# Patient Record
Sex: FEMALE | Race: WHITE | Employment: OTHER | URBAN - METROPOLITAN AREA
[De-identification: names, ages, dates, MRNs, and addresses within clinical notes are randomized per-mention and may not be internally consistent; named-entity substitution may affect disease eponyms.]

---

## 2018-03-16 ENCOUNTER — OFFICE VISIT (OUTPATIENT)
Dept: CARDIOLOGY CLINIC | Age: 73
End: 2018-03-16
Payer: MEDICARE

## 2018-03-16 VITALS
BODY MASS INDEX: 32.9 KG/M2 | HEIGHT: 60 IN | WEIGHT: 167.6 LBS | RESPIRATION RATE: 16 BRPM | DIASTOLIC BLOOD PRESSURE: 80 MMHG | SYSTOLIC BLOOD PRESSURE: 130 MMHG | HEART RATE: 81 BPM

## 2018-03-16 DIAGNOSIS — E78.00 PURE HYPERCHOLESTEROLEMIA: ICD-10-CM

## 2018-03-16 DIAGNOSIS — I25.10 CORONARY ARTERY DISEASE INVOLVING NATIVE CORONARY ARTERY OF NATIVE HEART WITHOUT ANGINA PECTORIS: Primary | Chronic | ICD-10-CM

## 2018-03-16 DIAGNOSIS — Z95.5 STENTED CORONARY ARTERY: ICD-10-CM

## 2018-03-16 PROCEDURE — 93000 ELECTROCARDIOGRAM COMPLETE: CPT | Performed by: INTERNAL MEDICINE

## 2018-03-16 PROCEDURE — G8400 PT W/DXA NO RESULTS DOC: HCPCS | Performed by: INTERNAL MEDICINE

## 2018-03-16 PROCEDURE — 4040F PNEUMOC VAC/ADMIN/RCVD: CPT | Performed by: INTERNAL MEDICINE

## 2018-03-16 PROCEDURE — G8482 FLU IMMUNIZE ORDER/ADMIN: HCPCS | Performed by: INTERNAL MEDICINE

## 2018-03-16 PROCEDURE — 3014F SCREEN MAMMO DOC REV: CPT | Performed by: INTERNAL MEDICINE

## 2018-03-16 PROCEDURE — G8417 CALC BMI ABV UP PARAM F/U: HCPCS | Performed by: INTERNAL MEDICINE

## 2018-03-16 PROCEDURE — G8598 ASA/ANTIPLAT THER USED: HCPCS | Performed by: INTERNAL MEDICINE

## 2018-03-16 PROCEDURE — 1090F PRES/ABSN URINE INCON ASSESS: CPT | Performed by: INTERNAL MEDICINE

## 2018-03-16 PROCEDURE — G8427 DOCREV CUR MEDS BY ELIG CLIN: HCPCS | Performed by: INTERNAL MEDICINE

## 2018-03-16 PROCEDURE — 3017F COLORECTAL CA SCREEN DOC REV: CPT | Performed by: INTERNAL MEDICINE

## 2018-03-16 PROCEDURE — 99213 OFFICE O/P EST LOW 20 MIN: CPT | Performed by: INTERNAL MEDICINE

## 2018-03-16 PROCEDURE — 1036F TOBACCO NON-USER: CPT | Performed by: INTERNAL MEDICINE

## 2018-03-16 PROCEDURE — 1123F ACP DISCUSS/DSCN MKR DOCD: CPT | Performed by: INTERNAL MEDICINE

## 2018-03-16 NOTE — PROGRESS NOTES
Patient Active Problem List   Diagnosis    Hydronephrosis    Dehydration    Gastroenteritis    CAD (coronary artery disease), native coronary artery    Essential hypertension, benign    Syncope, near    Pure hypercholesterolemia       Current Outpatient Prescriptions   Medication Sig Dispense Refill    NEXIUM 40 MG delayed release capsule       metFORMIN (GLUCOPHAGE) 500 MG tablet Take 500 mg by mouth Pt takes 1/2 tablet qd      metoprolol (TOPROL-XL) 25 MG XL tablet Take 12.5 mg by mouth 2 times daily. Indications: 12.5 mg bid      nitroGLYCERIN (NITROSTAT) 0.4 MG SL tablet Place 1 tablet under the tongue every 5 minutes as needed for Chest pain. 25 tablet 6    olmesartan (BENICAR) 20 MG tablet Take 10 mg by mouth 2 times daily. 10 mg twice a day      aspirin 81 MG chewable tablet Take 81 mg by mouth daily.  levothyroxine (SYNTHROID) 88 MCG tablet Take 88 mcg by mouth daily. Instructed to take with sip water am of procedure      rosuvastatin (CRESTOR) 10 MG tablet Take 10 mg by mouth daily        No current facility-administered medications for this visit. CC:    Patient is seen in follow up for:  1. Coronary artery disease involving native coronary artery of native heart without angina pectoris    2. Pure hypercholesterolemia    3. Stented coronary artery - Cx        HPI:  Patient is doing well without any specific cardiac problems. Patient denies any shortness of breath, chest pain, lightheadedness or dizziness. Patient is tolerating medications well without side effects. Notes lipids recently checked.     ROS:   General: No unusual weight gain, no change in exercise tolerance  Skin: No rash or itching  EENT: No vision changes or nosebleeds  Cardiovascular: No orthopnea or paroxysmal nocturnal dyspnea  Respiratory: No cough or hemoptysis  Gastrointestinal: No hematemesis or recent changes in bowel habits  Genitourinary: No hematuria, urgency or frequency  Musculoskeletal: No muscular

## 2018-11-08 ENCOUNTER — HOSPITAL ENCOUNTER (OUTPATIENT)
Dept: ULTRASOUND IMAGING | Age: 73
Discharge: HOME | End: 2018-11-10
Payer: MEDICARE

## 2018-11-08 DIAGNOSIS — R10.9 ABDOMINAL PAIN, UNSPECIFIED ABDOMINAL LOCATION: ICD-10-CM

## 2018-11-08 DIAGNOSIS — R10.11 RIGHT UPPER QUADRANT ABDOMINAL PAIN: ICD-10-CM

## 2018-11-08 PROCEDURE — 76705 ECHO EXAM OF ABDOMEN: CPT

## 2018-11-08 PROCEDURE — 76770 US EXAM ABDO BACK WALL COMP: CPT

## 2018-11-12 ENCOUNTER — HOSPITAL ENCOUNTER (OUTPATIENT)
Age: 73
Setting detail: OBSERVATION
Discharge: HOME | End: 2018-11-13
Attending: EMERGENCY MEDICINE | Admitting: INTERNAL MEDICINE
Payer: MEDICARE

## 2018-11-12 ENCOUNTER — APPOINTMENT (OUTPATIENT)
Dept: GENERAL RADIOLOGY | Age: 73
End: 2018-11-12
Payer: MEDICARE

## 2018-11-12 DIAGNOSIS — I10 HYPERTENSION, UNSPECIFIED TYPE: ICD-10-CM

## 2018-11-12 DIAGNOSIS — R07.9 CHEST PAIN, UNSPECIFIED TYPE: Primary | ICD-10-CM

## 2018-11-12 LAB
ANION GAP SERPL CALCULATED.3IONS-SCNC: 11 MMOL/L (ref 7–16)
BUN BLDV-MCNC: 15 MG/DL (ref 8–23)
CALCIUM SERPL-MCNC: 10.1 MG/DL (ref 8.6–10.2)
CHLORIDE BLD-SCNC: 105 MMOL/L (ref 98–107)
CO2: 24 MMOL/L (ref 22–29)
CREAT SERPL-MCNC: 0.7 MG/DL (ref 0.5–1)
EKG ATRIAL RATE: 80 BPM
EKG P AXIS: 61 DEGREES
EKG P-R INTERVAL: 174 MS
EKG Q-T INTERVAL: 382 MS
EKG QRS DURATION: 86 MS
EKG QTC CALCULATION (BAZETT): 440 MS
EKG R AXIS: 38 DEGREES
EKG T AXIS: 94 DEGREES
EKG VENTRICULAR RATE: 80 BPM
GFR AFRICAN AMERICAN: >60
GFR NON-AFRICAN AMERICAN: >60 ML/MIN/1.73
GLUCOSE BLD-MCNC: 108 MG/DL (ref 74–99)
HCT VFR BLD CALC: 42.2 % (ref 34–48)
HEMOGLOBIN: 13.9 G/DL (ref 11.5–15.5)
MCH RBC QN AUTO: 32 PG (ref 26–35)
MCHC RBC AUTO-ENTMCNC: 32.9 % (ref 32–34.5)
MCV RBC AUTO: 97 FL (ref 80–99.9)
METER GLUCOSE: 106 MG/DL (ref 74–99)
METER GLUCOSE: 96 MG/DL (ref 74–99)
PDW BLD-RTO: 13.4 FL (ref 11.5–15)
PLATELET # BLD: 147 E9/L (ref 130–450)
PMV BLD AUTO: 12.5 FL (ref 7–12)
POTASSIUM SERPL-SCNC: 4.8 MMOL/L (ref 3.5–5)
RBC # BLD: 4.35 E12/L (ref 3.5–5.5)
SODIUM BLD-SCNC: 140 MMOL/L (ref 132–146)
TROPONIN: <0.01 NG/ML (ref 0–0.03)
WBC # BLD: 4.8 E9/L (ref 4.5–11.5)

## 2018-11-12 PROCEDURE — G0378 HOSPITAL OBSERVATION PER HR: HCPCS

## 2018-11-12 PROCEDURE — 6370000000 HC RX 637 (ALT 250 FOR IP): Performed by: INTERNAL MEDICINE

## 2018-11-12 PROCEDURE — 36415 COLL VENOUS BLD VENIPUNCTURE: CPT

## 2018-11-12 PROCEDURE — 82553 CREATINE MB FRACTION: CPT

## 2018-11-12 PROCEDURE — 85027 COMPLETE CBC AUTOMATED: CPT

## 2018-11-12 PROCEDURE — 6370000000 HC RX 637 (ALT 250 FOR IP): Performed by: EMERGENCY MEDICINE

## 2018-11-12 PROCEDURE — 99285 EMERGENCY DEPT VISIT HI MDM: CPT

## 2018-11-12 PROCEDURE — 96374 THER/PROPH/DIAG INJ IV PUSH: CPT

## 2018-11-12 PROCEDURE — 84484 ASSAY OF TROPONIN QUANT: CPT

## 2018-11-12 PROCEDURE — 82962 GLUCOSE BLOOD TEST: CPT

## 2018-11-12 PROCEDURE — 80048 BASIC METABOLIC PNL TOTAL CA: CPT

## 2018-11-12 PROCEDURE — 93005 ELECTROCARDIOGRAM TRACING: CPT | Performed by: EMERGENCY MEDICINE

## 2018-11-12 PROCEDURE — APPSS45 APP SPLIT SHARED TIME 31-45 MINUTES: Performed by: NURSE PRACTITIONER

## 2018-11-12 PROCEDURE — 6360000002 HC RX W HCPCS: Performed by: INTERNAL MEDICINE

## 2018-11-12 PROCEDURE — 71045 X-RAY EXAM CHEST 1 VIEW: CPT

## 2018-11-12 PROCEDURE — 82550 ASSAY OF CK (CPK): CPT

## 2018-11-12 PROCEDURE — 99219 PR INITIAL OBSERVATION CARE/DAY 50 MINUTES: CPT | Performed by: INTERNAL MEDICINE

## 2018-11-12 RX ORDER — OMEPRAZOLE 40 MG/1
1 CAPSULE, DELAYED RELEASE ORAL EVERY MORNING
COMMUNITY
Start: 2018-09-23 | End: 2019-05-31 | Stop reason: SDUPTHER

## 2018-11-12 RX ORDER — ASCORBIC ACID 500 MG
1000 TABLET ORAL
Status: DISCONTINUED | OUTPATIENT
Start: 2018-11-12 | End: 2018-11-13 | Stop reason: HOSPADM

## 2018-11-12 RX ORDER — VITAMIN E 268 MG
400 CAPSULE ORAL EVERY MORNING
COMMUNITY

## 2018-11-12 RX ORDER — METOPROLOL SUCCINATE 25 MG/1
12.5 TABLET, EXTENDED RELEASE ORAL 2 TIMES DAILY
Status: DISCONTINUED | OUTPATIENT
Start: 2018-11-12 | End: 2018-11-13 | Stop reason: HOSPADM

## 2018-11-12 RX ORDER — ROSUVASTATIN CALCIUM 10 MG/1
10 TABLET, COATED ORAL NIGHTLY
Status: DISCONTINUED | OUTPATIENT
Start: 2018-11-12 | End: 2018-11-13 | Stop reason: HOSPADM

## 2018-11-12 RX ORDER — VITAMIN E 268 MG
400 CAPSULE ORAL EVERY MORNING
Status: DISCONTINUED | OUTPATIENT
Start: 2018-11-13 | End: 2018-11-13 | Stop reason: HOSPADM

## 2018-11-12 RX ORDER — LOSARTAN POTASSIUM 25 MG/1
12.5 TABLET ORAL DAILY
Status: DISCONTINUED | OUTPATIENT
Start: 2018-11-12 | End: 2018-11-13

## 2018-11-12 RX ORDER — PANTOPRAZOLE SODIUM 40 MG/1
40 TABLET, DELAYED RELEASE ORAL
Status: DISCONTINUED | OUTPATIENT
Start: 2018-11-13 | End: 2018-11-13 | Stop reason: HOSPADM

## 2018-11-12 RX ORDER — NITROGLYCERIN 0.4 MG/1
0.4 TABLET SUBLINGUAL EVERY 5 MIN PRN
Status: DISCONTINUED | OUTPATIENT
Start: 2018-11-12 | End: 2018-11-13 | Stop reason: HOSPADM

## 2018-11-12 RX ORDER — ASPIRIN 81 MG/1
81 TABLET, CHEWABLE ORAL EVERY MORNING
Status: DISCONTINUED | OUTPATIENT
Start: 2018-11-13 | End: 2018-11-13 | Stop reason: HOSPADM

## 2018-11-12 RX ORDER — ASPIRIN 81 MG/1
243 TABLET, CHEWABLE ORAL ONCE
Status: COMPLETED | OUTPATIENT
Start: 2018-11-12 | End: 2018-11-12

## 2018-11-12 RX ORDER — HYDRALAZINE HYDROCHLORIDE 20 MG/ML
10 INJECTION INTRAMUSCULAR; INTRAVENOUS
Status: DISCONTINUED | OUTPATIENT
Start: 2018-11-12 | End: 2018-11-13 | Stop reason: HOSPADM

## 2018-11-12 RX ORDER — ASCORBIC ACID 500 MG
1000 TABLET ORAL DAILY
COMMUNITY

## 2018-11-12 RX ORDER — LEVOTHYROXINE SODIUM 88 UG/1
88 TABLET ORAL EVERY MORNING
Status: DISCONTINUED | OUTPATIENT
Start: 2018-11-13 | End: 2018-11-13 | Stop reason: HOSPADM

## 2018-11-12 RX ADMIN — Medication 1000 MG: at 18:54

## 2018-11-12 RX ADMIN — HYDRALAZINE HYDROCHLORIDE 10 MG: 20 INJECTION INTRAMUSCULAR; INTRAVENOUS at 23:26

## 2018-11-12 RX ADMIN — ASPIRIN 81 MG 243 MG: 81 TABLET ORAL at 14:02

## 2018-11-12 RX ADMIN — METOPROLOL SUCCINATE 12.5 MG: 25 TABLET, EXTENDED RELEASE ORAL at 21:03

## 2018-11-12 RX ADMIN — LOSARTAN POTASSIUM 12.5 MG: 25 TABLET, FILM COATED ORAL at 18:54

## 2018-11-12 RX ADMIN — ROSUVASTATIN CALCIUM 10 MG: 10 TABLET, FILM COATED ORAL at 21:03

## 2018-11-12 ASSESSMENT — ENCOUNTER SYMPTOMS
VOMITING: 0
WHEEZING: 0
COUGH: 0
SINUS PRESSURE: 0
SORE THROAT: 0
EYE REDNESS: 0
DIARRHEA: 0
BACK PAIN: 0
COLOR CHANGE: 0
SHORTNESS OF BREATH: 0
NAUSEA: 0
RHINORRHEA: 0
ABDOMINAL PAIN: 0
TROUBLE SWALLOWING: 0
EYE PAIN: 0

## 2018-11-12 ASSESSMENT — PAIN SCALES - GENERAL
PAINLEVEL_OUTOF10: 6
PAINLEVEL_OUTOF10: 0

## 2018-11-12 NOTE — ED PROVIDER NOTES
Negative for cough, shortness of breath and wheezing. Cardiovascular: Positive for chest pain and near-syncope. Negative for palpitations, leg swelling and syncope. Gastrointestinal: Negative for abdominal pain, anorexia, diarrhea, nausea and vomiting. Genitourinary: Negative for dysuria, frequency and hematuria. Musculoskeletal: Negative for arthralgias, back pain and myalgias. Skin: Negative for color change and rash. Neurological: Positive for weakness. Negative for dizziness, syncope, light-headedness, numbness and headaches. Hematological: Negative for adenopathy. Physical Exam   Constitutional: She is oriented to person, place, and time. She appears well-developed and well-nourished. No distress. HENT:   Head: Normocephalic and atraumatic. Mouth/Throat: Oropharynx is clear and moist. No oropharyngeal exudate. Eyes: Pupils are equal, round, and reactive to light. Conjunctivae and EOM are normal. Right eye exhibits no discharge. Left eye exhibits no discharge. No scleral icterus. Neck: Normal range of motion. Neck supple. No JVD present. No tracheal deviation present. No thyromegaly present. Cardiovascular: Normal rate, regular rhythm, normal heart sounds and intact distal pulses. Exam reveals no gallop and no friction rub. No murmur heard. Pulmonary/Chest: Effort normal and breath sounds normal. No respiratory distress. She has no wheezes. She has no rales. She exhibits no tenderness. Abdominal: Soft. Bowel sounds are normal. She exhibits no distension. There is no tenderness. There is no rebound and no guarding. Musculoskeletal: Normal range of motion. She exhibits no edema, tenderness or deformity. Neurological: She is alert and oriented to person, place, and time. No cranial nerve deficit. She exhibits normal muscle tone. Coordination normal.   Skin: Skin is warm and dry. Capillary refill takes less than 2 seconds. No rash noted. She is not diaphoretic. No erythema. 98.2 °F (36.8 °C) Oral 89 18 96 % 5' (1.524 m) 166 lb 12.8 oz (75.7 kg)   11/12/18 1759 (!) 164/90 98.2 °F (36.8 °C) Oral 75 18 94 % - -   11/12/18 1742 (!) 155/79 98.2 °F (36.8 °C) Oral 83 16 97 % - -   11/12/18 1615 (!) 161/86 - - 71 16 99 % - -   11/12/18 1439 (!) 160/91 - - 74 16 100 % - -   11/12/18 1307 (!) 208/100 98.2 °F (36.8 °C) Oral 85 16 100 % 5' (1.524 m) 162 lb (73.5 kg)       Oxygen Saturation Interpretation: Normal    ------------------------------------------ PROGRESS NOTES ------------------------------------------  Re-evaluation(s):  Time: 1720  Patients symptoms show no change  Repeat physical examination is not changed    Counseling:  I have spoken with the patient and discussed todays results, in addition to providing specific details for the plan of care and counseling regarding the diagnosis and prognosis. Their questions are answered at this time and they are agreeable with the plan of admission.    --------------------------------- ADDITIONAL PROVIDER NOTES ---------------------------------  Consultations:  Time: 3608. Spoke with Dr. Ko Pena. Discussed case. They will admit the patient. This patient's ED course included: a personal history and physicial examination, re-evaluation prior to disposition, multiple bedside re-evaluations, cardiac monitoring and continuous pulse oximetry    This patient has remained hemodynamically stable during their ED course. Diagnosis:  1. Chest pain, unspecified type    2. Hypertension, unspecified type        Disposition:  Patient's disposition: Admit to telemetry  Patient's condition is stable.            Raymon Citizen, DO  Resident  11/12/18 5289

## 2018-11-13 ENCOUNTER — APPOINTMENT (OUTPATIENT)
Dept: ULTRASOUND IMAGING | Age: 73
End: 2018-11-13
Payer: MEDICARE

## 2018-11-13 VITALS
HEIGHT: 60 IN | WEIGHT: 166.8 LBS | OXYGEN SATURATION: 95 % | RESPIRATION RATE: 16 BRPM | HEART RATE: 78 BPM | BODY MASS INDEX: 32.75 KG/M2 | SYSTOLIC BLOOD PRESSURE: 145 MMHG | DIASTOLIC BLOOD PRESSURE: 68 MMHG | TEMPERATURE: 98.7 F

## 2018-11-13 LAB
CHOLESTEROL, TOTAL: 151 MG/DL (ref 0–199)
CK MB: 4.3 NG/ML (ref 0–4.3)
CK MB: 4.3 NG/ML (ref 0–4.3)
HBA1C MFR BLD: 6.3 % (ref 4–5.6)
HDLC SERPL-MCNC: 42 MG/DL
LDL CHOLESTEROL CALCULATED: 93 MG/DL (ref 0–99)
LV EF: 60 %
LVEF MODALITY: NORMAL
METER GLUCOSE: 134 MG/DL (ref 74–99)
METER GLUCOSE: 134 MG/DL (ref 74–99)
METER GLUCOSE: 96 MG/DL (ref 74–99)
TOTAL CK: 107 U/L (ref 20–180)
TOTAL CK: 116 U/L (ref 20–180)
TRIGL SERPL-MCNC: 80 MG/DL (ref 0–149)
TROPONIN: <0.01 NG/ML (ref 0–0.03)
TROPONIN: <0.01 NG/ML (ref 0–0.03)
TSH SERPL DL<=0.05 MIU/L-ACNC: 5.59 UIU/ML (ref 0.27–4.2)
VLDLC SERPL CALC-MCNC: 16 MG/DL

## 2018-11-13 PROCEDURE — 84484 ASSAY OF TROPONIN QUANT: CPT

## 2018-11-13 PROCEDURE — 6370000000 HC RX 637 (ALT 250 FOR IP): Performed by: INTERNAL MEDICINE

## 2018-11-13 PROCEDURE — 83036 HEMOGLOBIN GLYCOSYLATED A1C: CPT

## 2018-11-13 PROCEDURE — 76770 US EXAM ABDO BACK WALL COMP: CPT

## 2018-11-13 PROCEDURE — 82550 ASSAY OF CK (CPK): CPT

## 2018-11-13 PROCEDURE — 84443 ASSAY THYROID STIM HORMONE: CPT

## 2018-11-13 PROCEDURE — 36415 COLL VENOUS BLD VENIPUNCTURE: CPT

## 2018-11-13 PROCEDURE — 93975 VASCULAR STUDY: CPT

## 2018-11-13 PROCEDURE — APPSS60 APP SPLIT SHARED TIME 46-60 MINUTES: Performed by: NURSE PRACTITIONER

## 2018-11-13 PROCEDURE — 82962 GLUCOSE BLOOD TEST: CPT

## 2018-11-13 PROCEDURE — G0378 HOSPITAL OBSERVATION PER HR: HCPCS

## 2018-11-13 PROCEDURE — 80061 LIPID PANEL: CPT

## 2018-11-13 PROCEDURE — 93306 TTE W/DOPPLER COMPLETE: CPT

## 2018-11-13 PROCEDURE — 99214 OFFICE O/P EST MOD 30 MIN: CPT | Performed by: INTERNAL MEDICINE

## 2018-11-13 PROCEDURE — 82553 CREATINE MB FRACTION: CPT

## 2018-11-13 RX ORDER — LOSARTAN POTASSIUM 50 MG/1
50 TABLET ORAL DAILY
Status: DISCONTINUED | OUTPATIENT
Start: 2018-11-14 | End: 2018-11-13 | Stop reason: HOSPADM

## 2018-11-13 RX ORDER — DEXTROSE MONOHYDRATE 50 MG/ML
100 INJECTION, SOLUTION INTRAVENOUS PRN
Status: DISCONTINUED | OUTPATIENT
Start: 2018-11-13 | End: 2018-11-13 | Stop reason: HOSPADM

## 2018-11-13 RX ORDER — DEXTROSE MONOHYDRATE 25 G/50ML
12.5 INJECTION, SOLUTION INTRAVENOUS PRN
Status: DISCONTINUED | OUTPATIENT
Start: 2018-11-13 | End: 2018-11-13 | Stop reason: HOSPADM

## 2018-11-13 RX ORDER — NICOTINE POLACRILEX 4 MG
15 LOZENGE BUCCAL PRN
Status: DISCONTINUED | OUTPATIENT
Start: 2018-11-13 | End: 2018-11-13 | Stop reason: HOSPADM

## 2018-11-13 RX ORDER — AMLODIPINE BESYLATE 5 MG/1
5 TABLET ORAL DAILY
Qty: 30 TABLET | Refills: 3 | Status: SHIPPED | OUTPATIENT
Start: 2018-11-13 | End: 2018-12-06 | Stop reason: SDUPTHER

## 2018-11-13 RX ORDER — LEVOTHYROXINE SODIUM 0.1 MG/1
100 TABLET ORAL DAILY
Qty: 90 TABLET | Refills: 0 | Status: SHIPPED | OUTPATIENT
Start: 2018-11-13 | End: 2019-05-31

## 2018-11-13 RX ORDER — ACETAMINOPHEN 325 MG/1
650 TABLET ORAL EVERY 6 HOURS PRN
Status: DISCONTINUED | OUTPATIENT
Start: 2018-11-13 | End: 2018-11-13 | Stop reason: HOSPADM

## 2018-11-13 RX ORDER — AMLODIPINE BESYLATE 5 MG/1
5 TABLET ORAL ONCE
Status: COMPLETED | OUTPATIENT
Start: 2018-11-13 | End: 2018-11-13

## 2018-11-13 RX ADMIN — ACETAMINOPHEN 650 MG: 325 TABLET ORAL at 01:14

## 2018-11-13 RX ADMIN — ASPIRIN 81 MG CHEWABLE TABLET 81 MG: 81 TABLET CHEWABLE at 08:14

## 2018-11-13 RX ADMIN — LOSARTAN POTASSIUM 12.5 MG: 25 TABLET, FILM COATED ORAL at 08:13

## 2018-11-13 RX ADMIN — AMLODIPINE BESYLATE 5 MG: 5 TABLET ORAL at 00:12

## 2018-11-13 RX ADMIN — ACETAMINOPHEN 650 MG: 325 TABLET ORAL at 08:13

## 2018-11-13 RX ADMIN — LEVOTHYROXINE SODIUM 88 MCG: 88 TABLET ORAL at 08:13

## 2018-11-13 RX ADMIN — VITAMIN E CAP 400 UNIT 400 UNITS: 400 CAP at 08:13

## 2018-11-13 RX ADMIN — METOPROLOL SUCCINATE 12.5 MG: 25 TABLET, EXTENDED RELEASE ORAL at 10:18

## 2018-11-13 RX ADMIN — Medication 1000 MG: at 12:55

## 2018-11-13 ASSESSMENT — PAIN DESCRIPTION - FREQUENCY
FREQUENCY: CONTINUOUS
FREQUENCY: CONTINUOUS

## 2018-11-13 ASSESSMENT — PAIN SCALES - GENERAL
PAINLEVEL_OUTOF10: 0
PAINLEVEL_OUTOF10: 8
PAINLEVEL_OUTOF10: 2
PAINLEVEL_OUTOF10: 0
PAINLEVEL_OUTOF10: 4

## 2018-11-13 ASSESSMENT — PAIN DESCRIPTION - DESCRIPTORS
DESCRIPTORS: HEADACHE
DESCRIPTORS: ACHING;CONSTANT;DISCOMFORT;HEADACHE

## 2018-11-13 ASSESSMENT — PAIN DESCRIPTION - PAIN TYPE
TYPE: ACUTE PAIN
TYPE: ACUTE PAIN

## 2018-11-13 ASSESSMENT — PAIN DESCRIPTION - PROGRESSION
CLINICAL_PROGRESSION: GRADUALLY WORSENING
CLINICAL_PROGRESSION: NOT CHANGED

## 2018-11-13 ASSESSMENT — PAIN DESCRIPTION - LOCATION
LOCATION: HEAD
LOCATION: HEAD

## 2018-11-13 ASSESSMENT — PAIN DESCRIPTION - ORIENTATION
ORIENTATION: RIGHT;LEFT
ORIENTATION: RIGHT;LEFT

## 2018-11-13 ASSESSMENT — PAIN DESCRIPTION - ONSET: ONSET: ON-GOING

## 2018-11-13 NOTE — PROGRESS NOTES
[facility] Quality Flow/Interdisciplinary Rounds Progress Note        Quality Flow Rounds held on November 13, 2018    Disciplines Attending:  Bedside Nurse, ,  and Nursing Unit Leadership    Robb Covarrubias was admitted on 11/12/2018  1:09 PM    Anticipated Discharge Date:  Expected Discharge Date: 11/15/18    Disposition:    Del Score:  Del Scale Score: 21    Readmission Risk              Risk of Unplanned Readmission:        8           Discussed patient goal for the day, patient clinical progression, and barriers to discharge. The following Goal(s) of the Day/Commitment(s) have been identified:  Bp control and check cardio plan.       Bullock Marker  November 13, 2018

## 2018-11-13 NOTE — PLAN OF CARE
Problem: Pain:  Goal: Pain level will decrease  Pain level will decrease      Outcome: Met This Shift    Goal: Control of acute pain  Control of acute pain   Outcome: Met This Shift    Goal: Control of chronic pain  Control of chronic pain   Outcome: Met This Shift      Problem: Falls - Risk of:  Goal: Absence of falls  Outcome: Met This Shift      Problem: Cardiac Output - Decreased:  Goal: Cardiac output within specified parameters  Cardiac output within specified parameters     Outcome: Met This Shift

## 2018-11-13 NOTE — CONSULTS
tablet 650 mg, 650 mg, Oral, Q6H PRN  aspirin chewable tablet 81 mg, 81 mg, Oral, QAM  levothyroxine (SYNTHROID) tablet 88 mcg, 88 mcg, Oral, QAM  metoprolol succinate (TOPROL XL) extended release tablet 12.5 mg, 12.5 mg, Oral, BID  nitroGLYCERIN (NITROSTAT) SL tablet 0.4 mg, 0.4 mg, Sublingual, Q5 Min PRN  losartan (COZAAR) tablet 12.5 mg, 12.5 mg, Oral, Daily  pantoprazole (PROTONIX) tablet 40 mg, 40 mg, Oral, QAM AC  rosuvastatin (CRESTOR) tablet 10 mg, 10 mg, Oral, Nightly  vitamin C (ASCORBIC ACID) tablet 1,000 mg, 1,000 mg, Oral, Lunch  vitamin E capsule 400 Units, 400 Units, Oral, QAM  insulin lispro (HUMALOG) injection vial 0-6 Units, 0-6 Units, Subcutaneous, TID WC  insulin lispro (HUMALOG) injection vial 0-3 Units, 0-3 Units, Subcutaneous, Nightly  hydrALAZINE (APRESOLINE) injection 10 mg, 10 mg, Intravenous, Q3H PRN    Allergies:  Epinephrine and Other    Social History: Activity: Patient currently lives at home with her , she states that she is exercising, walking her dog up a hill, once per week, doing so once per week with no more dyspnea than at baseline. Tobacco: Half pack per day for 15 years quitting in 1974  Alcohol: Occasional use  Illicit: Denies use      Family History: Noncontributory due to patient's advanced age        REVIEW OF SYSTEMS:     · Constitutional: Denies fevers, chills, night sweats, and + for fatigue  · HEENT: Denies headaches, nose bleeds, and blurred vision,oral pain, abscess or lesion. · Musculoskeletal: Denies falls, pain to BLE with ambulation and edema to BLE. · Neurological: Denies , numbness and tingling + for dizziness and lightheadedness  · Cardiovascular: Denies chest pain, palpitations, and feelings of heart racing. · Respiratory: Denies orthopnea and PND  · Gastrointestinal: Denies heartburn, nausea/vomiting, diarrhea and constipation, black/bloody, and tarry stools.    · Genitourinary: Denies dysuria and hematuria  · Hematologic: Denies excessive

## 2018-11-13 NOTE — H&P
gallops, intact distal pulses and no carotid bruits  Abdomen: soft, non-tender, non-distended, normal bowel sounds, no masses or organomegaly      Recent Labs      11/12/18   1356   NA  140   K  4.8   CL  105   CO2  24   BUN  15   CREATININE  0.7   GLUCOSE  108*   CALCIUM  10.1       Recent Labs      11/12/18   1356   WBC  4.8   RBC  4.35   HGB  13.9   HCT  42.2   MCV  97.0   MCH  32.0   MCHC  32.9   RDW  13.4   PLT  147   MPV  12.5*     TSH 5.59  [id number]-7.3      Radiology:   XR CHEST PORTABLE   Final Result   No airspace opacities or pleural effusion. US RETROPERITONEAL SURESH    (Results Pending)       Assessment:    Active Problems:    Chest pain  Resolved Problems:    * No resolved hospital problems. *      Plan:  1. Spoke to patient has been taking her synthroid regularly for last 1-2 months  2.  Will need to go on 100 mcg of synthroid, follows with endocrine  3. DC once ok from cardiology side         Electronically signed by Fox Harper MD on 11/13/2018 at 1:35 PM

## 2018-11-13 NOTE — PROGRESS NOTES
Spoke with Dr. Sunny Chapman regarding patients blood pressure being elevated despite prn hydralazine being given 30 minutes ago and patient symptomatic with lightheadedness, dizziness, feeling hot and heart racing. Orders received.

## 2018-11-16 ENCOUNTER — TELEPHONE (OUTPATIENT)
Dept: CARDIOLOGY CLINIC | Age: 73
End: 2018-11-16

## 2018-12-06 RX ORDER — AMLODIPINE BESYLATE 5 MG/1
5 TABLET ORAL DAILY
Qty: 90 TABLET | Refills: 3 | Status: SHIPPED | OUTPATIENT
Start: 2018-12-06 | End: 2019-05-31 | Stop reason: SDUPTHER

## 2018-12-21 ENCOUNTER — OFFICE VISIT (OUTPATIENT)
Dept: CARDIOLOGY CLINIC | Age: 73
End: 2018-12-21
Payer: MEDICARE

## 2018-12-21 VITALS
BODY MASS INDEX: 33.2 KG/M2 | DIASTOLIC BLOOD PRESSURE: 60 MMHG | WEIGHT: 164.7 LBS | RESPIRATION RATE: 16 BRPM | SYSTOLIC BLOOD PRESSURE: 122 MMHG | HEIGHT: 59 IN | HEART RATE: 80 BPM

## 2018-12-21 DIAGNOSIS — I10 ESSENTIAL HYPERTENSION, BENIGN: Chronic | ICD-10-CM

## 2018-12-21 DIAGNOSIS — I49.3 PVC (PREMATURE VENTRICULAR CONTRACTION): ICD-10-CM

## 2018-12-21 DIAGNOSIS — R07.89 OTHER CHEST PAIN: ICD-10-CM

## 2018-12-21 DIAGNOSIS — I25.119 CORONARY ARTERY DISEASE INVOLVING NATIVE CORONARY ARTERY WITH ANGINA PECTORIS, UNSPECIFIED WHETHER NATIVE OR TRANSPLANTED HEART (HCC): Primary | Chronic | ICD-10-CM

## 2018-12-21 DIAGNOSIS — E78.00 PURE HYPERCHOLESTEROLEMIA: ICD-10-CM

## 2018-12-21 PROCEDURE — 1123F ACP DISCUSS/DSCN MKR DOCD: CPT | Performed by: PHYSICIAN ASSISTANT

## 2018-12-21 PROCEDURE — 93000 ELECTROCARDIOGRAM COMPLETE: CPT | Performed by: INTERNAL MEDICINE

## 2018-12-21 PROCEDURE — G8427 DOCREV CUR MEDS BY ELIG CLIN: HCPCS | Performed by: PHYSICIAN ASSISTANT

## 2018-12-21 PROCEDURE — G8598 ASA/ANTIPLAT THER USED: HCPCS | Performed by: PHYSICIAN ASSISTANT

## 2018-12-21 PROCEDURE — 1090F PRES/ABSN URINE INCON ASSESS: CPT | Performed by: PHYSICIAN ASSISTANT

## 2018-12-21 PROCEDURE — 4040F PNEUMOC VAC/ADMIN/RCVD: CPT | Performed by: PHYSICIAN ASSISTANT

## 2018-12-21 PROCEDURE — G8484 FLU IMMUNIZE NO ADMIN: HCPCS | Performed by: PHYSICIAN ASSISTANT

## 2018-12-21 PROCEDURE — 1101F PT FALLS ASSESS-DOCD LE1/YR: CPT | Performed by: PHYSICIAN ASSISTANT

## 2018-12-21 PROCEDURE — 99213 OFFICE O/P EST LOW 20 MIN: CPT | Performed by: PHYSICIAN ASSISTANT

## 2018-12-21 PROCEDURE — G8400 PT W/DXA NO RESULTS DOC: HCPCS | Performed by: PHYSICIAN ASSISTANT

## 2018-12-21 PROCEDURE — 3017F COLORECTAL CA SCREEN DOC REV: CPT | Performed by: PHYSICIAN ASSISTANT

## 2018-12-21 PROCEDURE — G8417 CALC BMI ABV UP PARAM F/U: HCPCS | Performed by: PHYSICIAN ASSISTANT

## 2018-12-21 PROCEDURE — 1036F TOBACCO NON-USER: CPT | Performed by: PHYSICIAN ASSISTANT

## 2018-12-21 RX ORDER — NITROGLYCERIN 0.4 MG/1
0.4 TABLET SUBLINGUAL EVERY 5 MIN PRN
Qty: 75 TABLET | Refills: 3 | Status: SHIPPED | OUTPATIENT
Start: 2018-12-21 | End: 2019-12-30

## 2018-12-21 NOTE — PATIENT INSTRUCTIONS
Trial taking Norvasc (amlodipine) at nighttime (try 5 mg first; if it makes you too fatigued or dizzy you can go to 2.5 mg dosing)      Patient Education        DASH Diet: Care Instructions  Your Care Instructions    The DASH diet is an eating plan that can help lower your blood pressure. DASH stands for Dietary Approaches to Stop Hypertension. Hypertension is high blood pressure. The DASH diet focuses on eating foods that are high in calcium, potassium, and magnesium. These nutrients can lower blood pressure. The foods that are highest in these nutrients are fruits, vegetables, low-fat dairy products, nuts, seeds, and legumes. But taking calcium, potassium, and magnesium supplements instead of eating foods that are high in those nutrients does not have the same effect. The DASH diet also includes whole grains, fish, and poultry. The DASH diet is one of several lifestyle changes your doctor may recommend to lower your high blood pressure. Your doctor may also want you to decrease the amount of sodium in your diet. Lowering sodium while following the DASH diet can lower blood pressure even further than just the DASH diet alone. Follow-up care is a key part of your treatment and safety. Be sure to make and go to all appointments, and call your doctor if you are having problems. It's also a good idea to know your test results and keep a list of the medicines you take. How can you care for yourself at home? Following the DASH diet  · Eat 4 to 5 servings of fruit each day. A serving is 1 medium-sized piece of fruit, ½ cup chopped or canned fruit, 1/4 cup dried fruit, or 4 ounces (½ cup) of fruit juice. Choose fruit more often than fruit juice. · Eat 4 to 5 servings of vegetables each day. A serving is 1 cup of lettuce or raw leafy vegetables, ½ cup of chopped or cooked vegetables, or 4 ounces (½ cup) of vegetable juice. Choose vegetables more often than vegetable juice.   · Get 2 to 3 servings of low-fat and

## 2018-12-21 NOTE — PROGRESS NOTES
[address] [place] [address] Cardiology        Dear Dr. Rangel Shetty MD,    We had the pleasure of seeing Jeff Evans in the [facility] office. Her primary cardiologist is Dr. Grace Rodriguez. Chief Complaint: HFU for CAD, chest pain, HTN, PVCs  Chief Complaint   Patient presents with    Coronary Artery Disease    Follow-Up from Hospital    Hypertension    Chest Pain       Date of Service: 12/21/2018    HISTORY OF PRESENT ILLNESS:    Jeff Evans is a pleasant 75 y.o. female with a past medical history below. Patient had hospitalization 11/12/18 for atypical chest pain and markedly elevated BP. EKG and troponins ruled out negative. Echo 11/13/18 noted EF 60%, no WMA, trace MR/TR. She underwent renal artery U/S which was unremarkable. Norvasc was added to her medication regimen. Since discharge, she has been compliant on her medications. She does admit that when taking all of her am BP medications, she feels very fatigued and mildly dizzy. she has been able to ambulate and do ADLs without any recurrence of chest pain. She denies any significant IRAHETA, palpitations, or near syncope. BP is controlled today (122/60).        ALLERGIES:    Allergies   Allergen Reactions    Epinephrine      \"makes me spacey\"    Other Nausea Only     unknown antibiotic       PROBLEM LIST:   Patient Active Problem List    Diagnosis Date Noted    Chest pain 11/12/2018    Pure hypercholesterolemia 11/21/2016    Syncope, near 11/16/2015    Hydronephrosis 03/26/2014    Gastroenteritis 03/26/2014    CAD (coronary artery disease), native coronary artery 03/26/2014    Essential hypertension, benign 03/26/2014    Stented coronary artery - Cx 12/20/2013       PAST MEDICAL HISTORY:       Diagnosis Date    CAD (coronary artery disease)     follows with Dr. Grace Rodriguez yearly (next appt3/22/18)    Diabetes mellitus ([facility] [address]     History of colon polyps     Hyperinsulinism     Hyperlipidemia     Hypertension     Thyroid disease

## 2019-02-11 ENCOUNTER — OFFICE VISIT (OUTPATIENT)
Dept: CARDIOLOGY CLINIC | Age: 74
End: 2019-02-11
Payer: MEDICARE

## 2019-02-11 VITALS
RESPIRATION RATE: 14 BRPM | WEIGHT: 166 LBS | SYSTOLIC BLOOD PRESSURE: 158 MMHG | DIASTOLIC BLOOD PRESSURE: 90 MMHG | BODY MASS INDEX: 33.47 KG/M2 | HEART RATE: 82 BPM | HEIGHT: 59 IN

## 2019-02-11 DIAGNOSIS — Z95.5 STENTED CORONARY ARTERY: Primary | ICD-10-CM

## 2019-02-11 DIAGNOSIS — I10 ESSENTIAL HYPERTENSION, BENIGN: Chronic | ICD-10-CM

## 2019-02-11 DIAGNOSIS — E78.00 PURE HYPERCHOLESTEROLEMIA: ICD-10-CM

## 2019-02-11 PROCEDURE — 4040F PNEUMOC VAC/ADMIN/RCVD: CPT | Performed by: INTERNAL MEDICINE

## 2019-02-11 PROCEDURE — 1090F PRES/ABSN URINE INCON ASSESS: CPT | Performed by: INTERNAL MEDICINE

## 2019-02-11 PROCEDURE — 1101F PT FALLS ASSESS-DOCD LE1/YR: CPT | Performed by: INTERNAL MEDICINE

## 2019-02-11 PROCEDURE — G8598 ASA/ANTIPLAT THER USED: HCPCS | Performed by: INTERNAL MEDICINE

## 2019-02-11 PROCEDURE — 1036F TOBACCO NON-USER: CPT | Performed by: INTERNAL MEDICINE

## 2019-02-11 PROCEDURE — 1123F ACP DISCUSS/DSCN MKR DOCD: CPT | Performed by: INTERNAL MEDICINE

## 2019-02-11 PROCEDURE — G8400 PT W/DXA NO RESULTS DOC: HCPCS | Performed by: INTERNAL MEDICINE

## 2019-02-11 PROCEDURE — G8427 DOCREV CUR MEDS BY ELIG CLIN: HCPCS | Performed by: INTERNAL MEDICINE

## 2019-02-11 PROCEDURE — G8417 CALC BMI ABV UP PARAM F/U: HCPCS | Performed by: INTERNAL MEDICINE

## 2019-02-11 PROCEDURE — 99214 OFFICE O/P EST MOD 30 MIN: CPT | Performed by: INTERNAL MEDICINE

## 2019-02-11 PROCEDURE — G8484 FLU IMMUNIZE NO ADMIN: HCPCS | Performed by: INTERNAL MEDICINE

## 2019-02-11 PROCEDURE — 3017F COLORECTAL CA SCREEN DOC REV: CPT | Performed by: INTERNAL MEDICINE

## 2019-02-11 PROCEDURE — 93000 ELECTROCARDIOGRAM COMPLETE: CPT | Performed by: INTERNAL MEDICINE

## 2019-02-11 RX ORDER — BLOOD-GLUCOSE METER
KIT MISCELLANEOUS
COMMUNITY
Start: 2018-12-22 | End: 2020-09-01 | Stop reason: CLARIF

## 2019-02-15 RX ORDER — ROSUVASTATIN CALCIUM 10 MG/1
10 TABLET, COATED ORAL NIGHTLY
Qty: 90 TABLET | Refills: 3 | Status: SHIPPED | OUTPATIENT
Start: 2019-02-15 | End: 2019-05-31 | Stop reason: SDUPTHER

## 2019-02-28 ENCOUNTER — TELEPHONE (OUTPATIENT)
Dept: CARDIOLOGY CLINIC | Age: 74
End: 2019-02-28

## 2019-03-01 RX ORDER — ROSUVASTATIN CALCIUM 10 MG/1
10 TABLET, FILM COATED ORAL DAILY
Qty: 90 TABLET | Refills: 3 | Status: SHIPPED | OUTPATIENT
Start: 2019-03-01 | End: 2019-05-31

## 2019-03-07 ENCOUNTER — TELEPHONE (OUTPATIENT)
Dept: CARDIOLOGY CLINIC | Age: 74
End: 2019-03-07

## 2019-05-13 ENCOUNTER — TELEPHONE (OUTPATIENT)
Dept: FAMILY MEDICINE CLINIC | Age: 74
End: 2019-05-13

## 2019-05-31 ENCOUNTER — OFFICE VISIT (OUTPATIENT)
Dept: FAMILY MEDICINE CLINIC | Age: 74
End: 2019-05-31
Payer: MEDICARE

## 2019-05-31 VITALS
SYSTOLIC BLOOD PRESSURE: 134 MMHG | BODY MASS INDEX: 33.47 KG/M2 | OXYGEN SATURATION: 93 % | WEIGHT: 166 LBS | DIASTOLIC BLOOD PRESSURE: 86 MMHG | HEIGHT: 59 IN | HEART RATE: 91 BPM

## 2019-05-31 DIAGNOSIS — E03.8 HYPOTHYROIDISM DUE TO HASHIMOTO'S THYROIDITIS: ICD-10-CM

## 2019-05-31 DIAGNOSIS — I10 ESSENTIAL HYPERTENSION, BENIGN: Chronic | ICD-10-CM

## 2019-05-31 DIAGNOSIS — K21.9 GASTROESOPHAGEAL REFLUX DISEASE WITHOUT ESOPHAGITIS: ICD-10-CM

## 2019-05-31 DIAGNOSIS — E06.3 HYPOTHYROIDISM DUE TO HASHIMOTO'S THYROIDITIS: ICD-10-CM

## 2019-05-31 DIAGNOSIS — I25.10 CORONARY ARTERY DISEASE INVOLVING NATIVE CORONARY ARTERY WITHOUT ANGINA PECTORIS, UNSPECIFIED WHETHER NATIVE OR TRANSPLANTED HEART: Primary | Chronic | ICD-10-CM

## 2019-05-31 DIAGNOSIS — Z12.39 SCREENING FOR BREAST CANCER: ICD-10-CM

## 2019-05-31 DIAGNOSIS — E11.9 TYPE 2 DIABETES MELLITUS WITHOUT COMPLICATION, WITHOUT LONG-TERM CURRENT USE OF INSULIN (HCC): ICD-10-CM

## 2019-05-31 DIAGNOSIS — E78.49 OTHER HYPERLIPIDEMIA: ICD-10-CM

## 2019-05-31 LAB — HBA1C MFR BLD: 6.2 %

## 2019-05-31 PROCEDURE — 99214 OFFICE O/P EST MOD 30 MIN: CPT | Performed by: FAMILY MEDICINE

## 2019-05-31 PROCEDURE — 83036 HEMOGLOBIN GLYCOSYLATED A1C: CPT | Performed by: FAMILY MEDICINE

## 2019-05-31 RX ORDER — LEVOTHYROXINE SODIUM 75 MCG
75 TABLET ORAL DAILY
Qty: 90 TABLET | Refills: 3 | Status: SHIPPED | OUTPATIENT
Start: 2019-05-31 | End: 2019-11-21 | Stop reason: SDUPTHER

## 2019-05-31 RX ORDER — OLMESARTAN MEDOXOMIL 20 MG/1
20 TABLET ORAL DAILY
Qty: 90 TABLET | Refills: 3 | Status: SHIPPED | OUTPATIENT
Start: 2019-05-31 | End: 2019-11-21 | Stop reason: SDUPTHER

## 2019-05-31 RX ORDER — METOPROLOL SUCCINATE 25 MG/1
12.5 TABLET, EXTENDED RELEASE ORAL 2 TIMES DAILY
Qty: 90 TABLET | Refills: 3 | Status: SHIPPED | OUTPATIENT
Start: 2019-05-31 | End: 2019-11-21 | Stop reason: SDUPTHER

## 2019-05-31 RX ORDER — OMEPRAZOLE 40 MG/1
40 CAPSULE, DELAYED RELEASE ORAL EVERY MORNING
Qty: 90 CAPSULE | Refills: 3 | Status: SHIPPED | OUTPATIENT
Start: 2019-05-31 | End: 2019-11-21 | Stop reason: SDUPTHER

## 2019-05-31 RX ORDER — AMLODIPINE BESYLATE 5 MG/1
2.5 TABLET ORAL DAILY
Qty: 45 TABLET | Refills: 3 | Status: SHIPPED | OUTPATIENT
Start: 2019-05-31 | End: 2019-11-21 | Stop reason: SDUPTHER

## 2019-05-31 RX ORDER — ROSUVASTATIN CALCIUM 10 MG/1
10 TABLET, FILM COATED ORAL DAILY
Qty: 90 TABLET | Refills: 3 | Status: CANCELLED | OUTPATIENT
Start: 2019-05-31

## 2019-05-31 RX ORDER — ROSUVASTATIN CALCIUM 10 MG/1
10 TABLET, COATED ORAL NIGHTLY
Qty: 90 TABLET | Refills: 3 | Status: SHIPPED | OUTPATIENT
Start: 2019-05-31 | End: 2019-11-21 | Stop reason: SDUPTHER

## 2019-05-31 RX ORDER — LEVOTHYROXINE SODIUM 75 MCG
1 TABLET ORAL DAILY
COMMUNITY
Start: 2019-04-18 | End: 2019-05-31 | Stop reason: SDUPTHER

## 2019-05-31 ASSESSMENT — ENCOUNTER SYMPTOMS
VOMITING: 0
BLOOD IN STOOL: 0
EYES NEGATIVE: 1
CONSTIPATION: 0
CHEST TIGHTNESS: 0
COUGH: 0
WHEEZING: 0
ABDOMINAL PAIN: 0
DIARRHEA: 0

## 2019-05-31 ASSESSMENT — PATIENT HEALTH QUESTIONNAIRE - PHQ9
SUM OF ALL RESPONSES TO PHQ9 QUESTIONS 1 & 2: 0
SUM OF ALL RESPONSES TO PHQ QUESTIONS 1-9: 0
SUM OF ALL RESPONSES TO PHQ QUESTIONS 1-9: 0
2. FEELING DOWN, DEPRESSED OR HOPELESS: 0
1. LITTLE INTEREST OR PLEASURE IN DOING THINGS: 0

## 2019-05-31 NOTE — PROGRESS NOTES
OFFICE NOTE    19  Name: Rachael Bravo  :1945   Sex:female   Age:73 y.o. SUBJECTIVE  Chief Complaint   Patient presents with    Hypertension    Hyperlipidemia    Diabetes       HPI   Regular checkup and refills. Had hosptililization in March for elevated BP and chest pain. Did u/s of renal arteries and added amlodipine which seems to be working. BS have been a bit high, sees Dr. Torres Ellis  This summer. Review of Systems   Constitutional: Negative for appetite change, fever and unexpected weight change. Has lost 11 lbs on metformin   HENT: Negative for congestion, ear pain and postnasal drip. Eyes: Negative. Negative for visual disturbance. Respiratory: Negative for cough, chest tightness and wheezing. No chest pain since March admission   Cardiovascular: Negative for chest pain and palpitations. Gastrointestinal: Negative for abdominal pain, blood in stool, constipation, diarrhea and vomiting. Endocrine: Negative for cold intolerance, polydipsia and polyuria. Genitourinary: Negative for dysuria and hematuria. Musculoskeletal: Negative for arthralgias, gait problem and joint swelling. Left tennis elbow   Skin: Negative for rash and wound. Allergic/Immunologic: Negative for environmental allergies and food allergies. Neurological: Positive for dizziness. Negative for tremors, weakness and headaches. Resolved since reduced amlodipine and started taking in evening   Hematological: Negative for adenopathy. Does not bruise/bleed easily. Psychiatric/Behavioral: Negative for behavioral problems, confusion, dysphoric mood and sleep disturbance.             Current Outpatient Medications:     rosuvastatin (CRESTOR) 10 MG tablet, Take 1 tablet by mouth nightly, Disp: 90 tablet, Rfl: 3    amLODIPine (NORVASC) 5 MG tablet, Take 0.5 tablets by mouth daily, Disp: 45 tablet, Rfl: 3    SYNTHROID 75 MCG tablet, Take 1 tablet by mouth daily, Disp: 90 tablet, Rfl: 3    omeprazole (PRILOSEC) 40 MG delayed release capsule, Take 1 capsule by mouth every morning, Disp: 90 capsule, Rfl: 3    olmesartan (BENICAR) 20 MG tablet, Take 1 tablet by mouth daily, Disp: 90 tablet, Rfl: 3    metoprolol succinate (TOPROL XL) 25 MG extended release tablet, Take 0.5 tablets by mouth 2 times daily 12.5mg BID, Disp: 90 tablet, Rfl: 3    metFORMIN (GLUCOPHAGE) 500 MG tablet, Take 1 tablet by mouth Daily with supper, Disp: 90 tablet, Rfl: 3    FREESTYLE LITE strip, , Disp: , Rfl:     nitroGLYCERIN (NITROSTAT) 0.4 MG SL tablet, Place 1 tablet under the tongue every 5 minutes as needed for Chest pain, Disp: 75 tablet, Rfl: 3    vitamin E 400 UNIT capsule, Take 400 Units by mouth every morning, Disp: , Rfl:     vitamin C (ASCORBIC ACID) 500 MG tablet, Take 1,000 mg by mouth Daily with lunch, Disp: , Rfl:     aspirin 81 MG chewable tablet, Take 81 mg by mouth every morning , Disp: , Rfl:   Allergies   Allergen Reactions    Epinephrine      \"makes me spacey\"    Other Nausea Only     unknown antibiotic       Past Medical History:   Diagnosis Date    CAD (coronary artery disease)     follows with Dr. Tyron Denton yearly (next appt3/22/18)    [facility] mellitus ([facility] [address]     History of colon polyps     Hyperinsulinism     Hyperlipidemia     Hypertension     Thyroid disease      Past Surgical History:   Procedure Laterality Date    APPENDECTOMY      CARDIAC CATHETERIZATION  5/2011    CARDIAC CATHETERIZATION  1-    Dr. Mike Rodriguez  4/11    stent placement    CARPAL TUNNEL RELEASE Bilateral     COLONOSCOPY      COLONOSCOPY  07/02/2012    CORONARY ANGIOPLASTY      DIAGNOSTIC CARDIAC CATH LAB PROCEDURE      ENDOSCOPY, COLON, DIAGNOSTIC      EYE SURGERY Right     cataract    HYSTERECTOMY       Family History   Problem Relation Age of Onset    Heart Attack Mother     Heart Attack Father     Heart Surgery Brother     Heart Attack Sister      Social History     Tobacco History     Smoking Status  Former Smoker Smoking Start Date  11/12/1959 Quit date  11/12/1974 Smoking Frequency  0.5 packs/day for 15 years (7.5 pk yrs)    Smoking Tobacco Type  Cigarettes    Smokeless Tobacco Use  Never Used          Alcohol History     Alcohol Use Status  No          Drug Use     Drug Use Status  No          Sexual Activity     Sexually Active  Not Currently Partners  Male                OBJECTIVE  Vitals:    05/31/19 0855   BP: 134/86   Pulse: 91   SpO2: 93%   Weight: 166 lb (75.3 kg)   Height: 4' 11\" (1.499 m)       Body mass index is 33.53 kg/m². Orders Placed This Encounter   Procedures    RD DIGITAL SCREEN W CAD BILATERAL     Standing Status:   Future     Standing Expiration Date:   7/31/2020    POCT glycosylated hemoglobin (Hb A1C)          Patient is obese    EXAM   Physical Exam   Constitutional: She is oriented to person, place, and time. She appears well-developed. HENT:   Right Ear: Tympanic membrane, external ear and ear canal normal.   Left Ear: Tympanic membrane, external ear and ear canal normal.   Nose: Nose normal.   Mouth/Throat: Oropharynx is clear and moist.   Eyes: Conjunctivae are normal.   Neck: Trachea normal. Neck supple. No JVD present. No thyroid mass and no thyromegaly present. Cardiovascular: Normal rate, regular rhythm, normal heart sounds and intact distal pulses. Exam reveals no gallop. No murmur heard. Pulmonary/Chest: Effort normal and breath sounds normal. She has no wheezes. She has no rales. Abdominal: Soft. Bowel sounds are normal. She exhibits no distension and no mass. There is no tenderness. There is no guarding. Musculoskeletal: Normal range of motion. Lymphadenopathy:     She has no cervical adenopathy. Neurological: She is alert and oriented to person, place, and time. She exhibits normal muscle tone. Skin: Skin is warm and dry. No rash noted. Psychiatric: She has a normal mood and affect.  Her behavior is normal. ASSESSMENT/PLAN:  1. Coronary artery disease involving native coronary artery without angina pectoris, unspecified whether native or transplanted heart  Was put on amlodipine in March. Reduced to 2.5 mg and taking at hs now which is helping her    2. Essential hypertension, benign  See above, meds refilled  - amLODIPine (NORVASC) 5 MG tablet; Take 0.5 tablets by mouth daily  Dispense: 45 tablet; Refill: 3  - olmesartan (BENICAR) 20 MG tablet; Take 1 tablet by mouth daily  Dispense: 90 tablet; Refill: 3  - metoprolol succinate (TOPROL XL) 25 MG extended release tablet; Take 0.5 tablets by mouth 2 times daily 12.5mg BID  Dispense: 90 tablet; Refill: 3    3. Other hyperlipidemia  Tolerating this, no longer taking brand Crestor  - rosuvastatin (CRESTOR) 10 MG tablet; Take 1 tablet by mouth nightly  Dispense: 90 tablet; Refill: 3    4. Type 2 diabetes mellitus without complication, without long-term current use of insulin ([facility])  A1C ordered. Sugars have been \"up and down\"  - metFORMIN (GLUCOPHAGE) 500 MG tablet; Take 1 tablet by mouth Daily with supper  Dispense: 90 tablet; Refill: 3  - POCT glycosylated hemoglobin (Hb A1C)    5. Hypothyroidism due to Hashimoto's thyroiditis  Labs current, no changes made  - SYNTHROID 75 MCG tablet; Take 1 tablet by mouth daily  Dispense: 90 tablet; Refill: 3    6. Gastroesophageal reflux disease without esophagitis  Does well on PPI, no changes made  - omeprazole (PRILOSEC) 40 MG delayed release capsule; Take 1 capsule by mouth every morning  Dispense: 90 capsule; Refill: 3    7. Screening for breast cancer    - [facility] DIGITAL SCREEN W CAD BILATERAL; Future      Return in about 6 months (around 11/30/2019).     Electronically signed by Shilpa Garcia MD on 5/31/19 at 9:26 AM

## 2019-06-28 DIAGNOSIS — Z12.39 SCREENING FOR BREAST CANCER: ICD-10-CM

## 2019-07-08 LAB
ALBUMIN SERPL-MCNC: 4.1 G/DL
ALP BLD-CCNC: 89 U/L
ALT SERPL-CCNC: 35 U/L
ANION GAP SERPL CALCULATED.3IONS-SCNC: NORMAL MMOL/L
AST SERPL-CCNC: 31 U/L
AVERAGE GLUCOSE: NORMAL
BILIRUB SERPL-MCNC: 0.7 MG/DL (ref 0.1–1.4)
BUN BLDV-MCNC: 17 MG/DL
CALCIUM SERPL-MCNC: 10 MG/DL
CHLORIDE BLD-SCNC: 104 MMOL/L
CHOLESTEROL, TOTAL: 137 MG/DL
CHOLESTEROL/HDL RATIO: 3.8
CO2: 30 MMOL/L
CREAT SERPL-MCNC: 0.78 MG/DL
GFR CALCULATED: NORMAL
GLUCOSE BLD-MCNC: 130 MG/DL
HBA1C MFR BLD: 6.5 %
HDLC SERPL-MCNC: 36 MG/DL (ref 35–70)
LDL CHOLESTEROL CALCULATED: 72 MG/DL (ref 0–160)
POTASSIUM SERPL-SCNC: 4.6 MMOL/L
SODIUM BLD-SCNC: 138 MMOL/L
TOTAL PROTEIN: 6.9
TRIGL SERPL-MCNC: 196 MG/DL
TSH SERPL DL<=0.05 MIU/L-ACNC: 1.9 UIU/ML
VLDLC SERPL CALC-MCNC: 101 MG/DL

## 2019-07-17 ENCOUNTER — HOSPITAL ENCOUNTER (OUTPATIENT)
Age: 74
Setting detail: OBSERVATION
Discharge: HOME | End: 2019-07-18
Attending: EMERGENCY MEDICINE | Admitting: INTERNAL MEDICINE
Payer: MEDICARE

## 2019-07-17 ENCOUNTER — APPOINTMENT (OUTPATIENT)
Dept: GENERAL RADIOLOGY | Age: 74
End: 2019-07-17
Payer: MEDICARE

## 2019-07-17 ENCOUNTER — TELEPHONE (OUTPATIENT)
Dept: ADMINISTRATIVE | Age: 74
End: 2019-07-17

## 2019-07-17 DIAGNOSIS — R07.9 CHEST PAIN, UNSPECIFIED TYPE: Primary | ICD-10-CM

## 2019-07-17 PROBLEM — E83.52 HYPERCALCEMIA: Status: ACTIVE | Noted: 2019-07-17

## 2019-07-17 LAB
ALBUMIN SERPL-MCNC: 4.6 G/DL (ref 3.5–5.2)
ALP BLD-CCNC: 102 U/L (ref 35–104)
ALT SERPL-CCNC: 36 U/L (ref 0–32)
ANION GAP SERPL CALCULATED.3IONS-SCNC: 9 MMOL/L (ref 7–16)
AST SERPL-CCNC: 35 U/L (ref 0–31)
BASOPHILS ABSOLUTE: 0.04 E9/L (ref 0–0.2)
BASOPHILS RELATIVE PERCENT: 0.7 % (ref 0–2)
BILIRUB SERPL-MCNC: 0.5 MG/DL (ref 0–1.2)
BUN BLDV-MCNC: 12 MG/DL (ref 8–23)
CALCIUM SERPL-MCNC: 10.3 MG/DL (ref 8.6–10.2)
CHLORIDE BLD-SCNC: 104 MMOL/L (ref 98–107)
CO2: 27 MMOL/L (ref 22–29)
CREAT SERPL-MCNC: 0.8 MG/DL (ref 0.5–1)
EKG ATRIAL RATE: 90 BPM
EKG P AXIS: 54 DEGREES
EKG P-R INTERVAL: 166 MS
EKG Q-T INTERVAL: 384 MS
EKG QRS DURATION: 86 MS
EKG QTC CALCULATION (BAZETT): 469 MS
EKG R AXIS: 27 DEGREES
EKG T AXIS: 48 DEGREES
EKG VENTRICULAR RATE: 90 BPM
EOSINOPHILS ABSOLUTE: 0.2 E9/L (ref 0.05–0.5)
EOSINOPHILS RELATIVE PERCENT: 3.3 % (ref 0–6)
GFR AFRICAN AMERICAN: >60
GFR NON-AFRICAN AMERICAN: >60 ML/MIN/1.73
GLUCOSE BLD-MCNC: 140 MG/DL (ref 74–99)
HCT VFR BLD CALC: 42.9 % (ref 34–48)
HEMOGLOBIN: 14.3 G/DL (ref 11.5–15.5)
IMMATURE GRANULOCYTES #: 0.03 E9/L
IMMATURE GRANULOCYTES %: 0.5 % (ref 0–5)
INR BLD: 1.1
LYMPHOCYTES ABSOLUTE: 1.63 E9/L (ref 1.5–4)
LYMPHOCYTES RELATIVE PERCENT: 26.6 % (ref 20–42)
MCH RBC QN AUTO: 32.3 PG (ref 26–35)
MCHC RBC AUTO-ENTMCNC: 33.3 % (ref 32–34.5)
MCV RBC AUTO: 96.8 FL (ref 80–99.9)
MONOCYTES ABSOLUTE: 0.32 E9/L (ref 0.1–0.95)
MONOCYTES RELATIVE PERCENT: 5.2 % (ref 2–12)
NEUTROPHILS ABSOLUTE: 3.91 E9/L (ref 1.8–7.3)
NEUTROPHILS RELATIVE PERCENT: 63.7 % (ref 43–80)
PDW BLD-RTO: 13.1 FL (ref 11.5–15)
PLATELET # BLD: 167 E9/L (ref 130–450)
PMV BLD AUTO: 12.5 FL (ref 7–12)
POTASSIUM SERPL-SCNC: 4.4 MMOL/L (ref 3.5–5)
PRO-BNP: 224 PG/ML (ref 0–450)
PROTHROMBIN TIME: 12.7 SEC (ref 9.3–12.4)
RBC # BLD: 4.43 E12/L (ref 3.5–5.5)
SODIUM BLD-SCNC: 140 MMOL/L (ref 132–146)
TOTAL PROTEIN: 8.1 G/DL (ref 6.4–8.3)
TROPONIN: <0.01 NG/ML (ref 0–0.03)
TSH SERPL DL<=0.05 MIU/L-ACNC: 1.46 UIU/ML (ref 0.27–4.2)
WBC # BLD: 6.1 E9/L (ref 4.5–11.5)

## 2019-07-17 PROCEDURE — 84484 ASSAY OF TROPONIN QUANT: CPT

## 2019-07-17 PROCEDURE — 85610 PROTHROMBIN TIME: CPT

## 2019-07-17 PROCEDURE — 36415 COLL VENOUS BLD VENIPUNCTURE: CPT

## 2019-07-17 PROCEDURE — 83880 ASSAY OF NATRIURETIC PEPTIDE: CPT

## 2019-07-17 PROCEDURE — 93005 ELECTROCARDIOGRAM TRACING: CPT | Performed by: EMERGENCY MEDICINE

## 2019-07-17 PROCEDURE — 6370000000 HC RX 637 (ALT 250 FOR IP): Performed by: NURSE PRACTITIONER

## 2019-07-17 PROCEDURE — G0378 HOSPITAL OBSERVATION PER HR: HCPCS

## 2019-07-17 PROCEDURE — 85025 COMPLETE CBC W/AUTO DIFF WBC: CPT

## 2019-07-17 PROCEDURE — APPSS60 APP SPLIT SHARED TIME 46-60 MINUTES: Performed by: NURSE PRACTITIONER

## 2019-07-17 PROCEDURE — 80053 COMPREHEN METABOLIC PANEL: CPT

## 2019-07-17 PROCEDURE — 93010 ELECTROCARDIOGRAM REPORT: CPT | Performed by: INTERNAL MEDICINE

## 2019-07-17 PROCEDURE — 84443 ASSAY THYROID STIM HORMONE: CPT

## 2019-07-17 PROCEDURE — 99285 EMERGENCY DEPT VISIT HI MDM: CPT

## 2019-07-17 PROCEDURE — 99215 OFFICE O/P EST HI 40 MIN: CPT | Performed by: INTERNAL MEDICINE

## 2019-07-17 PROCEDURE — 6370000000 HC RX 637 (ALT 250 FOR IP): Performed by: INTERNAL MEDICINE

## 2019-07-17 PROCEDURE — 71045 X-RAY EXAM CHEST 1 VIEW: CPT

## 2019-07-17 RX ORDER — ATORVASTATIN CALCIUM 40 MG/1
40 TABLET, FILM COATED ORAL NIGHTLY
Status: DISCONTINUED | OUTPATIENT
Start: 2019-07-17 | End: 2019-07-17

## 2019-07-17 RX ORDER — AMLODIPINE BESYLATE 5 MG/1
5 TABLET ORAL NIGHTLY
Status: DISCONTINUED | OUTPATIENT
Start: 2019-07-17 | End: 2019-07-18 | Stop reason: HOSPADM

## 2019-07-17 RX ORDER — OLMESARTAN MEDOXOMIL 20 MG/1
20 TABLET ORAL EVERY MORNING
Status: DISCONTINUED | OUTPATIENT
Start: 2019-07-17 | End: 2019-07-17 | Stop reason: CLARIF

## 2019-07-17 RX ORDER — ROSUVASTATIN CALCIUM 10 MG/1
10 TABLET, COATED ORAL NIGHTLY
Status: DISCONTINUED | OUTPATIENT
Start: 2019-07-17 | End: 2019-07-17

## 2019-07-17 RX ORDER — ROSUVASTATIN CALCIUM 20 MG/1
20 TABLET, COATED ORAL NIGHTLY
Status: DISCONTINUED | OUTPATIENT
Start: 2019-07-17 | End: 2019-07-18 | Stop reason: HOSPADM

## 2019-07-17 RX ORDER — OMEPRAZOLE 20 MG/1
40 CAPSULE, DELAYED RELEASE ORAL EVERY MORNING
Status: DISCONTINUED | OUTPATIENT
Start: 2019-07-17 | End: 2019-07-17 | Stop reason: CLARIF

## 2019-07-17 RX ORDER — METOPROLOL SUCCINATE 25 MG/1
12.5 TABLET, EXTENDED RELEASE ORAL NIGHTLY
Status: DISCONTINUED | OUTPATIENT
Start: 2019-07-17 | End: 2019-07-18 | Stop reason: HOSPADM

## 2019-07-17 RX ORDER — ASPIRIN 325 MG
650 TABLET ORAL ONCE
Status: ON HOLD | COMMUNITY
End: 2019-07-18 | Stop reason: HOSPADM

## 2019-07-17 RX ORDER — PANTOPRAZOLE SODIUM 40 MG/1
40 TABLET, DELAYED RELEASE ORAL
Status: DISCONTINUED | OUTPATIENT
Start: 2019-07-18 | End: 2019-07-18 | Stop reason: HOSPADM

## 2019-07-17 RX ORDER — ASPIRIN 81 MG/1
81 TABLET, CHEWABLE ORAL DAILY
Status: DISCONTINUED | OUTPATIENT
Start: 2019-07-18 | End: 2019-07-18 | Stop reason: HOSPADM

## 2019-07-17 RX ORDER — LOSARTAN POTASSIUM 50 MG/1
50 TABLET ORAL EVERY MORNING
Status: DISCONTINUED | OUTPATIENT
Start: 2019-07-18 | End: 2019-07-18 | Stop reason: HOSPADM

## 2019-07-17 RX ORDER — ONDANSETRON 2 MG/ML
4 INJECTION INTRAMUSCULAR; INTRAVENOUS EVERY 6 HOURS PRN
Status: DISCONTINUED | OUTPATIENT
Start: 2019-07-17 | End: 2019-07-18 | Stop reason: HOSPADM

## 2019-07-17 RX ORDER — LEVOTHYROXINE SODIUM 0.07 MG/1
75 TABLET ORAL EVERY MORNING
Status: DISCONTINUED | OUTPATIENT
Start: 2019-07-18 | End: 2019-07-18 | Stop reason: HOSPADM

## 2019-07-17 RX ORDER — NITROGLYCERIN 0.4 MG/1
0.4 TABLET SUBLINGUAL EVERY 5 MIN PRN
Status: DISCONTINUED | OUTPATIENT
Start: 2019-07-17 | End: 2019-07-18 | Stop reason: HOSPADM

## 2019-07-17 RX ADMIN — METOPROLOL SUCCINATE 12.5 MG: 25 TABLET, EXTENDED RELEASE ORAL at 21:21

## 2019-07-17 RX ADMIN — ROSUVASTATIN CALCIUM 20 MG: 20 TABLET, FILM COATED ORAL at 21:21

## 2019-07-17 RX ADMIN — AMLODIPINE BESYLATE 5 MG: 5 TABLET ORAL at 21:21

## 2019-07-17 ASSESSMENT — PAIN SCALES - GENERAL
PAINLEVEL_OUTOF10: 0

## 2019-07-17 ASSESSMENT — ENCOUNTER SYMPTOMS
NAUSEA: 0
BACK PAIN: 0
VOMITING: 0
ABDOMINAL PAIN: 0
DIARRHEA: 0
RHINORRHEA: 0
CONSTIPATION: 0
BLOOD IN STOOL: 0
SHORTNESS OF BREATH: 0
CHEST TIGHTNESS: 1
COUGH: 0
SORE THROAT: 0
WHEEZING: 0

## 2019-07-17 ASSESSMENT — PAIN DESCRIPTION - ORIENTATION: ORIENTATION: LEFT

## 2019-07-17 ASSESSMENT — PAIN DESCRIPTION - LOCATION: LOCATION: CHEST;SHOULDER

## 2019-07-17 ASSESSMENT — PAIN DESCRIPTION - DESCRIPTORS: DESCRIPTORS: PRESSURE

## 2019-07-17 ASSESSMENT — PAIN DESCRIPTION - PAIN TYPE: TYPE: ACUTE PAIN

## 2019-07-17 NOTE — ED PROVIDER NOTES
Cardiac surgery (4/11); Cardiac catheterization (5/2011); Diagnostic Cardiac Cath Lab Procedure; Coronary angioplasty; Colonoscopy; Endoscopy, colon, diagnostic; Hysterectomy; Appendectomy; Colonoscopy (07/02/2012); Carpal tunnel release (Bilateral); Cardiac catheterization (1-); and eye surgery (Right). Social History:  reports that she quit smoking about 44 years ago. Her smoking use included cigarettes. She started smoking about 59 years ago. She has a 7.50 pack-year smoking history. She has never used smokeless tobacco. She reports that she does not drink alcohol or use drugs. Family History: family history includes Heart Attack in her father, mother, and sister; Heart Surgery in her brother. The patients home medications have been reviewed.     Allergies: Epinephrine and Other    -------------------------------------------------- RESULTS -------------------------------------------------    LABS:  Results for orders placed or performed during the hospital encounter of 07/17/19   CBC Auto Differential   Result Value Ref Range    WBC 6.1 4.5 - 11.5 E9/L    RBC 4.43 3.50 - 5.50 E12/L    Hemoglobin 14.3 11.5 - 15.5 g/dL    Hematocrit 42.9 34.0 - 48.0 %    MCV 96.8 80.0 - 99.9 fL    MCH 32.3 26.0 - 35.0 pg    MCHC 33.3 32.0 - 34.5 %    RDW 13.1 11.5 - 15.0 fL    Platelets 855 732 - 883 E9/L    MPV 12.5 (H) 7.0 - 12.0 fL    Neutrophils % 63.7 43.0 - 80.0 %    Immature Granulocytes % 0.5 0.0 - 5.0 %    Lymphocytes % 26.6 20.0 - 42.0 %    Monocytes % 5.2 2.0 - 12.0 %    Eosinophils % 3.3 0.0 - 6.0 %    Basophils % 0.7 0.0 - 2.0 %    Neutrophils # 3.91 1.80 - 7.30 E9/L    Immature Granulocytes # 0.03 E9/L    Lymphocytes # 1.63 1.50 - 4.00 E9/L    Monocytes # 0.32 0.10 - 0.95 E9/L    Eosinophils # 0.20 0.05 - 0.50 E9/L    Basophils # 0.04 0.00 - 0.20 E9/L   Protime-INR   Result Value Ref Range    Protime 12.7 (H) 9.3 - 12.4 sec    INR 1.1    Comprehensive Metabolic Panel   Result Value Ref Range    Sodium

## 2019-07-17 NOTE — H&P
ANGIOPLASTY      DIAGNOSTIC CARDIAC CATH LAB PROCEDURE      ENDOSCOPY, COLON, DIAGNOSTIC      EYE SURGERY Right     cataract    HYSTERECTOMY         Family History   Problem Relation Age of Onset    Heart Attack Mother     Heart Attack Father     Heart Surgery Brother     Heart Attack Sister        Social History  Patient lives at home with her . Employment: retired  Illicit drug use- denies   TOBACCO:   reports that she quit smoking about 44 years ago. Her smoking use included cigarettes. She started smoking about 59 years ago. She has a 7.50 pack-year smoking history. She has never used smokeless tobacco.  ETOH:   reports that she does not drink alcohol. Home Medications  Prior to Admission medications    Medication Sig Start Date End Date Taking?  Authorizing Provider   rosuvastatin (CRESTOR) 10 MG tablet Take 1 tablet by mouth nightly 5/31/19  Yes Julia Mendoza MD   amLODIPine (NORVASC) 5 MG tablet Take 0.5 tablets by mouth daily  Patient taking differently: Take 5 mg by mouth nightly  5/31/19  Yes Julia Mendoza MD   SYNTHROID 75 MCG tablet Take 1 tablet by mouth daily  Patient taking differently: Take 75 mcg by mouth every morning  5/31/19  Yes Julia Mendoza MD   omeprazole (PRILOSEC) 40 MG delayed release capsule Take 1 capsule by mouth every morning 5/31/19  Yes Julia Mendoza MD   olmesartan (BENICAR) 20 MG tablet Take 1 tablet by mouth daily  Patient taking differently: Take 20 mg by mouth every morning  5/31/19  Yes Julia Mendoza MD   metoprolol succinate (TOPROL XL) 25 MG extended release tablet Take 0.5 tablets by mouth 2 times daily 12.5mg BID  Patient taking differently: Take 12.5 mg by mouth nightly  5/31/19  Yes Julia Mendoza MD   metFORMIN (GLUCOPHAGE) 500 MG tablet Take 1 tablet by mouth Daily with supper 5/31/19  Yes Julia Mendoza MD   nitroGLYCERIN (NITROSTAT) 0.4 MG SL tablet Place 1 tablet under the tongue every 5 minutes as needed for Chest pain 12/21/18  Yes Jenae Meraz BRANDON Traylor   vitamin E 400 UNIT capsule Take 400 Units by mouth every morning   Yes Historical Provider, MD   vitamin C (ASCORBIC ACID) 500 MG tablet Take 1,000 mg by mouth daily    Yes Historical Provider, MD   aspirin 81 MG chewable tablet Take 81 mg by mouth every morning    Yes Historical Provider, MD   FREESTYLE LITE strip  12/22/18   Historical Provider, MD       Allergies  Allergies   Allergen Reactions    Epinephrine      \"makes me spacey\"    Other Nausea Only     unknown antibiotic       Review of Systems  Please see HPI above. All bolded are positive. All un-bolded are negative.   Constitutional Symptoms: fever, chills, fatigue, generalized weakness, diaphoresis, increase in thirst, loss of appetite  Eyes: vision change   Ears, Nose, Mouth, Throat: hearing loss, nasal congestion, sores in the mouth  Cardiovascular: chest pain, chest heaviness, palpitations  Respiratory: shortness of breath, wheezing, coughing  Gastrointestinal: abdominal pain, nausea, vomiting, diarrhea, constipation, melena, hematochezia, hematemesis  Genitourinary: dysuria, hematuria, increase in frequency  Musculoskeletal: lower extremity edema, myalgias, arthralgias, back pain  Integumentary: rashes, itching   Neurological: headache, lightheadedness, dizziness, confusion, syncope, numbness, tingling, focal weakness  Psychiatric: depression, suicidal ideation, anxiety  Endocrine: unintentional weight change  Hematologic/Lymphatic: lymphadenopathy, easy bruising, easy bleeding   Allergic/Immunologic: recurrent infections      Objective  VITALS:  BP (!) 110/57   Pulse 72   Temp 98.3 °F (36.8 °C) (Oral)   Resp 18   Ht 4' 11\" (1.499 m)   Wt 171 lb 12.8 oz (77.9 kg)   SpO2 98%   BMI 34.70 kg/m²     Physical Exam:  General: awake, alert, oriented to person, place, time, and purpose, appears stated age, cooperative, no acute distress, pleasant, appropriate mood, Anxious  Eyes: conjunctivae/corneas clear, sclera non icteric, EOMI  Ears:

## 2019-07-18 ENCOUNTER — APPOINTMENT (OUTPATIENT)
Dept: NUCLEAR MEDICINE | Age: 74
End: 2019-07-18
Payer: MEDICARE

## 2019-07-18 VITALS
RESPIRATION RATE: 18 BRPM | HEIGHT: 59 IN | SYSTOLIC BLOOD PRESSURE: 110 MMHG | DIASTOLIC BLOOD PRESSURE: 57 MMHG | OXYGEN SATURATION: 98 % | BODY MASS INDEX: 34.64 KG/M2 | WEIGHT: 171.8 LBS | TEMPERATURE: 98.3 F | HEART RATE: 72 BPM

## 2019-07-18 LAB
ALBUMIN SERPL-MCNC: 3.8 G/DL (ref 3.5–5.2)
ALP BLD-CCNC: 85 U/L (ref 35–104)
ALT SERPL-CCNC: 30 U/L (ref 0–32)
ANION GAP SERPL CALCULATED.3IONS-SCNC: 8 MMOL/L (ref 7–16)
AST SERPL-CCNC: 29 U/L (ref 0–31)
BILIRUB SERPL-MCNC: 0.7 MG/DL (ref 0–1.2)
BUN BLDV-MCNC: 15 MG/DL (ref 8–23)
CALCIUM SERPL-MCNC: 9.9 MG/DL (ref 8.6–10.2)
CHLORIDE BLD-SCNC: 104 MMOL/L (ref 98–107)
CHOLESTEROL, TOTAL: 120 MG/DL (ref 0–199)
CO2: 25 MMOL/L (ref 22–29)
CREAT SERPL-MCNC: 0.9 MG/DL (ref 0.5–1)
GFR AFRICAN AMERICAN: >60
GFR NON-AFRICAN AMERICAN: >60 ML/MIN/1.73
GLUCOSE BLD-MCNC: 120 MG/DL (ref 74–99)
HCT VFR BLD CALC: 40 % (ref 34–48)
HDLC SERPL-MCNC: 35 MG/DL
HEMOGLOBIN: 13.2 G/DL (ref 11.5–15.5)
LDL CHOLESTEROL CALCULATED: 60 MG/DL (ref 0–99)
LV EF: 70 %
LVEF MODALITY: NORMAL
MCH RBC QN AUTO: 32.1 PG (ref 26–35)
MCHC RBC AUTO-ENTMCNC: 33 % (ref 32–34.5)
MCV RBC AUTO: 97.3 FL (ref 80–99.9)
PDW BLD-RTO: 13.2 FL (ref 11.5–15)
PLATELET # BLD: 152 E9/L (ref 130–450)
PMV BLD AUTO: 12.8 FL (ref 7–12)
POTASSIUM REFLEX MAGNESIUM: 4.6 MMOL/L (ref 3.5–5)
RBC # BLD: 4.11 E12/L (ref 3.5–5.5)
SODIUM BLD-SCNC: 137 MMOL/L (ref 132–146)
TOTAL PROTEIN: 6.9 G/DL (ref 6.4–8.3)
TRIGL SERPL-MCNC: 124 MG/DL (ref 0–149)
VLDLC SERPL CALC-MCNC: 25 MG/DL
WBC # BLD: 6 E9/L (ref 4.5–11.5)

## 2019-07-18 PROCEDURE — A9500 TC99M SESTAMIBI: HCPCS | Performed by: RADIOLOGY

## 2019-07-18 PROCEDURE — 80061 LIPID PANEL: CPT

## 2019-07-18 PROCEDURE — 99214 OFFICE O/P EST MOD 30 MIN: CPT | Performed by: INTERNAL MEDICINE

## 2019-07-18 PROCEDURE — 36415 COLL VENOUS BLD VENIPUNCTURE: CPT

## 2019-07-18 PROCEDURE — 93017 CV STRESS TEST TRACING ONLY: CPT

## 2019-07-18 PROCEDURE — G0378 HOSPITAL OBSERVATION PER HR: HCPCS

## 2019-07-18 PROCEDURE — 80053 COMPREHEN METABOLIC PANEL: CPT

## 2019-07-18 PROCEDURE — 3430000000 HC RX DIAGNOSTIC RADIOPHARMACEUTICAL: Performed by: RADIOLOGY

## 2019-07-18 PROCEDURE — 93018 CV STRESS TEST I&R ONLY: CPT | Performed by: INTERNAL MEDICINE

## 2019-07-18 PROCEDURE — 78452 HT MUSCLE IMAGE SPECT MULT: CPT

## 2019-07-18 PROCEDURE — 85027 COMPLETE CBC AUTOMATED: CPT

## 2019-07-18 PROCEDURE — 6370000000 HC RX 637 (ALT 250 FOR IP): Performed by: INTERNAL MEDICINE

## 2019-07-18 PROCEDURE — 93016 CV STRESS TEST SUPVJ ONLY: CPT | Performed by: INTERNAL MEDICINE

## 2019-07-18 RX ADMIN — Medication 30 MILLICURIE: at 11:00

## 2019-07-18 RX ADMIN — LOSARTAN POTASSIUM 50 MG: 50 TABLET, FILM COATED ORAL at 12:32

## 2019-07-18 RX ADMIN — Medication 10 MILLICURIE: at 09:00

## 2019-07-18 RX ADMIN — ASPIRIN 81 MG 81 MG: 81 TABLET ORAL at 12:32

## 2019-07-18 RX ADMIN — PANTOPRAZOLE SODIUM 40 MG: 40 TABLET, DELAYED RELEASE ORAL at 06:16

## 2019-07-18 RX ADMIN — LEVOTHYROXINE SODIUM 75 MCG: 75 TABLET ORAL at 06:16

## 2019-07-18 ASSESSMENT — PAIN SCALES - GENERAL: PAINLEVEL_OUTOF10: 0

## 2019-07-18 NOTE — PROGRESS NOTES
No adenopathy or lymphedema   SKIN: warm, dry, no rash on exposed areas   PSYCH:  A&O; Mood, affect and behavior appropriate    Current Inpatient Medications:   aspirin  81 mg Oral Daily    enoxaparin  40 mg Subcutaneous Daily    amLODIPine  5 mg Oral Nightly    metoprolol succinate  12.5 mg Oral Nightly    levothyroxine  75 mcg Oral QAM    losartan  50 mg Oral QAM    pantoprazole  40 mg Oral QAM AC    rosuvastatin  20 mg Oral Nightly       IV Infusions (if any):      LABS:    CBC:   Recent Labs     07/17/19  1036 07/18/19  0510   WBC 6.1 6.0   HGB 14.3 13.2   HCT 42.9 40.0    152     BMP:   Recent Labs     07/17/19  1036 07/18/19  0510    137   K 4.4 4.6   CO2 27 25   BUN 12 15   CREATININE 0.8 0.9   LABGLOM >60 >60   GLUCOSE 140* 120*     BNP: No results for input(s): BNP in the last 72 hours. PT/INR:   Recent Labs     07/17/19  1036   PROTIME 12.7*   INR 1.1     Mag: No results for input(s): MG in the last 72 hours. Phos: No results for input(s): PHOS in the last 72 hours. TSH:   Recent Labs     07/17/19  1036   TSH 1.460     HgA1c:   Lab Results   Component Value Date    LABA1C 6.2 05/31/2019     No results found for: EAG  Cardiac Enzymes:   Recent Labs     07/17/19  1036 07/17/19  1350 07/17/19  1935   TROPONINI <0.01 <0.01 <0.01     Fasting Lipid Panel:   Lab Results   Component Value Date    HDL 35 07/18/2019    LDLCALC 60 07/18/2019    TRIG 124 07/18/2019     Liver Profile:   Recent Labs     07/17/19  1036 07/18/19  0510   AST 35* 29   ALT 36* 30   LABALBU 4.6 3.8       Diagnostics:        Stress Test: negative       Telemetry: Sinus    ASSESSMENT:  1. Atypical chest pain  2.  CAD               PLAN:  OK for discharge        Electronically signed by Maria Isabel Owen MD on 7/18/2019 at 12:28 PM

## 2019-07-19 NOTE — DISCHARGE SUMMARY
Please see H&P from the same day as discharge.     Electronically signed by Thomas Figueroa DO on 7/18/2019 at 9:18 PM

## 2019-08-12 ENCOUNTER — OFFICE VISIT (OUTPATIENT)
Dept: CARDIOLOGY CLINIC | Age: 74
End: 2019-08-12
Payer: MEDICARE

## 2019-08-12 VITALS
DIASTOLIC BLOOD PRESSURE: 82 MMHG | HEART RATE: 83 BPM | BODY MASS INDEX: 32 KG/M2 | WEIGHT: 163 LBS | HEIGHT: 60 IN | SYSTOLIC BLOOD PRESSURE: 120 MMHG | RESPIRATION RATE: 16 BRPM

## 2019-08-12 DIAGNOSIS — I10 ESSENTIAL HYPERTENSION, BENIGN: ICD-10-CM

## 2019-08-12 DIAGNOSIS — Z95.5 STENTED CORONARY ARTERY: ICD-10-CM

## 2019-08-12 DIAGNOSIS — I25.119 CORONARY ARTERY DISEASE INVOLVING NATIVE CORONARY ARTERY WITH ANGINA PECTORIS, UNSPECIFIED WHETHER NATIVE OR TRANSPLANTED HEART (HCC): Primary | ICD-10-CM

## 2019-08-12 DIAGNOSIS — E78.00 PURE HYPERCHOLESTEROLEMIA: ICD-10-CM

## 2019-08-12 PROCEDURE — 3017F COLORECTAL CA SCREEN DOC REV: CPT | Performed by: INTERNAL MEDICINE

## 2019-08-12 PROCEDURE — 93000 ELECTROCARDIOGRAM COMPLETE: CPT | Performed by: INTERNAL MEDICINE

## 2019-08-12 PROCEDURE — 1123F ACP DISCUSS/DSCN MKR DOCD: CPT | Performed by: INTERNAL MEDICINE

## 2019-08-12 PROCEDURE — 4040F PNEUMOC VAC/ADMIN/RCVD: CPT | Performed by: INTERNAL MEDICINE

## 2019-08-12 PROCEDURE — G8427 DOCREV CUR MEDS BY ELIG CLIN: HCPCS | Performed by: INTERNAL MEDICINE

## 2019-08-12 PROCEDURE — G8400 PT W/DXA NO RESULTS DOC: HCPCS | Performed by: INTERNAL MEDICINE

## 2019-08-12 PROCEDURE — G8417 CALC BMI ABV UP PARAM F/U: HCPCS | Performed by: INTERNAL MEDICINE

## 2019-08-12 PROCEDURE — G8598 ASA/ANTIPLAT THER USED: HCPCS | Performed by: INTERNAL MEDICINE

## 2019-08-12 PROCEDURE — 99213 OFFICE O/P EST LOW 20 MIN: CPT | Performed by: INTERNAL MEDICINE

## 2019-08-12 PROCEDURE — 1090F PRES/ABSN URINE INCON ASSESS: CPT | Performed by: INTERNAL MEDICINE

## 2019-08-12 PROCEDURE — 1036F TOBACCO NON-USER: CPT | Performed by: INTERNAL MEDICINE

## 2019-08-12 NOTE — PROGRESS NOTES
organizations: Not on file     Relationship status: Not on file    Intimate partner violence:     Fear of current or ex partner: Not on file     Emotionally abused: Not on file     Physically abused: Not on file     Forced sexual activity: Not on file   Other Topics Concern    Not on file   Social History Narrative    Not on file       Past Medical History:   Diagnosis Date    CAD (coronary artery disease)     follows with  [employer] yearly (next appt3/22/18)    Diabetes mellitus ([facility] [address]     History of colon polyps     Hyperinsulinism     Hyperlipidemia     Hypertension     Hypothyroidism        PHYSICAL EXAM:  CONSTITUTIONAL:  Well developed, well nourished    Vitals:    08/12/19 0929   BP: 120/82   Pulse: 83   Resp: 16   Weight: 163 lb (73.9 kg)   Height: 5' (1.524 m)     HEAD & FACE: Normocephalic. Symmetric. EYES: No xanthelasma. Conjunctivae not injected. EARS, NOSE, MOUTH & THROAT: Good dentition. No oral pallor or cyanosis. NECK: No JVD at 30 degrees. No thyromegaly. RESPIRATORY: Clear to auscultation and percussion in all fields. No use of accessory muscle or intercostal retractions. CARDIOVASCULAR: Regular rate and rhythm. No lifts or thrills on palpitation. Auscultation with normal S1-S2 in intensity and splitting. No carotid bruits. Abdominal aorta not enlarged. Femoral arteries without bruits. Pedal pulses 2+. No edema. ABDOMEN: Soft without hepatic or splenic enlargement. No tenderness. MUSCULOSKELETAL: No kyphosis or scoliosis of the back. Good muscle strength and tone. No muscle atrophy. Normal gait and ability to undergo exercise stress testing. EXTREMITIES: No clubbing or cyanosis. SKIN: No Xanthomas or ulcerations. NEUROLOGIC: Oriented to time, place and person. Normal mood and affect. LYMPHATIC:  No palpable neck or supraclavicular nodes. No splenomegaly. EKG: Normal sinus rhythm. No change compared to prior tracing.       ASSESSMENT:

## 2019-08-26 LAB — DIABETIC RETINOPATHY: NEGATIVE

## 2019-09-04 ENCOUNTER — TELEPHONE (OUTPATIENT)
Dept: ADMINISTRATIVE | Age: 74
End: 2019-09-04

## 2019-10-19 ENCOUNTER — OFFICE VISIT (OUTPATIENT)
Dept: FAMILY MEDICINE CLINIC | Age: 74
End: 2019-10-19
Payer: MEDICARE

## 2019-10-19 VITALS
HEART RATE: 90 BPM | OXYGEN SATURATION: 92 % | SYSTOLIC BLOOD PRESSURE: 148 MMHG | TEMPERATURE: 98.4 F | HEIGHT: 59 IN | BODY MASS INDEX: 33.26 KG/M2 | DIASTOLIC BLOOD PRESSURE: 86 MMHG | WEIGHT: 165 LBS

## 2019-10-19 DIAGNOSIS — J40 BRONCHITIS: ICD-10-CM

## 2019-10-19 DIAGNOSIS — J01.80 ACUTE NON-RECURRENT SINUSITIS OF OTHER SINUS: Primary | ICD-10-CM

## 2019-10-19 PROBLEM — J01.90 ACUTE INFECTION OF NASAL SINUS: Status: ACTIVE | Noted: 2019-10-19

## 2019-10-19 PROCEDURE — 1036F TOBACCO NON-USER: CPT | Performed by: FAMILY MEDICINE

## 2019-10-19 PROCEDURE — 96372 THER/PROPH/DIAG INJ SC/IM: CPT | Performed by: FAMILY MEDICINE

## 2019-10-19 PROCEDURE — G8417 CALC BMI ABV UP PARAM F/U: HCPCS | Performed by: FAMILY MEDICINE

## 2019-10-19 PROCEDURE — G8484 FLU IMMUNIZE NO ADMIN: HCPCS | Performed by: FAMILY MEDICINE

## 2019-10-19 PROCEDURE — 4040F PNEUMOC VAC/ADMIN/RCVD: CPT | Performed by: FAMILY MEDICINE

## 2019-10-19 PROCEDURE — G8400 PT W/DXA NO RESULTS DOC: HCPCS | Performed by: FAMILY MEDICINE

## 2019-10-19 PROCEDURE — G8598 ASA/ANTIPLAT THER USED: HCPCS | Performed by: FAMILY MEDICINE

## 2019-10-19 PROCEDURE — 99213 OFFICE O/P EST LOW 20 MIN: CPT | Performed by: FAMILY MEDICINE

## 2019-10-19 PROCEDURE — 1090F PRES/ABSN URINE INCON ASSESS: CPT | Performed by: FAMILY MEDICINE

## 2019-10-19 PROCEDURE — G8427 DOCREV CUR MEDS BY ELIG CLIN: HCPCS | Performed by: FAMILY MEDICINE

## 2019-10-19 PROCEDURE — 1123F ACP DISCUSS/DSCN MKR DOCD: CPT | Performed by: FAMILY MEDICINE

## 2019-10-19 PROCEDURE — 3017F COLORECTAL CA SCREEN DOC REV: CPT | Performed by: FAMILY MEDICINE

## 2019-10-19 RX ORDER — ALBUTEROL SULFATE 2.5 MG/3ML
2.5 SOLUTION RESPIRATORY (INHALATION) ONCE
Status: COMPLETED | OUTPATIENT
Start: 2019-10-19 | End: 2019-10-19

## 2019-10-19 RX ORDER — METHYLPREDNISOLONE ACETATE 40 MG/ML
40 INJECTION, SUSPENSION INTRA-ARTICULAR; INTRALESIONAL; INTRAMUSCULAR; SOFT TISSUE ONCE
Status: COMPLETED | OUTPATIENT
Start: 2019-10-19 | End: 2019-10-19

## 2019-10-19 RX ORDER — AZITHROMYCIN 250 MG/1
250 TABLET, FILM COATED ORAL SEE ADMIN INSTRUCTIONS
Qty: 6 TABLET | Refills: 0 | Status: SHIPPED | OUTPATIENT
Start: 2019-10-19 | End: 2019-10-24

## 2019-10-19 RX ORDER — ALBUTEROL SULFATE 90 UG/1
2 AEROSOL, METERED RESPIRATORY (INHALATION) 4 TIMES DAILY PRN
Qty: 1 INHALER | Refills: 0 | Status: SHIPPED | OUTPATIENT
Start: 2019-10-19 | End: 2019-11-21

## 2019-10-19 RX ADMIN — METHYLPREDNISOLONE ACETATE 40 MG: 40 INJECTION, SUSPENSION INTRA-ARTICULAR; INTRALESIONAL; INTRAMUSCULAR; SOFT TISSUE at 08:43

## 2019-10-19 RX ADMIN — ALBUTEROL SULFATE 2.5 MG: 2.5 SOLUTION RESPIRATORY (INHALATION) at 08:44

## 2019-11-21 ENCOUNTER — OFFICE VISIT (OUTPATIENT)
Dept: FAMILY MEDICINE CLINIC | Age: 74
End: 2019-11-21
Payer: MEDICARE

## 2019-11-21 VITALS
SYSTOLIC BLOOD PRESSURE: 110 MMHG | OXYGEN SATURATION: 94 % | TEMPERATURE: 97.7 F | HEIGHT: 59 IN | DIASTOLIC BLOOD PRESSURE: 62 MMHG | WEIGHT: 164 LBS | HEART RATE: 67 BPM | BODY MASS INDEX: 33.06 KG/M2

## 2019-11-21 DIAGNOSIS — E03.8 HYPOTHYROIDISM DUE TO HASHIMOTO'S THYROIDITIS: ICD-10-CM

## 2019-11-21 DIAGNOSIS — E06.3 HYPOTHYROIDISM DUE TO HASHIMOTO'S THYROIDITIS: ICD-10-CM

## 2019-11-21 DIAGNOSIS — I49.3 PVC (PREMATURE VENTRICULAR CONTRACTION): ICD-10-CM

## 2019-11-21 DIAGNOSIS — I10 ESSENTIAL HYPERTENSION, BENIGN: Chronic | ICD-10-CM

## 2019-11-21 DIAGNOSIS — E78.00 PURE HYPERCHOLESTEROLEMIA: ICD-10-CM

## 2019-11-21 DIAGNOSIS — E11.9 TYPE 2 DIABETES MELLITUS WITHOUT COMPLICATION, WITHOUT LONG-TERM CURRENT USE OF INSULIN (HCC): ICD-10-CM

## 2019-11-21 DIAGNOSIS — I25.119 CORONARY ARTERY DISEASE INVOLVING NATIVE CORONARY ARTERY WITH ANGINA PECTORIS, UNSPECIFIED WHETHER NATIVE OR TRANSPLANTED HEART (HCC): Chronic | ICD-10-CM

## 2019-11-21 DIAGNOSIS — K21.9 GASTROESOPHAGEAL REFLUX DISEASE WITHOUT ESOPHAGITIS: ICD-10-CM

## 2019-11-21 DIAGNOSIS — R07.89 OTHER CHEST PAIN: ICD-10-CM

## 2019-11-21 DIAGNOSIS — E78.49 OTHER HYPERLIPIDEMIA: ICD-10-CM

## 2019-11-21 PROCEDURE — G8598 ASA/ANTIPLAT THER USED: HCPCS | Performed by: FAMILY MEDICINE

## 2019-11-21 PROCEDURE — 99214 OFFICE O/P EST MOD 30 MIN: CPT | Performed by: FAMILY MEDICINE

## 2019-11-21 PROCEDURE — 3017F COLORECTAL CA SCREEN DOC REV: CPT | Performed by: FAMILY MEDICINE

## 2019-11-21 PROCEDURE — G8482 FLU IMMUNIZE ORDER/ADMIN: HCPCS | Performed by: FAMILY MEDICINE

## 2019-11-21 PROCEDURE — 1090F PRES/ABSN URINE INCON ASSESS: CPT | Performed by: FAMILY MEDICINE

## 2019-11-21 PROCEDURE — G8417 CALC BMI ABV UP PARAM F/U: HCPCS | Performed by: FAMILY MEDICINE

## 2019-11-21 PROCEDURE — G0008 ADMIN INFLUENZA VIRUS VAC: HCPCS | Performed by: FAMILY MEDICINE

## 2019-11-21 PROCEDURE — 1123F ACP DISCUSS/DSCN MKR DOCD: CPT | Performed by: FAMILY MEDICINE

## 2019-11-21 PROCEDURE — 3044F HG A1C LEVEL LT 7.0%: CPT | Performed by: FAMILY MEDICINE

## 2019-11-21 PROCEDURE — 1036F TOBACCO NON-USER: CPT | Performed by: FAMILY MEDICINE

## 2019-11-21 PROCEDURE — 90653 IIV ADJUVANT VACCINE IM: CPT | Performed by: FAMILY MEDICINE

## 2019-11-21 PROCEDURE — 4040F PNEUMOC VAC/ADMIN/RCVD: CPT | Performed by: FAMILY MEDICINE

## 2019-11-21 PROCEDURE — 2022F DILAT RTA XM EVC RTNOPTHY: CPT | Performed by: FAMILY MEDICINE

## 2019-11-21 PROCEDURE — G8427 DOCREV CUR MEDS BY ELIG CLIN: HCPCS | Performed by: FAMILY MEDICINE

## 2019-11-21 PROCEDURE — G8400 PT W/DXA NO RESULTS DOC: HCPCS | Performed by: FAMILY MEDICINE

## 2019-11-21 RX ORDER — OMEPRAZOLE 40 MG/1
40 CAPSULE, DELAYED RELEASE ORAL EVERY MORNING
Qty: 90 CAPSULE | Refills: 3 | Status: SHIPPED
Start: 2019-11-21 | End: 2020-05-14 | Stop reason: SDUPTHER

## 2019-11-21 RX ORDER — OLMESARTAN MEDOXOMIL 20 MG/1
20 TABLET ORAL EVERY MORNING
Qty: 90 TABLET | Refills: 1 | Status: SHIPPED
Start: 2019-11-21 | End: 2020-05-14 | Stop reason: SDUPTHER

## 2019-11-21 RX ORDER — AMLODIPINE BESYLATE 5 MG/1
2.5 TABLET ORAL DAILY
Qty: 45 TABLET | Refills: 3 | Status: SHIPPED
Start: 2019-11-21 | End: 2020-05-14 | Stop reason: SDUPTHER

## 2019-11-21 RX ORDER — CLOBETASOL PROPIONATE 0.5 MG/G
1 CREAM TOPICAL 2 TIMES DAILY
Refills: 0 | COMMUNITY
Start: 2019-11-12 | End: 2020-02-13

## 2019-11-21 RX ORDER — LEVOTHYROXINE SODIUM 75 MCG
75 TABLET ORAL EVERY MORNING
Qty: 90 TABLET | Refills: 1 | Status: SHIPPED
Start: 2019-11-21 | End: 2020-05-14 | Stop reason: SDUPTHER

## 2019-11-21 RX ORDER — ROSUVASTATIN CALCIUM 10 MG/1
10 TABLET, COATED ORAL NIGHTLY
Qty: 90 TABLET | Refills: 3 | Status: SHIPPED
Start: 2019-11-21 | End: 2020-05-14 | Stop reason: SDUPTHER

## 2019-11-21 RX ORDER — METOPROLOL SUCCINATE 25 MG/1
12.5 TABLET, EXTENDED RELEASE ORAL 2 TIMES DAILY
Qty: 90 TABLET | Refills: 3 | Status: SHIPPED
Start: 2019-11-21 | End: 2020-05-14 | Stop reason: SDUPTHER

## 2019-11-21 ASSESSMENT — ENCOUNTER SYMPTOMS
EYES NEGATIVE: 1
WHEEZING: 1
CONSTIPATION: 0
DIARRHEA: 0
ABDOMINAL PAIN: 0
CHEST TIGHTNESS: 0
COUGH: 0
VOMITING: 0
BLOOD IN STOOL: 0

## 2019-12-30 DIAGNOSIS — E78.00 PURE HYPERCHOLESTEROLEMIA: ICD-10-CM

## 2019-12-30 DIAGNOSIS — R07.89 OTHER CHEST PAIN: ICD-10-CM

## 2019-12-30 DIAGNOSIS — I10 ESSENTIAL HYPERTENSION, BENIGN: Chronic | ICD-10-CM

## 2019-12-30 DIAGNOSIS — I49.3 PVC (PREMATURE VENTRICULAR CONTRACTION): ICD-10-CM

## 2019-12-30 DIAGNOSIS — I25.119 CORONARY ARTERY DISEASE INVOLVING NATIVE CORONARY ARTERY WITH ANGINA PECTORIS, UNSPECIFIED WHETHER NATIVE OR TRANSPLANTED HEART (HCC): Chronic | ICD-10-CM

## 2019-12-30 RX ORDER — NITROGLYCERIN 0.4 MG/1
0.4 TABLET SUBLINGUAL EVERY 5 MIN PRN
Qty: 3 TABLET | Refills: 4 | Status: SHIPPED
Start: 2019-12-30 | End: 2020-04-16 | Stop reason: SDUPTHER

## 2020-02-13 ENCOUNTER — OFFICE VISIT (OUTPATIENT)
Dept: FAMILY MEDICINE CLINIC | Age: 75
End: 2020-02-13
Payer: MEDICARE

## 2020-02-13 VITALS
RESPIRATION RATE: 18 BRPM | WEIGHT: 163 LBS | DIASTOLIC BLOOD PRESSURE: 88 MMHG | OXYGEN SATURATION: 96 % | SYSTOLIC BLOOD PRESSURE: 138 MMHG | BODY MASS INDEX: 32.86 KG/M2 | HEIGHT: 59 IN | TEMPERATURE: 97.3 F | HEART RATE: 83 BPM

## 2020-02-13 PROCEDURE — G8427 DOCREV CUR MEDS BY ELIG CLIN: HCPCS | Performed by: INTERNAL MEDICINE

## 2020-02-13 PROCEDURE — 1036F TOBACCO NON-USER: CPT | Performed by: INTERNAL MEDICINE

## 2020-02-13 PROCEDURE — G8400 PT W/DXA NO RESULTS DOC: HCPCS | Performed by: INTERNAL MEDICINE

## 2020-02-13 PROCEDURE — 1123F ACP DISCUSS/DSCN MKR DOCD: CPT | Performed by: INTERNAL MEDICINE

## 2020-02-13 PROCEDURE — G8417 CALC BMI ABV UP PARAM F/U: HCPCS | Performed by: INTERNAL MEDICINE

## 2020-02-13 PROCEDURE — G8482 FLU IMMUNIZE ORDER/ADMIN: HCPCS | Performed by: INTERNAL MEDICINE

## 2020-02-13 PROCEDURE — 99213 OFFICE O/P EST LOW 20 MIN: CPT | Performed by: INTERNAL MEDICINE

## 2020-02-13 PROCEDURE — 4040F PNEUMOC VAC/ADMIN/RCVD: CPT | Performed by: INTERNAL MEDICINE

## 2020-02-13 PROCEDURE — 3017F COLORECTAL CA SCREEN DOC REV: CPT | Performed by: INTERNAL MEDICINE

## 2020-02-13 PROCEDURE — 1090F PRES/ABSN URINE INCON ASSESS: CPT | Performed by: INTERNAL MEDICINE

## 2020-02-13 NOTE — PROGRESS NOTES
2020   Express care    Camila [facility] (:  1945) is a 75 y.o. female, presents to [facility] care with pain in the left neck that comes and goes very quickly over the last 2 days. She denies any injury to the neck. No fever or chills. She says it is a sharp pain very severe that comes and goes without any warning. No nausea vomiting or diarrhea. She had questions whether or not the Glucophage would do this because apparently she read this. I have explained to the patient that the Glucophage will not cause an isolated musculoskeletal pain. She also has a carotid stent and I told her she would not feel this. Chief Complaint   Patient presents with    Other     Pt states she has had a squeezing sensation on left side of neck on and off for the last 3 days. Review of Systems    Prior to Visit Medications    Medication Sig Taking?  Authorizing Provider   amLODIPine (NORVASC) 5 MG tablet Take 0.5 tablets by mouth daily Yes Lida Tavarez MD   metFORMIN (GLUCOPHAGE) 500 MG tablet Take 1 tablet by mouth Daily with supper Yes Lida Tavarez MD   metoprolol succinate (TOPROL XL) 25 MG extended release tablet Take 0.5 tablets by mouth 2 times daily 12.5mg BID Yes Lida Tavarez MD   olmesartan (BENICAR) 20 MG tablet Take 1 tablet by mouth every morning Yes Lida Tavarez MD   omeprazole (PRILOSEC) 40 MG delayed release capsule Take 1 capsule by mouth every morning Yes Lida Tavarez MD   rosuvastatin (CRESTOR) 10 MG tablet Take 1 tablet by mouth nightly Yes Lida Tavarez MD   SYNTHROID 75 MCG tablet Take 1 tablet by mouth every morning Yes Lida Tavarez MD   FREESTYLE LITE strip  Yes Historical Provider, MD   vitamin E 400 UNIT capsule Take 400 Units by mouth every morning Yes Historical Provider, MD   vitamin C (ASCORBIC ACID) 500 MG tablet Take 1,000 mg by mouth daily  Yes Historical Provider, MD   aspirin 81 MG chewable tablet Take 81 mg by mouth every morning  Yes Historical Provider, MD nitroGLYCERIN (NITROSTAT) 0.4 MG SL tablet PLACE 1 TABLET UNDER THE TONGUE EVERY 5 MINUTES AS NEEDED FOR CHEST PAIN  Patient not taking: Reported on 2020  Marquez Arriaga MD      Allergies   Allergen Reactions    Epinephrine      \"makes me spacey\"    Other Nausea Only     unknown antibiotic       Past Medical History:   Diagnosis Date    CAD (coronary artery disease)     follows with Dr. Narendra Kc yearly (next appt3)    Diabetes mellitus ([facility] [address]     Essential hypertension, benign 3/26/2014    History of colon polyps     Hyperinsulinism     Hyperlipidemia     Hypertension     Hypothyroidism      Past Surgical History:   Procedure Laterality Date    APPENDECTOMY      CARDIAC CATHETERIZATION  2011    CARDIAC CATHETERIZATION  2015    Dr. Peterson Stains      stent placement    CARPAL TUNNEL RELEASE Bilateral     CARPAL TUNNEL RELEASE Left 2013    COLONOSCOPY      COLONOSCOPY  2012    CORONARY ANGIOPLASTY      DIAGNOSTIC CARDIAC CATH LAB PROCEDURE      ENDOSCOPY, COLON, DIAGNOSTIC      EYE SURGERY Right     cataract    HYSTERECTOMY      HYSTERECTOMY, TOTAL ABDOMINAL      POLYPECTOMY      PTCA      WITH STENT 2 TIMES     UPPER GASTROINTESTINAL ENDOSCOPY        Social History     Tobacco Use    Smoking status: Former Smoker     Packs/day: 0.50     Years: 15.00     Pack years: 7.50     Types: Cigarettes     Start date: 1959     Last attempt to quit: 1974     Years since quittin.2    Smokeless tobacco: Never Used   Substance Use Topics    Alcohol use: No        Vitals:    20 0924   BP: 138/88   Pulse: 83   Resp: 18   Temp: 97.3 °F (36.3 °C)   TempSrc: Temporal   SpO2: 96%   Weight: 163 lb (73.9 kg)   Height: 4' 11\" (1.499 m)     Estimated body mass index is 32.92 kg/m² as calculated from the following:    Height as of this encounter: 4' 11\" (1.499 m). Weight as of this encounter: 163 lb (73.9 kg).     Physical today.  She may also try hot packs to the area 15 minutes on and 15 minutes off. Saws therapy would also benefit her. An electronic signature was used to authenticate this note.     --Reid Oliver,  on 2/13/2020 at 9:49 AM

## 2020-04-16 RX ORDER — NITROGLYCERIN 0.4 MG/1
0.4 TABLET SUBLINGUAL EVERY 5 MIN PRN
Qty: 25 TABLET | Refills: 3 | Status: SHIPPED | OUTPATIENT
Start: 2020-04-16

## 2020-05-14 ENCOUNTER — OFFICE VISIT (OUTPATIENT)
Dept: FAMILY MEDICINE CLINIC | Age: 75
End: 2020-05-14
Payer: MEDICARE

## 2020-05-14 VITALS
DIASTOLIC BLOOD PRESSURE: 80 MMHG | BODY MASS INDEX: 32.92 KG/M2 | TEMPERATURE: 97.2 F | HEART RATE: 83 BPM | OXYGEN SATURATION: 96 % | SYSTOLIC BLOOD PRESSURE: 126 MMHG | WEIGHT: 163 LBS

## 2020-05-14 PROCEDURE — 1123F ACP DISCUSS/DSCN MKR DOCD: CPT | Performed by: FAMILY MEDICINE

## 2020-05-14 PROCEDURE — 96372 THER/PROPH/DIAG INJ SC/IM: CPT | Performed by: FAMILY MEDICINE

## 2020-05-14 PROCEDURE — 4040F PNEUMOC VAC/ADMIN/RCVD: CPT | Performed by: FAMILY MEDICINE

## 2020-05-14 PROCEDURE — G8427 DOCREV CUR MEDS BY ELIG CLIN: HCPCS | Performed by: FAMILY MEDICINE

## 2020-05-14 PROCEDURE — 1090F PRES/ABSN URINE INCON ASSESS: CPT | Performed by: FAMILY MEDICINE

## 2020-05-14 PROCEDURE — G8400 PT W/DXA NO RESULTS DOC: HCPCS | Performed by: FAMILY MEDICINE

## 2020-05-14 PROCEDURE — 99214 OFFICE O/P EST MOD 30 MIN: CPT | Performed by: FAMILY MEDICINE

## 2020-05-14 PROCEDURE — G8510 SCR DEP NEG, NO PLAN REQD: HCPCS | Performed by: FAMILY MEDICINE

## 2020-05-14 PROCEDURE — 1036F TOBACCO NON-USER: CPT | Performed by: FAMILY MEDICINE

## 2020-05-14 PROCEDURE — G8417 CALC BMI ABV UP PARAM F/U: HCPCS | Performed by: FAMILY MEDICINE

## 2020-05-14 PROCEDURE — 3017F COLORECTAL CA SCREEN DOC REV: CPT | Performed by: FAMILY MEDICINE

## 2020-05-14 RX ORDER — METFORMIN HYDROCHLORIDE 500 MG/1
500 TABLET, EXTENDED RELEASE ORAL
COMMUNITY
End: 2020-08-17

## 2020-05-14 RX ORDER — LEVOTHYROXINE SODIUM 75 MCG
75 TABLET ORAL EVERY MORNING
Qty: 90 TABLET | Refills: 1 | Status: SHIPPED
Start: 2020-05-14 | End: 2020-11-19 | Stop reason: SDUPTHER

## 2020-05-14 RX ORDER — METOPROLOL SUCCINATE 25 MG/1
12.5 TABLET, EXTENDED RELEASE ORAL 2 TIMES DAILY
Qty: 90 TABLET | Refills: 3 | Status: SHIPPED
Start: 2020-05-14 | End: 2020-11-19 | Stop reason: SDUPTHER

## 2020-05-14 RX ORDER — CYANOCOBALAMIN 1000 UG/ML
1000 INJECTION INTRAMUSCULAR; SUBCUTANEOUS ONCE
Status: COMPLETED | OUTPATIENT
Start: 2020-05-14 | End: 2020-05-14

## 2020-05-14 RX ORDER — ROSUVASTATIN CALCIUM 10 MG/1
10 TABLET, COATED ORAL NIGHTLY
Qty: 90 TABLET | Refills: 3 | Status: SHIPPED
Start: 2020-05-14 | End: 2020-11-19 | Stop reason: SDUPTHER

## 2020-05-14 RX ORDER — AMLODIPINE BESYLATE 5 MG/1
2.5 TABLET ORAL DAILY
Qty: 45 TABLET | Refills: 3 | Status: SHIPPED
Start: 2020-05-14 | End: 2020-11-19 | Stop reason: SDUPTHER

## 2020-05-14 RX ORDER — OMEPRAZOLE 40 MG/1
40 CAPSULE, DELAYED RELEASE ORAL EVERY MORNING
Qty: 90 CAPSULE | Refills: 3 | Status: SHIPPED
Start: 2020-05-14 | End: 2020-11-19 | Stop reason: SDUPTHER

## 2020-05-14 RX ORDER — OLMESARTAN MEDOXOMIL 20 MG/1
20 TABLET ORAL EVERY MORNING
Qty: 90 TABLET | Refills: 3 | Status: SHIPPED
Start: 2020-05-14 | End: 2020-11-19 | Stop reason: SDUPTHER

## 2020-05-14 RX ADMIN — CYANOCOBALAMIN 1000 MCG: 1000 INJECTION INTRAMUSCULAR; SUBCUTANEOUS at 12:09

## 2020-05-14 ASSESSMENT — PATIENT HEALTH QUESTIONNAIRE - PHQ9
SUM OF ALL RESPONSES TO PHQ9 QUESTIONS 1 & 2: 0
1. LITTLE INTEREST OR PLEASURE IN DOING THINGS: 0
2. FEELING DOWN, DEPRESSED OR HOPELESS: 0
SUM OF ALL RESPONSES TO PHQ QUESTIONS 1-9: 0
SUM OF ALL RESPONSES TO PHQ QUESTIONS 1-9: 0

## 2020-05-14 NOTE — PROGRESS NOTES
OFFICE NOTE    20  Name: Jocelyn Hadley  :1945   Sex:female   Age:74 y.o. SUBJECTIVE  Chief Complaint   Patient presents with    Hypertension    Hyperlipidemia    Hypothyroidism    Advice Only     Trever wanting to know if metformin depleted b12    Back Pain     right lower       HPI   Comes in for checkup and refills. Cardiology note reviewed.  We see her for hypertension, hyperlipidemia and hypothryroidism    Review of Systems         Current Outpatient Medications:     metFORMIN (GLUCOPHAGE-XR) 500 MG extended release tablet, Take 500 mg by mouth daily (with breakfast), Disp: , Rfl:     SYNTHROID 75 MCG tablet, Take 1 tablet by mouth every morning, Disp: 90 tablet, Rfl: 1    rosuvastatin (CRESTOR) 10 MG tablet, Take 1 tablet by mouth nightly, Disp: 90 tablet, Rfl: 3    omeprazole (PRILOSEC) 40 MG delayed release capsule, Take 1 capsule by mouth every morning, Disp: 90 capsule, Rfl: 3    olmesartan (BENICAR) 20 MG tablet, Take 1 tablet by mouth every morning, Disp: 90 tablet, Rfl: 3    metoprolol succinate (TOPROL XL) 25 MG extended release tablet, Take 0.5 tablets by mouth 2 times daily 12.5mg BID, Disp: 90 tablet, Rfl: 3    amLODIPine (NORVASC) 5 MG tablet, Take 0.5 tablets by mouth daily, Disp: 45 tablet, Rfl: 3    nitroGLYCERIN (NITROSTAT) 0.4 MG SL tablet, Place 1 tablet under the tongue every 5 minutes as needed for Chest pain, Disp: 25 tablet, Rfl: 3    FREESTYLE LITE strip, , Disp: , Rfl:     vitamin E 400 UNIT capsule, Take 400 Units by mouth every morning, Disp: , Rfl:     vitamin C (ASCORBIC ACID) 500 MG tablet, Take 1,000 mg by mouth daily , Disp: , Rfl:     aspirin 81 MG chewable tablet, Take 81 mg by mouth every morning , Disp: , Rfl:   Allergies   Allergen Reactions    Epinephrine      \"makes me spacey\"    Other Nausea Only     unknown antibiotic       Past Medical History:   Diagnosis Date    CAD (coronary artery disease)     follows with Dr. Mercedes Pena yearly tablets by mouth 2 times daily 12.5mg BID  -     amLODIPine (NORVASC) 5 MG tablet; Take 0.5 tablets by mouth daily          No follow-ups on file.     Electronically signed by João Smith MD on 5/14/20 at 11:15 AM EDT

## 2020-05-22 ENCOUNTER — NURSE TRIAGE (OUTPATIENT)
Dept: OTHER | Facility: CLINIC | Age: 75
End: 2020-05-22

## 2020-05-22 ENCOUNTER — TELEPHONE (OUTPATIENT)
Dept: ADMINISTRATIVE | Age: 75
End: 2020-05-22

## 2020-05-26 ENCOUNTER — HOSPITAL ENCOUNTER (OUTPATIENT)
Age: 75
Discharge: HOME | End: 2020-05-28
Payer: MEDICARE

## 2020-05-26 ENCOUNTER — OFFICE VISIT (OUTPATIENT)
Dept: FAMILY MEDICINE CLINIC | Age: 75
End: 2020-05-26
Payer: MEDICARE

## 2020-05-26 VITALS
OXYGEN SATURATION: 97 % | TEMPERATURE: 97.8 F | WEIGHT: 162.4 LBS | HEART RATE: 111 BPM | BODY MASS INDEX: 32.8 KG/M2 | SYSTOLIC BLOOD PRESSURE: 138 MMHG | DIASTOLIC BLOOD PRESSURE: 72 MMHG

## 2020-05-26 LAB
ALBUMIN SERPL-MCNC: 4.4 G/DL (ref 3.5–5.2)
ALP BLD-CCNC: 90 U/L (ref 35–104)
ALT SERPL-CCNC: 30 U/L (ref 0–32)
AST SERPL-CCNC: 31 U/L (ref 0–31)
BILIRUB SERPL-MCNC: 0.6 MG/DL (ref 0–1.2)
BILIRUBIN DIRECT: <0.2 MG/DL (ref 0–0.3)
BILIRUBIN, INDIRECT: NORMAL MG/DL (ref 0–1)
TOTAL PROTEIN: 7.8 G/DL (ref 6.4–8.3)

## 2020-05-26 PROCEDURE — G8427 DOCREV CUR MEDS BY ELIG CLIN: HCPCS | Performed by: FAMILY MEDICINE

## 2020-05-26 PROCEDURE — 99213 OFFICE O/P EST LOW 20 MIN: CPT | Performed by: FAMILY MEDICINE

## 2020-05-26 PROCEDURE — 80076 HEPATIC FUNCTION PANEL: CPT

## 2020-05-26 PROCEDURE — G8417 CALC BMI ABV UP PARAM F/U: HCPCS | Performed by: FAMILY MEDICINE

## 2020-05-26 PROCEDURE — 36415 COLL VENOUS BLD VENIPUNCTURE: CPT

## 2020-05-26 PROCEDURE — 1036F TOBACCO NON-USER: CPT | Performed by: FAMILY MEDICINE

## 2020-05-26 PROCEDURE — 3017F COLORECTAL CA SCREEN DOC REV: CPT | Performed by: FAMILY MEDICINE

## 2020-05-26 PROCEDURE — 4040F PNEUMOC VAC/ADMIN/RCVD: CPT | Performed by: FAMILY MEDICINE

## 2020-05-26 PROCEDURE — 1090F PRES/ABSN URINE INCON ASSESS: CPT | Performed by: FAMILY MEDICINE

## 2020-05-26 PROCEDURE — G8400 PT W/DXA NO RESULTS DOC: HCPCS | Performed by: FAMILY MEDICINE

## 2020-05-26 PROCEDURE — 1123F ACP DISCUSS/DSCN MKR DOCD: CPT | Performed by: FAMILY MEDICINE

## 2020-05-26 RX ORDER — DICYCLOMINE HYDROCHLORIDE 10 MG/1
10 CAPSULE ORAL 4 TIMES DAILY
Qty: 40 CAPSULE | Refills: 1 | Status: SHIPPED
Start: 2020-05-26 | End: 2020-08-18

## 2020-05-26 NOTE — PROGRESS NOTES
OFFICE NOTE    20  Name: Soha Levin  :1945   Sex:female   Age:74 y.o. SUBJECTIVE  Chief Complaint   Patient presents with    Abdominal Pain     RUQ, dull ache, comes and goes, onset Thursday       HPI Pain started after breakfast at [facility] which consisted of eggs sausage, biscuits and gravy. Feels deep like it is over her liver. Feels bloated and gassy but no diarrhea, rectal bleeding or vomiting.  Has some GERD symptoms for which she takes Omeprazol  Review of Systems  No fever, Jaundice, weight loss      Current Outpatient Medications:     dicyclomine (BENTYL) 10 MG capsule, Take 1 capsule by mouth 4 times daily Prn abdominal pain, Disp: 40 capsule, Rfl: 1    metFORMIN (GLUCOPHAGE-XR) 500 MG extended release tablet, Take 500 mg by mouth daily (with breakfast), Disp: , Rfl:     SYNTHROID 75 MCG tablet, Take 1 tablet by mouth every morning, Disp: 90 tablet, Rfl: 1    rosuvastatin (CRESTOR) 10 MG tablet, Take 1 tablet by mouth nightly, Disp: 90 tablet, Rfl: 3    omeprazole (PRILOSEC) 40 MG delayed release capsule, Take 1 capsule by mouth every morning, Disp: 90 capsule, Rfl: 3    olmesartan (BENICAR) 20 MG tablet, Take 1 tablet by mouth every morning, Disp: 90 tablet, Rfl: 3    metoprolol succinate (TOPROL XL) 25 MG extended release tablet, Take 0.5 tablets by mouth 2 times daily 12.5mg BID, Disp: 90 tablet, Rfl: 3    amLODIPine (NORVASC) 5 MG tablet, Take 0.5 tablets by mouth daily, Disp: 45 tablet, Rfl: 3    nitroGLYCERIN (NITROSTAT) 0.4 MG SL tablet, Place 1 tablet under the tongue every 5 minutes as needed for Chest pain, Disp: 25 tablet, Rfl: 3    FREESTYLE LITE strip, , Disp: , Rfl:     vitamin E 400 UNIT capsule, Take 400 Units by mouth every morning, Disp: , Rfl:     vitamin C (ASCORBIC ACID) 500 MG tablet, Take 1,000 mg by mouth daily , Disp: , Rfl:     aspirin 81 MG chewable tablet, Take 81 mg by mouth every morning , Disp: , Rfl:   Allergies   Allergen Reactions

## 2020-05-27 ENCOUNTER — TELEPHONE (OUTPATIENT)
Dept: FAMILY MEDICINE CLINIC | Age: 75
End: 2020-05-27

## 2020-05-29 ENCOUNTER — HOSPITAL ENCOUNTER (OUTPATIENT)
Dept: NUCLEAR MEDICINE | Age: 75
Discharge: HOME | End: 2020-05-29
Payer: MEDICARE

## 2020-05-29 VITALS — WEIGHT: 160 LBS | BODY MASS INDEX: 32.32 KG/M2

## 2020-05-29 PROCEDURE — 78227 HEPATOBIL SYST IMAGE W/DRUG: CPT

## 2020-05-29 PROCEDURE — A9537 TC99M MEBROFENIN: HCPCS | Performed by: RADIOLOGY

## 2020-05-29 PROCEDURE — 3430000000 HC RX DIAGNOSTIC RADIOPHARMACEUTICAL: Performed by: RADIOLOGY

## 2020-05-29 RX ADMIN — Medication 6 MILLICURIE: at 07:28

## 2020-08-18 ENCOUNTER — OFFICE VISIT (OUTPATIENT)
Dept: FAMILY MEDICINE CLINIC | Age: 75
End: 2020-08-18
Payer: MEDICARE

## 2020-08-18 VITALS
HEIGHT: 59 IN | WEIGHT: 164 LBS | BODY MASS INDEX: 33.06 KG/M2 | DIASTOLIC BLOOD PRESSURE: 76 MMHG | HEART RATE: 88 BPM | TEMPERATURE: 97.9 F | SYSTOLIC BLOOD PRESSURE: 122 MMHG | OXYGEN SATURATION: 96 %

## 2020-08-18 PROBLEM — I25.119 CORONARY ARTERY DISEASE INVOLVING NATIVE CORONARY ARTERY WITH ANGINA PECTORIS (HCC): Status: ACTIVE | Noted: 2020-08-18

## 2020-08-18 PROCEDURE — 1036F TOBACCO NON-USER: CPT | Performed by: FAMILY MEDICINE

## 2020-08-18 PROCEDURE — G8427 DOCREV CUR MEDS BY ELIG CLIN: HCPCS | Performed by: FAMILY MEDICINE

## 2020-08-18 PROCEDURE — 1090F PRES/ABSN URINE INCON ASSESS: CPT | Performed by: FAMILY MEDICINE

## 2020-08-18 PROCEDURE — 3017F COLORECTAL CA SCREEN DOC REV: CPT | Performed by: FAMILY MEDICINE

## 2020-08-18 PROCEDURE — 4040F PNEUMOC VAC/ADMIN/RCVD: CPT | Performed by: FAMILY MEDICINE

## 2020-08-18 PROCEDURE — 99214 OFFICE O/P EST MOD 30 MIN: CPT | Performed by: FAMILY MEDICINE

## 2020-08-18 PROCEDURE — G8417 CALC BMI ABV UP PARAM F/U: HCPCS | Performed by: FAMILY MEDICINE

## 2020-08-18 PROCEDURE — 2022F DILAT RTA XM EVC RTNOPTHY: CPT | Performed by: FAMILY MEDICINE

## 2020-08-18 PROCEDURE — 1123F ACP DISCUSS/DSCN MKR DOCD: CPT | Performed by: FAMILY MEDICINE

## 2020-08-18 PROCEDURE — 3046F HEMOGLOBIN A1C LEVEL >9.0%: CPT | Performed by: FAMILY MEDICINE

## 2020-08-18 PROCEDURE — G8400 PT W/DXA NO RESULTS DOC: HCPCS | Performed by: FAMILY MEDICINE

## 2020-08-18 RX ORDER — CLOTRIMAZOLE AND BETAMETHASONE DIPROPIONATE 10; .64 MG/G; MG/G
CREAM TOPICAL
Qty: 1 TUBE | Refills: 1 | Status: SHIPPED
Start: 2020-08-18 | End: 2020-09-01 | Stop reason: CLARIF

## 2020-08-18 RX ORDER — MICONAZOLE NITRATE 20.6 MG/G
POWDER TOPICAL
Qty: 45 G | Refills: 1 | Status: SHIPPED
Start: 2020-08-18 | End: 2020-09-01 | Stop reason: CLARIF

## 2020-08-18 ASSESSMENT — ENCOUNTER SYMPTOMS
COUGH: 0
VOMITING: 0
CONSTIPATION: 0
CHEST TIGHTNESS: 0
DIARRHEA: 0
BLOOD IN STOOL: 0
EYES NEGATIVE: 1
WHEEZING: 0
ABDOMINAL PAIN: 0

## 2020-08-18 NOTE — PROGRESS NOTES
OFFICE NOTE    20  Name: Xenia Wylie  :1945   Sex:female   Age:74 y.o. SUBJECTIVE  Chief Complaint   Patient presents with   Jc Trejo       HPI Was stung by yellow jacket she believes 2 weeks ago and about a week later a rash appeared on RLQ some distance for sting. Burns more than itches    Review of Systems   Constitutional: Negative for appetite change, fever and unexpected weight change. HENT: Negative for congestion, ear pain and postnasal drip. Eyes: Negative. Respiratory: Negative for cough, chest tightness and wheezing. Cardiovascular: Negative for chest pain and palpitations. Gastrointestinal: Negative for abdominal pain, blood in stool, constipation, diarrhea and vomiting. Endocrine: Negative for cold intolerance, polydipsia and polyuria. Genitourinary: Negative for dysuria and hematuria. Musculoskeletal: Negative for arthralgias, gait problem and joint swelling. Skin: Positive for rash. Negative for wound. Allergic/Immunologic: Negative for environmental allergies and food allergies. Neurological: Negative for dizziness, tremors, weakness and headaches. Hematological: Negative for adenopathy. Does not bruise/bleed easily. Psychiatric/Behavioral: Negative for behavioral problems, confusion, dysphoric mood and sleep disturbance. no fever. Lumps, night sweats. Has apt in November with us,        Current Outpatient Medications:     vitamin D (CHOLECALCIFEROL) 125 MCG (5000 UT) CAPS capsule, Take 5,000 Units by mouth daily, Disp: , Rfl:     clotrimazole-betamethasone (LOTRISONE) 1-0.05 % cream, Apply topically 2 times daily. , Disp: 1 Tube, Rfl: 1    miconazole (ZEASORB-AF) 2 % powder, Apply topically 2 times daily. , Disp: 45 g, Rfl: 1    metFORMIN (GLUCOPHAGE) 500 MG tablet, , Disp: , Rfl:     SYNTHROID 75 MCG tablet, Take 1 tablet by mouth every morning, Disp: 90 tablet, Rfl: 1    rosuvastatin (CRESTOR) 10 MG tablet, Take 1 tablet by mouth nightly, Disp: 90 tablet, Rfl: 3    omeprazole (PRILOSEC) 40 MG delayed release capsule, Take 1 capsule by mouth every morning, Disp: 90 capsule, Rfl: 3    olmesartan (BENICAR) 20 MG tablet, Take 1 tablet by mouth every morning, Disp: 90 tablet, Rfl: 3    metoprolol succinate (TOPROL XL) 25 MG extended release tablet, Take 0.5 tablets by mouth 2 times daily 12.5mg BID, Disp: 90 tablet, Rfl: 3    amLODIPine (NORVASC) 5 MG tablet, Take 0.5 tablets by mouth daily, Disp: 45 tablet, Rfl: 3    nitroGLYCERIN (NITROSTAT) 0.4 MG SL tablet, Place 1 tablet under the tongue every 5 minutes as needed for Chest pain, Disp: 25 tablet, Rfl: 3    FREESTYLE LITE strip, , Disp: , Rfl:     vitamin E 400 UNIT capsule, Take 400 Units by mouth every morning, Disp: , Rfl:     vitamin C (ASCORBIC ACID) 500 MG tablet, Take 1,000 mg by mouth daily , Disp: , Rfl:     aspirin 81 MG chewable tablet, Take 81 mg by mouth every morning , Disp: , Rfl:   Allergies   Allergen Reactions    Epinephrine      \"makes me spacey\"    Other Nausea Only     unknown antibiotic       Past Medical History:   Diagnosis Date    CAD (coronary artery disease)     follows with  [facility] [employer] [facility] yearly (next appt3/22/18)    Diabetes mellitus ([facility] [address]     Essential hypertension, benign 3/26/2014    History of colon polyps     Hyperinsulinism     Hyperlipidemia     Hypertension     Hypothyroidism      Past Surgical History:   Procedure Laterality Date    APPENDECTOMY      CARDIAC CATHETERIZATION  5/2011    CARDIAC CATHETERIZATION  1-    Dr. Silva Andrae  4/11    stent placement    CARPAL TUNNEL RELEASE Bilateral     CARPAL TUNNEL RELEASE Left 11/2013    COLONOSCOPY      COLONOSCOPY  07/02/2012    CORONARY ANGIOPLASTY      DIAGNOSTIC CARDIAC CATH LAB PROCEDURE      ENDOSCOPY, COLON, DIAGNOSTIC      EYE SURGERY Right     cataract    HYSTERECTOMY      HYSTERECTOMY, TOTAL ABDOMINAL      POLYPECTOMY  2012    PTCA  2011    WITH STENT 2 TIMES     UPPER GASTROINTESTINAL ENDOSCOPY  2012     Family History   Problem Relation Age of Onset    Heart Attack Mother     Heart Attack Father     Heart Surgery Brother     Heart Attack Sister      Social History     Tobacco History     Smoking Status  Former Smoker Smoking Start Date  11/12/1959 Quit date  11/12/1974 Smoking Frequency  0.5 packs/day for 15 years (7.5 pk yrs)    Smoking Tobacco Type  Cigarettes    Smokeless Tobacco Use  Never Used          Alcohol History     Alcohol Use Status  No          Drug Use     Drug Use Status  No          Sexual Activity     Sexually Active  Not Currently Partners  Male                OBJECTIVE  Vitals:    08/18/20 0931   BP: 122/76   Site: Right Upper Arm   Pulse: 88   Temp: 97.9 °F (36.6 °C)   SpO2: 96%   Weight: 164 lb (74.4 kg)   Height: 4' 11\" (1.499 m)        Body mass index is 33.12 kg/m². Orders Placed This Encounter   Procedures    RD DIGITAL SCREEN W OR WO CAD BILATERAL     Standing Status:   Future     Standing Expiration Date:   10/18/2021    CBC Auto Differential     Standing Status:   Future     Standing Expiration Date:   8/17/2021    Comprehensive Metabolic Panel     Standing Status:   Future     Standing Expiration Date:   8/17/2021    Lipid Panel     Standing Status:   Future     Standing Expiration Date:   8/17/2021     Order Specific Question:   Is Patient Fasting?/# of Hours     Answer:   8    TSH without Reflex     Standing Status:   Future     Standing Expiration Date:   8/17/2021    Urinalysis     Standing Status:   Future     Standing Expiration Date:   8/17/2021     Order Specific Question:   SPECIFY(EX-CATH,MIDSTREAM,CYSTO,ETC)?      Answer:   midstream    Hemoglobin A1C     Standing Status:   Future     Standing Expiration Date:   8/17/2021    Microalbumin / Creatinine Urine Ratio     Standing Status:   Future     Standing Expiration Date:   8/17/2021    T4, Free     Standing Status:   Future     Standing Expiration Date:   8/17/2021        EXAM   Physical Exam  Vitals signs and nursing note reviewed. Constitutional:       Appearance: Normal appearance. Comments: obese   HENT:      Mouth/Throat:      Pharynx: Oropharynx is clear. Cardiovascular:      Rate and Rhythm: Normal rate and regular rhythm. Pulmonary:      Effort: Pulmonary effort is normal.      Breath sounds: Normal breath sounds. Lymphadenopathy:      Cervical: No cervical adenopathy. Skin:     Findings: Rash present. Comments: Area of intertrigo under abdominal pannus right side   Neurological:      General: No focal deficit present. Mental Status: She is alert and oriented to person, place, and time. Psychiatric:         Mood and Affect: Mood normal.           Camila was seen today for rash and insect bite. Diagnoses and all orders for this visit:    Hypothyroidism due to Hashimoto's thyroiditis  -     TSH without Reflex; Future  -     T4, Free; Future  Will have labs done before next apt    Other hyperlipidemia  -     Lipid Panel; Future  Same as above  On statin    Essential hypertension, benign  -     CBC Auto Differential; Future  -     Comprehensive Metabolic Panel; Future  -     Urinalysis; Future  Normotensive no changes made    Type 2 diabetes mellitus without complication, without long-term current use of insulin (HCC)  -     Hemoglobin A1C; Future  -     Microalbumin / Creatinine Urine Ratio; Future  Historically has been good. Takes metformin    Encounter for screening mammogram for breast cancer  -     RD DIGITAL SCREEN W OR WO CAD BILATERAL; Future    Coronary artery disease involving native coronary artery with angina pectoris, unspecified whether native or transplanted heart [facility])  Sees Cardiology regularly not ischemic symptoms    Other orders  -     clotrimazole-betamethasone (LOTRISONE) 1-0.05 % cream; Apply topically 2 times daily. -     miconazole (ZEASORB-AF) 2 % powder; Apply topically 2 times daily.   First cream then the powder          Return keep apt in November.     Electronically signed by Denys Woodson MD on 8/18/20 at 9:52 AM EDT

## 2020-08-24 ENCOUNTER — HOSPITAL ENCOUNTER (OUTPATIENT)
Age: 75
Discharge: HOME | End: 2020-08-26
Payer: MEDICARE

## 2020-08-24 LAB
ALBUMIN SERPL-MCNC: 4.2 G/DL (ref 3.5–5.2)
ALP BLD-CCNC: 91 U/L (ref 35–104)
ALT SERPL-CCNC: 32 U/L (ref 0–32)
ANION GAP SERPL CALCULATED.3IONS-SCNC: 14 MMOL/L (ref 7–16)
AST SERPL-CCNC: 31 U/L (ref 0–31)
BACTERIA: ABNORMAL /HPF
BASOPHILS ABSOLUTE: 0.04 E9/L (ref 0–0.2)
BASOPHILS RELATIVE PERCENT: 0.8 % (ref 0–2)
BILIRUB SERPL-MCNC: 0.6 MG/DL (ref 0–1.2)
BILIRUBIN URINE: NEGATIVE
BLOOD, URINE: ABNORMAL
BUN BLDV-MCNC: 18 MG/DL (ref 8–23)
CALCIUM SERPL-MCNC: 10.6 MG/DL (ref 8.6–10.2)
CHLORIDE BLD-SCNC: 103 MMOL/L (ref 98–107)
CHOLESTEROL, TOTAL: 136 MG/DL (ref 0–199)
CLARITY: CLEAR
CO2: 24 MMOL/L (ref 22–29)
COLOR: YELLOW
CREAT SERPL-MCNC: 0.9 MG/DL (ref 0.5–1)
CREATININE URINE: 83 MG/DL (ref 29–226)
EOSINOPHILS ABSOLUTE: 0.18 E9/L (ref 0.05–0.5)
EOSINOPHILS RELATIVE PERCENT: 3.6 % (ref 0–6)
EPITHELIAL CELLS, UA: ABNORMAL /HPF
GFR AFRICAN AMERICAN: >60
GFR NON-AFRICAN AMERICAN: >60 ML/MIN/1.73
GLUCOSE BLD-MCNC: 129 MG/DL (ref 74–99)
GLUCOSE URINE: NEGATIVE MG/DL
HBA1C MFR BLD: 6.1 % (ref 4–5.6)
HCT VFR BLD CALC: 41.4 % (ref 34–48)
HDLC SERPL-MCNC: 43 MG/DL
HEMOGLOBIN: 13.3 G/DL (ref 11.5–15.5)
IMMATURE GRANULOCYTES #: 0.02 E9/L
IMMATURE GRANULOCYTES %: 0.4 % (ref 0–5)
KETONES, URINE: NEGATIVE MG/DL
LDL CHOLESTEROL CALCULATED: 65 MG/DL (ref 0–99)
LEUKOCYTE ESTERASE, URINE: ABNORMAL
LYMPHOCYTES ABSOLUTE: 1.66 E9/L (ref 1.5–4)
LYMPHOCYTES RELATIVE PERCENT: 33.1 % (ref 20–42)
MCH RBC QN AUTO: 31.7 PG (ref 26–35)
MCHC RBC AUTO-ENTMCNC: 32.1 % (ref 32–34.5)
MCV RBC AUTO: 98.6 FL (ref 80–99.9)
MICROALBUMIN UR-MCNC: <12 MG/L
MICROALBUMIN/CREAT UR-RTO: ABNORMAL (ref 0–30)
MONOCYTES ABSOLUTE: 0.4 E9/L (ref 0.1–0.95)
MONOCYTES RELATIVE PERCENT: 8 % (ref 2–12)
NEUTROPHILS ABSOLUTE: 2.72 E9/L (ref 1.8–7.3)
NEUTROPHILS RELATIVE PERCENT: 54.1 % (ref 43–80)
NITRITE, URINE: NEGATIVE
PDW BLD-RTO: 14 FL (ref 11.5–15)
PH UA: 6 (ref 5–9)
PLATELET # BLD: 165 E9/L (ref 130–450)
PMV BLD AUTO: 13.2 FL (ref 7–12)
POTASSIUM SERPL-SCNC: 5.1 MMOL/L (ref 3.5–5)
PROTEIN UA: NEGATIVE MG/DL
RBC # BLD: 4.2 E12/L (ref 3.5–5.5)
RBC UA: ABNORMAL /HPF (ref 0–2)
RENAL EPITHELIAL, UA: ABNORMAL /HPF
SODIUM BLD-SCNC: 141 MMOL/L (ref 132–146)
SPECIFIC GRAVITY UA: 1.01 (ref 1–1.03)
T4 FREE: 1.23 NG/DL (ref 0.93–1.7)
TOTAL PROTEIN: 7.6 G/DL (ref 6.4–8.3)
TRIGL SERPL-MCNC: 139 MG/DL (ref 0–149)
TSH SERPL DL<=0.05 MIU/L-ACNC: 2.65 UIU/ML (ref 0.27–4.2)
UROBILINOGEN, URINE: 0.2 E.U./DL
VLDLC SERPL CALC-MCNC: 28 MG/DL
WBC # BLD: 5 E9/L (ref 4.5–11.5)
WBC UA: ABNORMAL /HPF (ref 0–5)

## 2020-08-24 PROCEDURE — 83036 HEMOGLOBIN GLYCOSYLATED A1C: CPT

## 2020-08-24 PROCEDURE — 81001 URINALYSIS AUTO W/SCOPE: CPT

## 2020-08-24 PROCEDURE — 80061 LIPID PANEL: CPT

## 2020-08-24 PROCEDURE — 84443 ASSAY THYROID STIM HORMONE: CPT

## 2020-08-24 PROCEDURE — 82570 ASSAY OF URINE CREATININE: CPT

## 2020-08-24 PROCEDURE — 36415 COLL VENOUS BLD VENIPUNCTURE: CPT

## 2020-08-24 PROCEDURE — 80053 COMPREHEN METABOLIC PANEL: CPT

## 2020-08-24 PROCEDURE — 82044 UR ALBUMIN SEMIQUANTITATIVE: CPT

## 2020-08-24 PROCEDURE — 84439 ASSAY OF FREE THYROXINE: CPT

## 2020-08-24 PROCEDURE — 85025 COMPLETE CBC W/AUTO DIFF WBC: CPT

## 2020-08-28 ENCOUNTER — TELEPHONE (OUTPATIENT)
Dept: FAMILY MEDICINE CLINIC | Age: 75
End: 2020-08-28

## 2020-08-28 RX ORDER — CEPHALEXIN 500 MG/1
500 CAPSULE ORAL 2 TIMES DAILY
Qty: 14 CAPSULE | Refills: 0 | Status: ON HOLD
Start: 2020-08-28 | End: 2020-09-04 | Stop reason: HOSPADM

## 2020-09-01 ENCOUNTER — OFFICE VISIT (OUTPATIENT)
Dept: CARDIOLOGY CLINIC | Age: 75
End: 2020-09-01
Payer: MEDICARE

## 2020-09-01 VITALS
BODY MASS INDEX: 31.61 KG/M2 | DIASTOLIC BLOOD PRESSURE: 86 MMHG | WEIGHT: 161 LBS | HEART RATE: 81 BPM | RESPIRATION RATE: 14 BRPM | SYSTOLIC BLOOD PRESSURE: 142 MMHG | HEIGHT: 60 IN

## 2020-09-01 PROCEDURE — 4040F PNEUMOC VAC/ADMIN/RCVD: CPT | Performed by: INTERNAL MEDICINE

## 2020-09-01 PROCEDURE — 99214 OFFICE O/P EST MOD 30 MIN: CPT | Performed by: INTERNAL MEDICINE

## 2020-09-01 PROCEDURE — 93000 ELECTROCARDIOGRAM COMPLETE: CPT | Performed by: INTERNAL MEDICINE

## 2020-09-01 PROCEDURE — 1090F PRES/ABSN URINE INCON ASSESS: CPT | Performed by: INTERNAL MEDICINE

## 2020-09-01 PROCEDURE — 3017F COLORECTAL CA SCREEN DOC REV: CPT | Performed by: INTERNAL MEDICINE

## 2020-09-01 PROCEDURE — G8400 PT W/DXA NO RESULTS DOC: HCPCS | Performed by: INTERNAL MEDICINE

## 2020-09-01 PROCEDURE — G8427 DOCREV CUR MEDS BY ELIG CLIN: HCPCS | Performed by: INTERNAL MEDICINE

## 2020-09-01 PROCEDURE — 1036F TOBACCO NON-USER: CPT | Performed by: INTERNAL MEDICINE

## 2020-09-01 PROCEDURE — 1123F ACP DISCUSS/DSCN MKR DOCD: CPT | Performed by: INTERNAL MEDICINE

## 2020-09-01 PROCEDURE — G8417 CALC BMI ABV UP PARAM F/U: HCPCS | Performed by: INTERNAL MEDICINE

## 2020-09-01 NOTE — PROGRESS NOTES
artery disease involving native coronary artery of native heart without angina pectoris    5. Stented coronary artery - Cx        HPI:  Patient is seen for new onset chest pain over the past week. She describes a squeezing sensation in her left upper chest that is not associated with exertion. She notes this occurs on and off. There is some radiation into her upper left arm. She denies any associated diaphoresis, nausea, lightheadedness or dizziness. She does note when she has this discomfort that her heart begins to race afterwards. She also notes that her blood pressure shoots up after these events. ROS:   General: No unusual weight gain, no change in exercise tolerance  Skin: No rash or itching  EENT: No vision changes or nosebleeds  Cardiovascular: No orthopnea or paroxysmal nocturnal dyspnea  Respiratory: No cough or hemoptysis  Gastrointestinal: No hematemesis or recent changes in bowel habits  Genitourinary: No hematuria, urgency or frequency  Musculoskeletal: No muscular weakness or joint swelling   Neurologic / Psychiatric: No incoordination or convulsions  Allergic / Immunologic/ Lymphatic / Endocrine: No anemia or bleeding tendency    Social History     Socioeconomic History    Marital status:      Spouse name: Not on file    Number of children: 0    Years of education: 15    Highest education level: Not on file   Occupational History    Occupation: retired   Social Needs    Financial resource strain: Not on file    Food insecurity     Worry: Not on file     Inability: Not on file   [employer] needs     Medical: Not on file     Non-medical: Not on file   Tobacco Use    Smoking status: Former Smoker     Packs/day: 0.50     Years: 15.00     Pack years: 7.50     Types: Cigarettes     Start date: 1959     Last attempt to quit: 1974     Years since quittin.8    Smokeless tobacco: Never Used   Substance and Sexual Activity    Alcohol use: No    Drug use:  No  Sexual activity: Not Currently     Partners: Male   Lifestyle    Physical activity     Days per week: Not on file     Minutes per session: Not on file    Stress: Not on file   Relationships    Social connections     Talks on phone: Not on file     Gets together: Not on file     Attends Orthodoxy service: Not on file     Active member of club or organization: Not on file     Attends meetings of clubs or organizations: Not on file     Relationship status: Not on file    Intimate partner violence     Fear of current or ex partner: Not on file     Emotionally abused: Not on file     Physically abused: Not on file     Forced sexual activity: Not on file   Other Topics Concern    Not on file   Social History Narrative    Not on file       Past Medical History:   Diagnosis Date    CAD (coronary artery disease)     follows with  [employer] yearly (next appt3/22/18)    Diabetes mellitus ([facility] [address]     Essential hypertension, benign 3/26/2014    History of colon polyps     Hyperinsulinism     Hyperlipidemia     Hypertension     Hypothyroidism        PHYSICAL EXAM:  CONSTITUTIONAL:  Well developed, well nourished    Vitals:    09/01/20 0820 09/01/20 0821   BP: (!) 152/86 (!) 142/86   Pulse: 81    Resp: 14    Weight: 161 lb (73 kg)    Height: 5' (1.524 m)      HEAD & FACE: Normocephalic. Symmetric. EYES: No xanthelasma. Conjunctivae not injected. EARS, NOSE, MOUTH & THROAT: Good dentition. No oral pallor or cyanosis. NECK: No JVD at 30 degrees. No thyromegaly. RESPIRATORY: Clear to auscultation and percussion in all fields. No use of accessory muscle or intercostal retractions. CARDIOVASCULAR: Regular rate and rhythm. No lifts or thrills on palpitation. Auscultation with normal S1-S2 in intensity and splitting. No carotid bruits. Abdominal aorta not enlarged. Femoral arteries without bruits. Pedal pulses 2+. No edema. ABDOMEN: Soft without hepatic or splenic enlargement. No tenderness.

## 2020-09-03 ENCOUNTER — APPOINTMENT (OUTPATIENT)
Dept: GENERAL RADIOLOGY | Age: 75
End: 2020-09-03
Payer: MEDICARE

## 2020-09-03 ENCOUNTER — HOSPITAL ENCOUNTER (OUTPATIENT)
Age: 75
Setting detail: OBSERVATION
Discharge: HOME | End: 2020-09-04
Attending: EMERGENCY MEDICINE | Admitting: INTERNAL MEDICINE
Payer: MEDICARE

## 2020-09-03 LAB
ALBUMIN SERPL-MCNC: 4 G/DL (ref 3.5–5.2)
ALP BLD-CCNC: 92 U/L (ref 35–104)
ALT SERPL-CCNC: 37 U/L (ref 0–32)
ANION GAP SERPL CALCULATED.3IONS-SCNC: 9 MMOL/L (ref 7–16)
AST SERPL-CCNC: 39 U/L (ref 0–31)
BASOPHILS ABSOLUTE: 0.04 E9/L (ref 0–0.2)
BASOPHILS RELATIVE PERCENT: 0.9 % (ref 0–2)
BILIRUB SERPL-MCNC: 0.7 MG/DL (ref 0–1.2)
BILIRUBIN DIRECT: <0.2 MG/DL (ref 0–0.3)
BILIRUBIN, INDIRECT: ABNORMAL MG/DL (ref 0–1)
BUN BLDV-MCNC: 15 MG/DL (ref 8–23)
CALCIUM SERPL-MCNC: 10 MG/DL (ref 8.6–10.2)
CHLORIDE BLD-SCNC: 106 MMOL/L (ref 98–107)
CO2: 25 MMOL/L (ref 22–29)
CREAT SERPL-MCNC: 0.8 MG/DL (ref 0.5–1)
EKG ATRIAL RATE: 91 BPM
EKG P AXIS: 53 DEGREES
EKG P-R INTERVAL: 180 MS
EKG Q-T INTERVAL: 378 MS
EKG QRS DURATION: 86 MS
EKG QTC CALCULATION (BAZETT): 464 MS
EKG R AXIS: 22 DEGREES
EKG T AXIS: 42 DEGREES
EKG VENTRICULAR RATE: 91 BPM
EOSINOPHILS ABSOLUTE: 0.16 E9/L (ref 0.05–0.5)
EOSINOPHILS RELATIVE PERCENT: 3.6 % (ref 0–6)
GFR AFRICAN AMERICAN: >60
GFR NON-AFRICAN AMERICAN: >60 ML/MIN/1.73
GLUCOSE BLD-MCNC: 178 MG/DL (ref 74–99)
HCT VFR BLD CALC: 39.1 % (ref 34–48)
HEMOGLOBIN: 12.9 G/DL (ref 11.5–15.5)
IMMATURE GRANULOCYTES #: 0.01 E9/L
IMMATURE GRANULOCYTES %: 0.2 % (ref 0–5)
INR BLD: 1
LIPASE: 29 U/L (ref 13–60)
LYMPHOCYTES ABSOLUTE: 1.54 E9/L (ref 1.5–4)
LYMPHOCYTES RELATIVE PERCENT: 34.8 % (ref 20–42)
MCH RBC QN AUTO: 31.8 PG (ref 26–35)
MCHC RBC AUTO-ENTMCNC: 33 % (ref 32–34.5)
MCV RBC AUTO: 96.3 FL (ref 80–99.9)
MONOCYTES ABSOLUTE: 0.25 E9/L (ref 0.1–0.95)
MONOCYTES RELATIVE PERCENT: 5.7 % (ref 2–12)
NEUTROPHILS ABSOLUTE: 2.42 E9/L (ref 1.8–7.3)
NEUTROPHILS RELATIVE PERCENT: 54.8 % (ref 43–80)
PDW BLD-RTO: 13.3 FL (ref 11.5–15)
PLATELET # BLD: 144 E9/L (ref 130–450)
PMV BLD AUTO: 13 FL (ref 7–12)
POTASSIUM REFLEX MAGNESIUM: 4.6 MMOL/L (ref 3.5–5)
PRO-BNP: 137 PG/ML (ref 0–450)
PROTHROMBIN TIME: 11.8 SEC (ref 9.3–12.4)
RBC # BLD: 4.06 E12/L (ref 3.5–5.5)
SODIUM BLD-SCNC: 140 MMOL/L (ref 132–146)
TOTAL PROTEIN: 7.4 G/DL (ref 6.4–8.3)
TROPONIN: <0.01 NG/ML (ref 0–0.03)
WBC # BLD: 4.4 E9/L (ref 4.5–11.5)

## 2020-09-03 PROCEDURE — 83880 ASSAY OF NATRIURETIC PEPTIDE: CPT

## 2020-09-03 PROCEDURE — 85610 PROTHROMBIN TIME: CPT

## 2020-09-03 PROCEDURE — 85025 COMPLETE CBC W/AUTO DIFF WBC: CPT

## 2020-09-03 PROCEDURE — G0378 HOSPITAL OBSERVATION PER HR: HCPCS

## 2020-09-03 PROCEDURE — 83690 ASSAY OF LIPASE: CPT

## 2020-09-03 PROCEDURE — 93005 ELECTROCARDIOGRAM TRACING: CPT | Performed by: EMERGENCY MEDICINE

## 2020-09-03 PROCEDURE — 99284 EMERGENCY DEPT VISIT MOD MDM: CPT

## 2020-09-03 PROCEDURE — APPSS60 APP SPLIT SHARED TIME 46-60 MINUTES: Performed by: PHYSICIAN ASSISTANT

## 2020-09-03 PROCEDURE — 84484 ASSAY OF TROPONIN QUANT: CPT

## 2020-09-03 PROCEDURE — 6370000000 HC RX 637 (ALT 250 FOR IP): Performed by: EMERGENCY MEDICINE

## 2020-09-03 PROCEDURE — 71045 X-RAY EXAM CHEST 1 VIEW: CPT

## 2020-09-03 PROCEDURE — 99285 EMERGENCY DEPT VISIT HI MDM: CPT

## 2020-09-03 PROCEDURE — 93010 ELECTROCARDIOGRAM REPORT: CPT | Performed by: INTERNAL MEDICINE

## 2020-09-03 PROCEDURE — 80076 HEPATIC FUNCTION PANEL: CPT

## 2020-09-03 PROCEDURE — 80048 BASIC METABOLIC PNL TOTAL CA: CPT

## 2020-09-03 PROCEDURE — 36415 COLL VENOUS BLD VENIPUNCTURE: CPT

## 2020-09-03 PROCEDURE — 6370000000 HC RX 637 (ALT 250 FOR IP): Performed by: INTERNAL MEDICINE

## 2020-09-03 PROCEDURE — 99214 OFFICE O/P EST MOD 30 MIN: CPT | Performed by: INTERNAL MEDICINE

## 2020-09-03 RX ORDER — ROSUVASTATIN CALCIUM 10 MG/1
10 TABLET, COATED ORAL NIGHTLY
Status: DISCONTINUED | OUTPATIENT
Start: 2020-09-03 | End: 2020-09-04 | Stop reason: HOSPADM

## 2020-09-03 RX ORDER — ASPIRIN 81 MG/1
81 TABLET, CHEWABLE ORAL EVERY MORNING
Status: DISCONTINUED | OUTPATIENT
Start: 2020-09-03 | End: 2020-09-04 | Stop reason: HOSPADM

## 2020-09-03 RX ORDER — ACETAMINOPHEN 650 MG/1
650 SUPPOSITORY RECTAL EVERY 6 HOURS PRN
Status: DISCONTINUED | OUTPATIENT
Start: 2020-09-03 | End: 2020-09-04 | Stop reason: HOSPADM

## 2020-09-03 RX ORDER — LOSARTAN POTASSIUM 50 MG/1
50 TABLET ORAL DAILY
Status: DISCONTINUED | OUTPATIENT
Start: 2020-09-03 | End: 2020-09-04 | Stop reason: HOSPADM

## 2020-09-03 RX ORDER — PROMETHAZINE HYDROCHLORIDE 25 MG/1
12.5 TABLET ORAL EVERY 6 HOURS PRN
Status: DISCONTINUED | OUTPATIENT
Start: 2020-09-03 | End: 2020-09-04 | Stop reason: HOSPADM

## 2020-09-03 RX ORDER — AMLODIPINE BESYLATE 5 MG/1
5 TABLET ORAL DAILY
Status: DISCONTINUED | OUTPATIENT
Start: 2020-09-03 | End: 2020-09-04 | Stop reason: HOSPADM

## 2020-09-03 RX ORDER — OMEPRAZOLE 20 MG/1
40 CAPSULE, DELAYED RELEASE ORAL EVERY MORNING
Status: DISCONTINUED | OUTPATIENT
Start: 2020-09-03 | End: 2020-09-04 | Stop reason: HOSPADM

## 2020-09-03 RX ORDER — ONDANSETRON 2 MG/ML
4 INJECTION INTRAMUSCULAR; INTRAVENOUS EVERY 6 HOURS PRN
Status: DISCONTINUED | OUTPATIENT
Start: 2020-09-03 | End: 2020-09-04 | Stop reason: HOSPADM

## 2020-09-03 RX ORDER — METOPROLOL SUCCINATE 25 MG/1
12.5 TABLET, EXTENDED RELEASE ORAL 2 TIMES DAILY
Status: DISCONTINUED | OUTPATIENT
Start: 2020-09-03 | End: 2020-09-04 | Stop reason: HOSPADM

## 2020-09-03 RX ORDER — ASPIRIN 325 MG
325 TABLET ORAL ONCE
Status: COMPLETED | OUTPATIENT
Start: 2020-09-03 | End: 2020-09-03

## 2020-09-03 RX ORDER — LEVOTHYROXINE SODIUM 0.07 MG/1
75 TABLET ORAL EVERY MORNING
Status: DISCONTINUED | OUTPATIENT
Start: 2020-09-04 | End: 2020-09-04 | Stop reason: HOSPADM

## 2020-09-03 RX ORDER — ACETAMINOPHEN 325 MG/1
650 TABLET ORAL EVERY 6 HOURS PRN
Status: DISCONTINUED | OUTPATIENT
Start: 2020-09-03 | End: 2020-09-04 | Stop reason: HOSPADM

## 2020-09-03 RX ORDER — OLMESARTAN MEDOXOMIL 20 MG/1
20 TABLET ORAL EVERY MORNING
Status: DISCONTINUED | OUTPATIENT
Start: 2020-09-03 | End: 2020-09-03 | Stop reason: ALTCHOICE

## 2020-09-03 RX ADMIN — METOPROLOL SUCCINATE 12.5 MG: 25 TABLET, EXTENDED RELEASE ORAL at 13:10

## 2020-09-03 RX ADMIN — ROSUVASTATIN CALCIUM 10 MG: 10 TABLET, FILM COATED ORAL at 20:42

## 2020-09-03 RX ADMIN — ASPIRIN 81 MG: 81 TABLET, CHEWABLE ORAL at 13:10

## 2020-09-03 RX ADMIN — LOSARTAN POTASSIUM 50 MG: 50 TABLET ORAL at 13:10

## 2020-09-03 RX ADMIN — ASPIRIN 325 MG: 325 TABLET ORAL at 11:27

## 2020-09-03 RX ADMIN — METOPROLOL SUCCINATE 12.5 MG: 25 TABLET, EXTENDED RELEASE ORAL at 20:42

## 2020-09-03 RX ADMIN — OMEPRAZOLE 40 MG: 20 CAPSULE, DELAYED RELEASE ORAL at 13:10

## 2020-09-03 RX ADMIN — AMLODIPINE BESYLATE 5 MG: 5 TABLET ORAL at 13:10

## 2020-09-03 ASSESSMENT — ENCOUNTER SYMPTOMS
SHORTNESS OF BREATH: 1
FACIAL SWELLING: 0
APNEA: 0
BACK PAIN: 0
ABDOMINAL PAIN: 0
SINUS PRESSURE: 0
SINUS PAIN: 0

## 2020-09-03 ASSESSMENT — PAIN SCALES - GENERAL
PAINLEVEL_OUTOF10: 5
PAINLEVEL_OUTOF10: 0
PAINLEVEL_OUTOF10: 0

## 2020-09-03 ASSESSMENT — PAIN DESCRIPTION - DESCRIPTORS: DESCRIPTORS: ACHING

## 2020-09-03 ASSESSMENT — PAIN DESCRIPTION - LOCATION: LOCATION: CHEST

## 2020-09-03 NOTE — H&P
Internal Medicine History & Physical     Name: Kristen Multani  : [id number]  Chief Complaint: Chest Pain  Primary Care Physician: Carmen Lau MD  Admission date: 9/3/2020  Date of service: 9/3/2020     History of Present Illness  Camila is a 75y.o. year old female. She presents to the hospital with a chief complaint of chest tightness. She also has associated diaphoresis. It started today when she was driving. She pulled over. She called her . He brought her to the emergency department. She had some tingling in her arm a few days ago for this reason saw cardiology on Tuesday who set her up for a stress test next Monday. She states that she is a very anxious person. Nothing in particular seems to make her symptoms better or worse. She denies any other associated symptoms other than shortness of breath. She does have some pain on palpation of the chest.  She has had a cardiac cath in the past and had 2 stents placed in . She had 2 further cardiac cath that were negative and the last one was in . Her last stress test about a year ago was negative. Her  was present at bedside. History is provided by the patient. She is felt to be a good historian. ED course:   Initial blood work and imaging studies performed. Admission recommended by ED physician. Case discussed with ED provider.  Meds in ED consisted of the following:  Medications   aspirin tablet 325 mg (has no administration in time range)       Past Medical History:   Diagnosis Date    CAD (coronary artery disease)     follows with Dr. Cor[facility] yearly (next appt3)    Diabetes mellitus ([facility] [address]     Essential hypertension, benign 3/26/2014    History of colon polyps     Hyperinsulinism     Hyperlipidemia     Hypertension     Hypothyroidism        Past Surgical History:   Procedure Laterality Date    APPENDECTOMY      CARDIAC CATHETERIZATION  2011    CARDIAC CATHETERIZATION  2015    Dr. Hilary Gray SURGERY  4/11    stent placement    CARPAL TUNNEL RELEASE Bilateral     CARPAL TUNNEL RELEASE Left 11/2013    COLONOSCOPY      COLONOSCOPY  07/02/2012    CORONARY ANGIOPLASTY      DIAGNOSTIC CARDIAC CATH LAB PROCEDURE      ENDOSCOPY, COLON, DIAGNOSTIC      EYE SURGERY Right     cataract    HYSTERECTOMY      HYSTERECTOMY, TOTAL ABDOMINAL      POLYPECTOMY  2012    PTCA  2011    WITH STENT 2 TIMES     UPPER GASTROINTESTINAL ENDOSCOPY  2012       Family History   Problem Relation Age of Onset    Heart Attack Mother     Heart Attack Father     Heart Surgery Brother     Heart Attack Sister        Social History  Patient lives at home with her . Employment: Retired since 3242  Illicit drug use- denies  TOBACCO:   reports that she quit smoking about 45 years ago. Her smoking use included cigarettes. She started smoking about 60 years ago. She has a 7.50 pack-year smoking history. She has never used smokeless tobacco.  ETOH:   reports no history of alcohol use. Home Medications  Prior to Admission medications    Medication Sig Start Date End Date Taking?  Authorizing Provider   cephALEXin (KEFLEX) 500 MG capsule Take 1 capsule by mouth 2 times daily for 7 days 8/28/20 9/4/20 Yes Trudy Self MD   vitamin D (CHOLECALCIFEROL) 125 MCG (5000 UT) CAPS capsule Take 5,000 Units by mouth daily   Yes Historical Provider, MD   metFORMIN (GLUCOPHAGE) 500 MG tablet 500 mg daily (with breakfast)  6/2/20  Yes Historical Provider, MD   SYNTHROID 75 MCG tablet Take 1 tablet by mouth every morning 5/14/20  Yes Trudy Self MD   rosuvastatin (CRESTOR) 10 MG tablet Take 1 tablet by mouth nightly 5/14/20  Yes Trudy Self MD   omeprazole (PRILOSEC) 40 MG delayed release capsule Take 1 capsule by mouth every morning 5/14/20  Yes Trudy Self MD   olmesartan (BENICAR) 20 MG tablet Take 1 tablet by mouth every morning 5/14/20  Yes Trudy Self MD   metoprolol succinate (TOPROL XL) 25 MG extended release tablet Take 0.5 tablets by mouth 2 times daily 12.5mg BID 5/14/20  Yes Ru Pena MD   amLODIPine (NORVASC) 5 MG tablet Take 0.5 tablets by mouth daily  Patient taking differently: Take 5 mg by mouth nightly  5/14/20  Yes Ru Pena MD   vitamin E 400 UNIT capsule Take 400 Units by mouth every morning   Yes Historical Provider, MD   vitamin C (ASCORBIC ACID) 500 MG tablet Take 1,000 mg by mouth daily    Yes Historical Provider, MD   aspirin 81 MG chewable tablet Take 81 mg by mouth every morning    Yes Historical Provider, MD   nitroGLYCERIN (NITROSTAT) 0.4 MG SL tablet Place 1 tablet under the tongue every 5 minutes as needed for Chest pain 4/16/20   Lou Mackey MD       Allergies  Allergies   Allergen Reactions    Epinephrine      \"makes me spacey\"    Other Nausea Only     unknown antibiotic       Review of Systems  Please see HPI above. All bolded are positive. All un-bolded are negative.   Constitutional Symptoms: fever, chills, fatigue, generalized weakness, diaphoresis, increase in thirst, loss of appetite  Eyes: vision change   Ears, Nose, Mouth, Throat: hearing loss, nasal congestion, sores in the mouth  Cardiovascular: chest pain, chest heaviness, palpitations  Respiratory: shortness of breath, wheezing, coughing  Gastrointestinal: abdominal pain, nausea, vomiting, diarrhea, constipation, melena, hematochezia, hematemesis  Genitourinary: dysuria, hematuria, increased frequency  Musculoskeletal: lower extremity edema, myalgias, arthralgias, back pain  Integumentary: rashes, itching   Neurological: headache, lightheadedness, dizziness, confusion, syncope, numbness, tingling, focal weakness  Psychiatric: depression, suicidal ideation, anxiety  Endocrine: unintentional weight change  Hematologic/Lymphatic: lymphadenopathy, easy bruising, easy bleeding   Allergic/Immunologic: recurrent infections      Objective  VITALS:  BP (!) 168/79   Pulse 68   Temp 98 °F (36.7 °C) (Oral)   Resp 18   Ht 5' (1.524 m)   Wt 162 lb (73.5 kg)   SpO2 99%   BMI 31.64 kg/m²     Physical Exam:  General: awake, alert, oriented to person, place, time, and purpose, appears stated age, cooperative, no acute distress, pleasant, appropriate mood, seems anxious  Eyes: conjunctivae/corneas clear, sclera non icteric, EOMI  Ears: no obvious scars, no lesions, no masses, hearing intact  Mouth: mucous membranes moist, no obvious oral sores  Head: normocephalic, atraumatic  Neck: no JVD, no adenopathy, no thyromegaly, neck is supple, trachea is midline  Back: ROM normal, no CVA tenderness. Chest: no pain on palpation  Lungs: clear to auscultation bilaterally, without rhonchi, crackle, wheezing, or rale, no retractions or use of accessory muscles  Heart: regular rate and regular rhythm, no murmur, normal S1, S2  Abdomen: Obese, soft, non-tender; bowel sounds normal; no masses, no organomegaly  : Deferred   Extremities: no lower extremity edema, extremities atraumatic, no cyanosis, no clubbing, 2+ pedal pulses palpated  Skin: normal color, normal texture, normal turgor, no rashes, no lesions  Neurologic:5/5 muscle strength throughout, normal muscle tone throughout, face symmetric, hearing intact, tongue midline, speech appropriate without slurring, sensation to fine touch intact in upper and lower extremities    Labs-   Lab Results   Component Value Date    WBC 4.4 (L) 09/03/2020    HGB 12.9 09/03/2020    HCT 39.1 09/03/2020     09/03/2020     09/03/2020    K 4.6 09/03/2020     09/03/2020    CREATININE 0.8 09/03/2020    BUN 15 09/03/2020    CO2 25 09/03/2020    GLUCOSE 178 (H) 09/03/2020    ALT 37 (H) 09/03/2020    AST 39 (H) 09/03/2020    INR 1.0 09/03/2020     Lab Results   Component Value Date    CKTOTAL 107 11/13/2018    CKMB 4.3 11/13/2018    TROPONINI <0.01 09/03/2020       Recent Radiological Studies:  XR CHEST PORTABLE   Final Result   No acute cardiopulmonary abnormality.           Assessment  Active Hospital Problems Diagnosis    Chest pain [R07.9]     Priority: High       Patient Active Problem List    Diagnosis Date Noted    Chest pain 11/12/2018     Priority: High    Hypercalcemia 07/17/2019     Priority: Medium    CAD (coronary artery disease)      Priority: Medium     follows with Dr. Shay Stahl yearly (next appt3/22/18)      Stented coronary artery - Cx 12/20/2013     Priority: Medium    Coronary artery disease involving native coronary artery with angina pectoris ([facility] 08/18/2020    Acute infection of nasal sinus 10/19/2019    Bronchitis 10/19/2019    Hypothyroidism     Hyperlipidemia     Diabetes mellitus ([facility]     Essential hypertension, benign 03/26/2014       Plan  Chest pain: Observation. Telemetry. Cardiology following-plans for stress test tomorrow. CE's negative so far. ASA. Lipid panel in the morning. Symptoms possibly related to anxiety  · Continue home medications  · Follow labs  · DVT prophylaxis. · Please see orders for further management and care. ·  for discharge planning  · Discharge plan: TBD pending clinical improvement     Mayo Billy DO  9/3/2020  2:53 PM    I can be reached through [employer]. NOTE:  This report was transcribed using voice recognition software. Every effort was made to ensure accuracy; however, inadvertent computerized transcription errors may be present.

## 2020-09-03 NOTE — PROGRESS NOTES
Database complete. Medications reconciled. Care plans and education initiated. Cardiologist is Dr. Jeanine Cheney. Was placed on Keflex 1 week ago for UTI.

## 2020-09-03 NOTE — ED PROVIDER NOTES
Mateo Meehan is a 75 y.o. female presenting to the ED for sscp, beginning earlier this am ago. The complaint has been intermittent, moderate in severity, and worsened by nothing. 74 yo m who presents for sscp. She has a significant history of CAD and is suppose to have a nuclear stress w dr Kavin Dakins young on 9/8. Pt notes this am was diaophoretic and started have sscp described as pressure and tightness. Pt denies any chest pain now. She was sob but not now, denies gerd sx, denies n.v.d.f/c/s/cough or cold sx. Denies covid exposures. pcp dr Jake Callejas    Review of Systems:   Review of Systems   Constitutional: Negative for activity change, fatigue and fever. HENT: Negative for congestion, facial swelling, sinus pressure and sinus pain. Eyes: Negative for visual disturbance. Respiratory: Positive for shortness of breath. Negative for apnea. Cardiovascular: Positive for chest pain. Gastrointestinal: Negative for abdominal pain. Endocrine: Negative for polyuria. Genitourinary: Negative for difficulty urinating and dysuria. Musculoskeletal: Negative for arthralgias and back pain. Allergic/Immunologic: Negative for immunocompromised state. Neurological: Negative for dizziness, facial asymmetry and light-headedness. Hematological: Negative for adenopathy. Psychiatric/Behavioral: Negative for agitation.                 --------------------------------------------- PAST HISTORY ---------------------------------------------  Past Medical History:  has a past medical history of CAD (coronary artery disease), Diabetes mellitus ([facility] [address] Essential hypertension, benign, History of colon polyps, Hyperinsulinism, Hyperlipidemia, Hypertension, and Hypothyroidism. Past Surgical History:  has a past surgical history that includes Cardiac surgery (4/11); Cardiac catheterization (5/2011); Diagnostic Cardiac Cath Lab Procedure; Coronary angioplasty;  Colonoscopy; Endoscopy, colon, diagnostic; Hysterectomy; Appendectomy; Colonoscopy (07/02/2012); Carpal tunnel release (Bilateral); Cardiac catheterization (1-); eye surgery (Right); Percutaneous Transluminal Coronary Angio (2011); Hysterectomy, total abdominal; Upper gastrointestinal endoscopy (2012); polypectomy (2012); and Carpal tunnel release (Left, 11/2013). Social History:  reports that she quit smoking about 45 years ago. Her smoking use included cigarettes. She started smoking about 60 years ago. She has a 7.50 pack-year smoking history. She has never used smokeless tobacco. She reports that she does not drink alcohol or use drugs. Family History: family history includes Heart Attack in her father, mother, and sister; Heart Surgery in her brother. The patients home medications have been reviewed.     Allergies: Epinephrine and Other    -------------------------------------------------- RESULTS -------------------------------------------------  All laboratory and radiology results have been personally reviewed by myself   LABS:  Results for orders placed or performed during the hospital encounter of 96/26/70   Basic Metabolic Panel w/ Reflex to MG   Result Value Ref Range    Sodium 140 132 - 146 mmol/L    Potassium reflex Magnesium 4.6 3.5 - 5.0 mmol/L    Chloride 106 98 - 107 mmol/L    CO2 25 22 - 29 mmol/L    Anion Gap 9 7 - 16 mmol/L    Glucose 178 (H) 74 - 99 mg/dL    BUN 15 8 - 23 mg/dL    CREATININE 0.8 0.5 - 1.0 mg/dL    GFR Non-African American >60 >=60 mL/min/1.73    GFR African American >60     Calcium 10.0 8.6 - 10.2 mg/dL   CBC Auto Differential   Result Value Ref Range    WBC 4.4 (L) 4.5 - 11.5 E9/L    RBC 4.06 3.50 - 5.50 E12/L    Hemoglobin 12.9 11.5 - 15.5 g/dL    Hematocrit 39.1 34.0 - 48.0 %    MCV 96.3 80.0 - 99.9 fL    MCH 31.8 26.0 - 35.0 pg    MCHC 33.0 32.0 - 34.5 %    RDW 13.3 11.5 - 15.0 fL    Platelets 309 953 - 149 E9/L    MPV 13.0 (H) 7.0 - 12.0 fL    Neutrophils % 54.8 43.0 - 80.0 %    Immature Granulocytes % 0.2 0.0 - 5.0 %    Lymphocytes % 34.8 20.0 - 42.0 %    Monocytes % 5.7 2.0 - 12.0 %    Eosinophils % 3.6 0.0 - 6.0 %    Basophils % 0.9 0.0 - 2.0 %    Neutrophils Absolute 2.42 1.80 - 7.30 E9/L    Immature Granulocytes # 0.01 E9/L    Lymphocytes Absolute 1.54 1.50 - 4.00 E9/L    Monocytes Absolute 0.25 0.10 - 0.95 E9/L    Eosinophils Absolute 0.16 0.05 - 0.50 E9/L    Basophils Absolute 0.04 0.00 - 0.20 E9/L   Hepatic Function Panel   Result Value Ref Range    Total Protein 7.4 6.4 - 8.3 g/dL    Alb 4.0 3.5 - 5.2 g/dL    Alkaline Phosphatase 92 35 - 104 U/L    ALT 37 (H) 0 - 32 U/L    AST 39 (H) 0 - 31 U/L    Total Bilirubin 0.7 0.0 - 1.2 mg/dL    Bilirubin, Direct <0.2 0.0 - 0.3 mg/dL    Bilirubin, Indirect see below 0.0 - 1.0 mg/dL   Lipase   Result Value Ref Range    Lipase 29 13 - 60 U/L   Troponin   Result Value Ref Range    Troponin <0.01 0.00 - 0.03 ng/mL   Brain Natriuretic Peptide   Result Value Ref Range    Pro- 0 - 450 pg/mL   Protime-INR   Result Value Ref Range    Protime 11.8 9.3 - 12.4 sec    INR 1.0    EKG 12 Lead   Result Value Ref Range    Ventricular Rate 91 BPM    Atrial Rate 91 BPM    P-R Interval 180 ms    QRS Duration 86 ms    Q-T Interval 378 ms    QTc Calculation (Bazett) 464 ms    P Axis 53 degrees    R Axis 22 degrees    T Axis 42 degrees       RADIOLOGY:  Interpreted by Radiologist.  XR CHEST PORTABLE   Final Result   No acute cardiopulmonary abnormality.          ------------------------- NURSING NOTES AND VITALS REVIEWED ---------------------------   The nursing notes within the ED encounter and vital signs as below have been reviewed.    BP (!) 147/75   Pulse 79   Temp 97.7 °F (36.5 °C) (Oral)   Resp 16   Ht 5' (1.524 m)   Wt 162 lb (73.5 kg)   SpO2 97%   BMI 31.64 kg/m²   Oxygen Saturation Interpretation: Normal      ---------------------------------------------------PHYSICAL EXAM--------------------------------------  Physical Exam  Vitals signs and nursing note reviewed. Constitutional:       General: She is not in acute distress. Appearance: She is normal weight. She is not toxic-appearing. HENT:      Head: Normocephalic and atraumatic. Right Ear: External ear normal.      Left Ear: External ear normal.      Nose: Nose normal. No congestion. Mouth/Throat:      Mouth: Mucous membranes are moist.      Pharynx: Oropharynx is clear. Eyes:      Extraocular Movements: Extraocular movements intact. Pupils: Pupils are equal, round, and reactive to light. Neck:      Musculoskeletal: Normal range of motion and neck supple. No muscular tenderness. Vascular: No carotid bruit. Cardiovascular:      Rate and Rhythm: Normal rate and regular rhythm. Pulses: Normal pulses. Heart sounds: No murmur. Pulmonary:      Effort: Pulmonary effort is normal. No respiratory distress. Breath sounds: Normal breath sounds. No stridor. No wheezing, rhonchi or rales. Chest:      Chest wall: No tenderness. Abdominal:      General: Abdomen is flat. Bowel sounds are normal.      Tenderness: There is abdominal tenderness. There is no right CVA tenderness or left CVA tenderness. Musculoskeletal:         General: No swelling or tenderness. Right lower leg: No edema. Left lower leg: No edema. Skin:     General: Skin is warm. Capillary Refill: Capillary refill takes less than 2 seconds. Findings: No bruising or erythema. Neurological:      General: No focal deficit present. Mental Status: She is alert and oriented to person, place, and time. Sensory: No sensory deficit. Motor: No weakness. Deep Tendon Reflexes: Reflexes normal.   Psychiatric:         Mood and Affect: Mood normal.                 ------------------------------ ED COURSE/MEDICAL DECISION MAKING----------------------  Medications   aspirin tablet 325 mg (325 mg Oral Given 9/3/20 1127)     EKG: This EKG is signed and interpreted by me. EFLM:0233  Rate: 91  Rhythm: Sinus  Interpretation: no acute changes  Comparison: stable as compared to patient's most recent EKG      ED COURSE:       Medical Decision Making:    Pt with hsx of known cad, pt will be admitted, case d/w cardiology who saw pt in ED> pt was given asa  Risks and benefits were discussed with patient for All medications dispensed and given in department as well prescriptions prescribed for home, . The patient elected to take the medicine. Pt instructed on warning signs and precautions for medication side effects. The patient was given warning signs for when to seek medical attention. Counseled regarding todays diagnosis, including possible risks and complications,  especially if left uncontrolled. Counseled regarding the possible side effects, risks, benefits and alternatives to treatment; patient and/or guardian verbalizes understanding, agrees, feels comfortable with and wishes to proceed with treatment plan. Advised patient to call her primary care physician with any new medication issues, and read all Rx info from pharmacy to assure aware of all possible risks and side effects of medication before taking. I did discuss warning signs for when to return to the Emergency Room, and the patient verbalized understanding      Counseling: The emergency provider has spoken with the patient and discussed todays results, in addition to providing specific details for the plan of care and counseling regarding the diagnosis and prognosis. Questions are answered at this time and they are agreeable with the plan.      --------------------------------- IMPRESSION AND DISPOSITION ---------------------------------    IMPRESSION  1. Chest pain, unspecified type        DISPOSITION  Disposition: Admit to telemetry  Patient condition is fair      NOTE: This report was transcribed using voice recognition software.  Every effort was made to ensure accuracy; however, inadvertent computerized transcription errors may be present       Martine Perry DO  09/03/20 [id number]

## 2020-09-03 NOTE — CONSULTS
Inpatient Cardiology Consultation      Reason for Consult:  Chest pain     Consulting Physician: Dr Anastasia Gallegos    Requesting Physician:  Dr Ilir Darling    Date of Consultation: 9/3/2020    HISTORY OF PRESENT ILLNESS:   Ms Jaylon Mcintyre is a 39-year-old  female who is known to the practice through  [employer], last seen in the office 9/1/2020. He has a past medical history that includes coronary artery disease with history of stenting to the LCx, precordial chest pain, hypertension, hyperlipidemia, type 2 diabetes, GERD, hypothyroidism. Office visit 9/1/2020 with atypical chest pain, plans for exercise nuclear medicine stress test.    Presented to [facility] on 9/3/2020 at [id number] with chest pain. .  VS: 97.9-88-14- 96%-122/76  Labs: WBC 4.4, H&H 12.9/39.1, platelets 630. K+ 4.6, BUN/SCR 15/0.8, glucose 178. AST 39, ALT 37. Troponin negative, proBNP 137  Chest x-ray with no acute findings. EKG showing sinus rhythm heart rate 91. No acute ST-T wave changes suggesting ischemia    She was given 325 mg of aspirin. Cardiology was asked to see the patient for chest pain. She states that she first developed left-sided chest squeezing last Thursday and Friday 8/27-8/28 she scheduled an appointment with  [facility]. She was scheduled for a exercise stress test this coming Monday on 7/20/2020. This morning she woke up and was getting ready to go get her hair done when she developed chest pain that was midsternal and nonradiating. She states that started about an hour after she woke up. She states that with exertion the chest pain was not worse as she was getting ready for appointment. She tried to ignore it and drive to her appointment but the pressure got more intense while she was driving and she became diaphoretic. This scared her so she came to the emergency room. She denies significant shortness of breath, palpitations, orthopnea, PND.   She states that she may have felt a little bit short of breath but she was very nervous and states that she is a very anxious person. Denies dyspnea on exertion at baseline. She is a non-smoker. She does not use a cane or walker for ambulation assistance and is not on long-term oxygen therapy. She will ambulates around her home and performs all of her activities of daily living without chest pain or shortness of breath. She states that the chest pain was much less intense when she presented to the emergency room and is now completely chest pain/pressure free. Please note: past medical records were reviewed per electronic medical record (EMR) - see detailed reports under Past Medical/ Surgical History. Past Medical and Surgical History:    1. Coronary artery disease  2. Cardiac Catheterization 1/20/2015 Daksha Corrales): Atherosclerotic coronary artery disease. Status post remote stenting of the circumflex with demonstration of wide patency today. Normal left ventricular size and systolic function  3. Echocardiogram 11/13/2018 Domingo Garner):  Ejection fraction is visually estimated at 60%. No regional wall motion abnormalities seen. Normal right ventricle structure and function. Physiologic and/or trace mitral regurgitation is present. 4. Lexiscan NM Stress test 7/18/2019: EF 70%, no wall motion abnormalities, no reversible defect   5. Hypertension  6. Hyperlipidemia  7. Type 2 diabetes melitus  8. GERD  9. H/o appendectomy, hysterectomy    Medications Prior to admit:  Prior to Admission medications    Medication Sig Start Date End Date Taking?  Authorizing Provider   cephALEXin (KEFLEX) 500 MG capsule Take 1 capsule by mouth 2 times daily for 7 days 8/28/20 9/4/20  Ru Pena MD   vitamin D (CHOLECALCIFEROL) 125 MCG (5000 UT) CAPS capsule Take 5,000 Units by mouth daily    Historical Provider, MD   metFORMIN (GLUCOPHAGE) 500 MG tablet 500 mg daily (with breakfast)  6/2/20   Historical Provider, MD   SYNTHROID 75 MCG tablet Take 1 tablet by mouth every morning 5/14/20   Ru Pena MD CREATININE 0.8   LABGLOM >60   CALCIUM 10.0     HgA1c:   Lab Results   Component Value Date    LABA1C 6.1 (H) 08/24/2020     PT/INR:   Recent Labs     09/03/20 0920   PROTIME 11.8   INR 1.0     CARDIAC ENZYMES:  Recent Labs     09/03/20 0920   TROPONINI <0.01     FASTING LIPID PANEL:  Lab Results   Component Value Date    CHOL 136 08/24/2020    HDL 43 08/24/2020    LDLCALC 65 08/24/2020    TRIG 139 08/24/2020     LIVER PROFILE:  Recent Labs     09/03/20 0920   AST 39*   ALT 37*   LABALBU 4.0       Assessment:   1. Admitted with chest pain/pressure. Has been constant with slight increase since this morning. Not worse with exertion. First troponin negative, no new ischemic changes on EKG. 2. Recurrent precordial chest pain  3. Known history of coronary artery disease with history of LCx stenting, patent stent on cath in 2015. Kalyani Formica 7/2019 negative for reversible ischemia, no wall motion abnormalities. 4. Hypertension, controlled  5. Hyperlipidemia, on statin  6. Hypothyroidism, on hormone replacement therapy  7. Non-insulin requiring type 2 diabetes mellitus, A1c 6.1 8/24/2020  8. GERD    Plan:   1. Cycle enzymes  2. Exercise nuclear medicine stress test in the morning, n.p.o. after midnight  3. Continue home cardiac medications the same  4. Will follow    Above discussed with Dr. Bela Kc  Electronically signed by Babak Interianoma on 9/3/2020 at 11:51 AM     _________________________________________________________________  I independently interviewed and examined the patient. I have reviewed the above documentation completed by the MICHEAL. Please see my additional contributions to the HPI, physical exam, and assessment / medical decision making. HPI, ROS, PMH, PSH, [address], [place], and medications independently reviewed (agree; see above documentation)    History of Present Illness:  Currently with no chest pain, respiratory distress, palpitations. SR on EKG and telemetry.     Review of Systems:   Cardiac: As per HPI  General: No fever, chills  Pulmonary: As per HPI  HEENT: No visual disturbances, difficult swallowing  GI: No nausea, vomiting  Musculoskeletal: MOLINA x 4, no focal motor deficits  Skin: Intact, no rashes  Neuro/Psych: No headache or seizures    Physical Exam:  BP (!) 145/71   Pulse 73   Temp 97.9 °F (36.6 °C) (Oral)   Resp 18   Ht 5' (1.524 m)   Wt 162 lb (73.5 kg)   SpO2 99%   BMI 31.64 kg/m²   Wt Readings from Last 3 Encounters:   09/03/20 162 lb (73.5 kg)   09/01/20 161 lb (73 kg)   08/18/20 164 lb (74.4 kg)     Appearance: Awake, alert, no acute respiratory distress  Skin: Intact, no rash  Head: Normocephalic, atraumatic  Eyes: EOMI, no conjunctival erythema  ENMT: No pharyngeal erythema, MMM, no rhinorrhea  Neck: Supple, no elevated JVP, no carotid bruits  Lungs: Clear to auscultation bilaterally. No wheezes, rales, or rhonchi. Cardiac: Regular rate and rhythm, +S1S2, no murmurs apparent  Abdomen: Soft, nontender, +bowel sounds  Extremities: Moves all extremities x 4, no lower extremity edema  Neurologic: No focal motor deficits apparent, normal mood and affect    - Currently CP free, no acute STT changes on EKG, negative troponin x 1  - If repeat enzymes negative, exercise nuclear stress test tomorrow      Thank you for allowing me to participate in your patient's care. Please feel free to contact me if you have any questions or concerns.     Rowena Reese MD  [facility]) Cardiology

## 2020-09-04 ENCOUNTER — APPOINTMENT (OUTPATIENT)
Dept: NUCLEAR MEDICINE | Age: 75
End: 2020-09-04
Payer: MEDICARE

## 2020-09-04 ENCOUNTER — APPOINTMENT (OUTPATIENT)
Dept: NON INVASIVE DIAGNOSTICS | Age: 75
End: 2020-09-04
Payer: MEDICARE

## 2020-09-04 VITALS
SYSTOLIC BLOOD PRESSURE: 145 MMHG | DIASTOLIC BLOOD PRESSURE: 70 MMHG | RESPIRATION RATE: 16 BRPM | OXYGEN SATURATION: 97 % | TEMPERATURE: 98.5 F | BODY MASS INDEX: 31.8 KG/M2 | WEIGHT: 162 LBS | HEIGHT: 60 IN | HEART RATE: 80 BPM

## 2020-09-04 PROBLEM — J01.90 ACUTE INFECTION OF NASAL SINUS: Status: RESOLVED | Noted: 2019-10-19 | Resolved: 2020-09-04

## 2020-09-04 PROBLEM — E83.52 HYPERCALCEMIA: Status: RESOLVED | Noted: 2019-07-17 | Resolved: 2020-09-04

## 2020-09-04 PROBLEM — I25.119 CORONARY ARTERY DISEASE INVOLVING NATIVE CORONARY ARTERY WITH ANGINA PECTORIS (HCC): Status: RESOLVED | Noted: 2020-08-18 | Resolved: 2020-09-04

## 2020-09-04 PROBLEM — J40 BRONCHITIS: Status: RESOLVED | Noted: 2019-10-19 | Resolved: 2020-09-04

## 2020-09-04 LAB
ALBUMIN SERPL-MCNC: 3.8 G/DL (ref 3.5–5.2)
ALP BLD-CCNC: 89 U/L (ref 35–104)
ALT SERPL-CCNC: 34 U/L (ref 0–32)
ANION GAP SERPL CALCULATED.3IONS-SCNC: 7 MMOL/L (ref 7–16)
AST SERPL-CCNC: 31 U/L (ref 0–31)
BILIRUB SERPL-MCNC: 0.7 MG/DL (ref 0–1.2)
BUN BLDV-MCNC: 16 MG/DL (ref 8–23)
CALCIUM SERPL-MCNC: 10.1 MG/DL (ref 8.6–10.2)
CHLORIDE BLD-SCNC: 108 MMOL/L (ref 98–107)
CHOLESTEROL, TOTAL: 122 MG/DL (ref 0–199)
CO2: 26 MMOL/L (ref 22–29)
CREAT SERPL-MCNC: 0.9 MG/DL (ref 0.5–1)
GFR AFRICAN AMERICAN: >60
GFR NON-AFRICAN AMERICAN: >60 ML/MIN/1.73
GLUCOSE BLD-MCNC: 117 MG/DL (ref 74–99)
HCT VFR BLD CALC: 39.7 % (ref 34–48)
HDLC SERPL-MCNC: 40 MG/DL
HEMOGLOBIN: 13 G/DL (ref 11.5–15.5)
LDL CHOLESTEROL CALCULATED: 63 MG/DL (ref 0–99)
LV EF: 79 %
LVEF MODALITY: NORMAL
MCH RBC QN AUTO: 31.6 PG (ref 26–35)
MCHC RBC AUTO-ENTMCNC: 32.7 % (ref 32–34.5)
MCV RBC AUTO: 96.4 FL (ref 80–99.9)
PDW BLD-RTO: 13.4 FL (ref 11.5–15)
PLATELET # BLD: 165 E9/L (ref 130–450)
PMV BLD AUTO: 12.7 FL (ref 7–12)
POTASSIUM REFLEX MAGNESIUM: 4.5 MMOL/L (ref 3.5–5)
RBC # BLD: 4.12 E12/L (ref 3.5–5.5)
SODIUM BLD-SCNC: 141 MMOL/L (ref 132–146)
TOTAL PROTEIN: 7.1 G/DL (ref 6.4–8.3)
TRIGL SERPL-MCNC: 96 MG/DL (ref 0–149)
VLDLC SERPL CALC-MCNC: 19 MG/DL
WBC # BLD: 6.3 E9/L (ref 4.5–11.5)

## 2020-09-04 PROCEDURE — 36415 COLL VENOUS BLD VENIPUNCTURE: CPT

## 2020-09-04 PROCEDURE — 6370000000 HC RX 637 (ALT 250 FOR IP): Performed by: INTERNAL MEDICINE

## 2020-09-04 PROCEDURE — 93018 CV STRESS TEST I&R ONLY: CPT | Performed by: STUDENT IN AN ORGANIZED HEALTH CARE EDUCATION/TRAINING PROGRAM

## 2020-09-04 PROCEDURE — 78452 HT MUSCLE IMAGE SPECT MULT: CPT

## 2020-09-04 PROCEDURE — 80053 COMPREHEN METABOLIC PANEL: CPT

## 2020-09-04 PROCEDURE — 93016 CV STRESS TEST SUPVJ ONLY: CPT | Performed by: STUDENT IN AN ORGANIZED HEALTH CARE EDUCATION/TRAINING PROGRAM

## 2020-09-04 PROCEDURE — 85027 COMPLETE CBC AUTOMATED: CPT

## 2020-09-04 PROCEDURE — 80061 LIPID PANEL: CPT

## 2020-09-04 PROCEDURE — G0378 HOSPITAL OBSERVATION PER HR: HCPCS

## 2020-09-04 PROCEDURE — A9500 TC99M SESTAMIBI: HCPCS | Performed by: RADIOLOGY

## 2020-09-04 PROCEDURE — 93017 CV STRESS TEST TRACING ONLY: CPT

## 2020-09-04 PROCEDURE — 3430000000 HC RX DIAGNOSTIC RADIOPHARMACEUTICAL: Performed by: RADIOLOGY

## 2020-09-04 RX ADMIN — METFORMIN HYDROCHLORIDE 500 MG: 500 TABLET ORAL at 13:25

## 2020-09-04 RX ADMIN — Medication 30 MILLICURIE: at 10:37

## 2020-09-04 RX ADMIN — LOSARTAN POTASSIUM 50 MG: 50 TABLET ORAL at 13:25

## 2020-09-04 RX ADMIN — ASPIRIN 81 MG: 81 TABLET, CHEWABLE ORAL at 13:25

## 2020-09-04 RX ADMIN — METOPROLOL SUCCINATE 12.5 MG: 25 TABLET, EXTENDED RELEASE ORAL at 13:25

## 2020-09-04 RX ADMIN — AMLODIPINE BESYLATE 5 MG: 5 TABLET ORAL at 13:25

## 2020-09-04 RX ADMIN — OMEPRAZOLE 40 MG: 20 CAPSULE, DELAYED RELEASE ORAL at 13:26

## 2020-09-04 RX ADMIN — Medication 10 MILLICURIE: at 10:37

## 2020-09-04 ASSESSMENT — PAIN SCALES - GENERAL: PAINLEVEL_OUTOF10: 0

## 2020-09-04 NOTE — PROGRESS NOTES
Intravenous Q6H PRN Mayo Billy DO        enoxaparin (LOVENOX) injection 40 mg  40 mg Subcutaneous Daily Mayo Billy DO        losartan (COZAAR) tablet 50 mg  50 mg Oral Daily Mayo Billy DO   50 mg at 09/03/20 1310       PRN Medications  acetaminophen **OR** acetaminophen, magnesium hydroxide, promethazine **OR** ondansetron    Objective  Most Recent Recorded Vitals  BP (!) 150/71   Pulse 72   Temp 98.3 °F (36.8 °C) (Oral)   Resp 18   Ht 5' (1.524 m)   Wt 162 lb (73.5 kg)   SpO2 99%   BMI 31.64 kg/m²   No intake/output data recorded. No intake/output data recorded. Physical Exam:  General: AAO to person/place/time/purpose, NAD, no labored breathing  Eyes: conjunctivae/corneas clear, sclera non icteric  Skin: color/texture/turgor normal, no rashes or lesions  Lungs: CTAB, no retractions/use of accessory muscles, no vocal fremitus, no rhonchi, no crackle, no rales  Heart: regular rate, regular rhythm, no murmur  Abdomen: soft, NT; bowel sounds normal; no masses,  no organomegaly  Extremities: atraumatic, no cyanosis, no edema  Neurologic: cranial nerves 2-12 grossly intact, no slurred speech. Most Recent Labs  Lab Results   Component Value Date    WBC 4.4 (L) 09/03/2020    HGB 12.9 09/03/2020    HCT 39.1 09/03/2020     09/03/2020     09/03/2020    K 4.6 09/03/2020     09/03/2020    CREATININE 0.8 09/03/2020    BUN 15 09/03/2020    CO2 25 09/03/2020    GLUCOSE 178 (H) 09/03/2020    ALT 37 (H) 09/03/2020    AST 39 (H) 09/03/2020    INR 1.0 09/03/2020    TSH 2.650 08/24/2020    LABA1C 6.1 (H) 08/24/2020    LABMICR <12.0 08/24/2020       XR CHEST PORTABLE   Final Result   No acute cardiopulmonary abnormality.       NM Cardiac Stress Test Nuclear Imaging    (Results Pending)       Assessment   Active Hospital Problems    Diagnosis    Chest pain [R07.9]     Priority: High    CAD (coronary artery disease) [I25.10]     Priority: Medium    Stented coronary artery - Cx [Z95.5] Priority: Medium    Hypothyroidism [E03.9]    Hyperlipidemia [E78.5]    Diabetes mellitus ([facility] [address] [E11.9]    Essential hypertension, benign [I10]       Plan  · Chest pain, h/o CAD: Observation. Telemetry. Cardiology following-plans for stress test this morning. CE's negative so far. ASA. Lipid panel  normal.  Symptoms possibly related to anxiety. · Follow labs  · DVT prophylaxis. · Please see orders for further management and care. ·  for discharge planning  · Discharge plan: TBD pending testing      Electronically signed by Velvet Councilman DO PGY1 on 9/4/2020 at 5:29 AM    Addendum: I have personally participated in a face-to-face history and physical exam on the date of service with the patient. I have discussed the case with the resident. I also participated in medical decision making with the resident on the date of service and I agree with all of the pertinent clinical information unless indicated in my editing of the note. I have reviewed and edited the note above based on my findings during my history, exam, and decision making on the same day of service. My additional thoughts:    Currently asymptomatic. Awaiting stress test.    Electronically signed by Mynor Wong DO on 9/4/2020 at 8:36 AM    I can be reached through [employer].

## 2020-09-04 NOTE — PROGRESS NOTES
Exercise stress test completed with physician, RNs, nuclear medicine staff, and patient wearing procedure masks.

## 2020-09-07 NOTE — DISCHARGE SUMMARY
Internal Medicine Discharge Summary    NAME: Amandeep Cheung :  1945  MRN:  [id number] Daksha Burnett MD    ADMITTED: 9/3/2020   DISCHARGED: 2020  3:22 PM    ADMITTING PHYSICIAN: Wenceslao    CONSULTANT(S):   IP CONSULT TO CARDIOLOGY  IP CONSULT TO INTERNAL MEDICINE     ADMITTING DIAGNOSIS:   Chest pain [R07.9]  Chest pain [R07.9]     Please see H&P for further details    DISCHARGE DIAGNOSES:   Active Hospital Problems    Diagnosis    Chest pain [R07.9]     Priority: High    CAD (coronary artery disease) [I25.10]     Priority: Medium    Stented coronary artery - Cx [Z95.5]     Priority: Medium    Hypothyroidism [E03.9]    Hyperlipidemia [E78.5]    Diabetes mellitus ([address] [E11.9]    Essential hypertension, benign [I10]       BRIEF HISTORY OF PRESENT ILLNESS: Amandeep Cheung is a 75 y.o. female patient of Wen Collazo MD who  has a past medical history of CAD (coronary artery disease), Diabetes mellitus ([address] Essential hypertension, benign, History of colon polyps, Hyperinsulinism, Hyperlipidemia, Hypertension, and Hypothyroidism. who originally had concerns including Chest Pain. at presentation on 9/3/2020, and was found to have Chest pain [R07.9]  Chest pain [R07.9] after workup. Please see H&P for further details. HOSPITAL COURSE:   The patient presented to the hospital with the chief complaint of CP. The patient was admitted to the hospital.     See progress notes for events of this hospital stay. BRIEF PHYSICAL EXAMINATION AND LABORATORIES ON DAY OF DISCHARGE:  VITALS:  BP (!) 145/70   Pulse 80   Temp 98.5 °F (36.9 °C) (Oral)   Resp 16   Ht 5' (1.524 m)   Wt 162 lb (73.5 kg)   SpO2 97%   BMI 31.64 kg/m²     Please see note from the same day.      LABS[de-identified]   WBC 4.4 (L) 2020     HGB 12.9 2020     HCT 39.1 2020      2020      2020     K 4.6 2020      2020     CREATININE 0.8 2020     BUN 15 2020     CO2 25 2020     GLUCOSE 178 (H) 2020     ALT 37 (H) 2020     AST 39 (H) 2020     INR 1.0 2020     TSH 2.650 2020     LABA1C 6.1 (H) 2020     LABMICR <12.0 2020     Lab Results   Component Value Date    LABA1C 6.1 (H) 2020    LABA1C 6.5 2019    LABA1C 6.2 2019     Lab Results   Component Value Date    INR 1.0 2020    INR 1.1 2019    INR 1.0 10/17/2015    PROTIME 11.8 2020    PROTIME 12.7 (H) 2019    PROTIME 11.1 10/17/2015      Lab Results   Component Value Date    TSH 2.650 2020    TSH 1.460 2019    TSH 1.90 2019     Lab Results   Component Value Date    TRIG 96 2020    TRIG 139 2020    TRIG 124 2019    HDL 40 2020    HDL 43 2020    HDL 35 2019    LDLCALC 63 2020    LDLCALC 65 2020    LDLCALC 60 2019     No results for input(s): MG in the last 72 hours. No results for input(s): CKTOTAL, CKMB, TROPONINI in the last 72 hours. No results for input(s): LACTA in the last 72 hours. IMAGING:  Xr Chest Portable    Result Date: 9/3/2020  Patient MRN: [id number] : 1945 Age:  75 years Gender: Female Order Date: 9/3/2020 10:09 AM Exam: XR CHEST PORTABLE Indication:   pain pain Comparison: 2019. FINDINGS: The lungs are clear. The heart is normal in size. The aorta is mildly tortuous. No pulmonary vascular congestion. No pneumothorax or pleural effusion. Evaluation of the bones reveals no fracture or destructive lesion. No acute cardiopulmonary abnormality.     Nm Cardiac Stress Test Nuclear Imaging    Result Date: 2020  Patient MRN:  [id number] : 1945 Age: 75 years Gender: Female Order Date:  2020 9:56 AM EXAM: NM MYOCARDIAL SPECT REST EXERCISE OR RX Number of Images: 10 imaging sequence and composite views INDICATION: Chest pain in hypertensive diabetic with hyperlipidemia and coronary artery disease with stent placement COMPARISON: Previous cardiac SPECT study July 18, 2019 reporting a left ventricular ejection fraction of 70% TECHNIQUE: 10.3 mCi of Tc-99m MIBI was injected intravenously at rest and cardiac SPECT images were performed. In addition, 30.1 mCi of Tc-99m MIBI was injected intravenously at maximum stress by using a Neymar protocol exercise treadmill stress. Stress SPECT images and gated study were performed. FINDINGS: Reported vital signs were 185/83 mmHg and 76 BPM at baseline, with patent measurements at 3 minutes, stage I of 199/83 mmHg and peak heart rate of 157 BPM at stage II, 5 minutes. 5 and 6 minutes recovery vital signs were 160/80 mmHg and 98 BPM, and 144/81 mmHg and 89 bpm, respectively. Initial  tomographic images show no significant motion artifact. Perfusion images demonstrate no reversible perfusion defect. Wall motion is within normal limits. The end diastolic volume is 33 ml. The end systolic volume is 7 ml. The estimated ejection fraction is 79 %. 1. No reversible perfusion defect 2. Ejection fraction is 79 %. 3. No significant wall motion abnormality     Declan David Digital Screen Bilateral    Result Date: 8/21/2020  EXAMINATION: SCREENING DIGITAL BILATERAL  MAMMOGRAM WITH TOMOSYNTHESIS, 8/21/2020 TECHNIQUE: Screening mammography of the bilateral breasts was performed with tomosynthesis. 2D standard and 3D tomosynthesis combination imaging performed through both breasts in the MLO and CC projection. Computer aided detection was utilized in the interpretation of this exam. COMPARISON: None HISTORY: Screening. FINDINGS: There are scattered areas of fibroglandular density. There is no new dominant mass, suspicious microcalcification, or area of architectural distortion. No mammographic evidence of malignancy. BIRADS: BIRADS - CATEGORY 1 Negative, no evidence of malignancy. Normal interval follow-up is recommended in 12 months.  OVERALL ASSESSMENT - NEGATIVE A letter of notification will be sent to the patient regarding the results. The [facility] recommend annual mammograms for women 40 years and older. MICROBIOLOGY:  BLOOD CX #1  No results for input(s): BC in the last 72 hours. BLOOD CX #2  No results for input(s): Mylinda Browner in the last 72 hours. TIP CULTURE  No results for input(s): CXCATHTIP in the last 72 hours. CULTURE, RESPIRATORY   No results for input(s): CULTRESP in the last 72 hours. RESPIRATORY SMEAR  No results for input(s): RESPSMEAR in the last 72 hours. DISPOSITION:  The patient's condition is fair.    The patient is being discharged to home    DISCHARGE MEDICATIONS:    Pattynain Jones   Home Medication Instructions DA    Printed on:20 1123   Medication Information                      amLODIPine (NORVASC) 5 MG tablet  Take 0.5 tablets by mouth daily             aspirin 81 MG chewable tablet  Take 81 mg by mouth every morning              metFORMIN (GLUCOPHAGE) 500 MG tablet  500 mg daily (with breakfast)              metoprolol succinate (TOPROL XL) 25 MG extended release tablet  Take 0.5 tablets by mouth 2 times daily 12.5mg BID             nitroGLYCERIN (NITROSTAT) 0.4 MG SL tablet  Place 1 tablet under the tongue every 5 minutes as needed for Chest pain             olmesartan (BENICAR) 20 MG tablet  Take 1 tablet by mouth every morning             omeprazole (PRILOSEC) 40 MG delayed release capsule  Take 1 capsule by mouth every morning             rosuvastatin (CRESTOR) 10 MG tablet  Take 1 tablet by mouth nightly             SYNTHROID 75 MCG tablet  Take 1 tablet by mouth every morning             vitamin C (ASCORBIC ACID) 500 MG tablet  Take 1,000 mg by mouth daily              vitamin D (CHOLECALCIFEROL) 125 MCG (5000 UT) CAPS capsule  Take 5,000 Units by mouth daily             vitamin E 400 UNIT capsule  Take 400 Units by mouth every morning                 Discharge Medication List as

## 2020-10-07 ENCOUNTER — OFFICE VISIT (OUTPATIENT)
Dept: FAMILY MEDICINE CLINIC | Age: 75
End: 2020-10-07
Payer: MEDICARE

## 2020-10-07 VITALS
SYSTOLIC BLOOD PRESSURE: 142 MMHG | BODY MASS INDEX: 31.44 KG/M2 | HEART RATE: 78 BPM | TEMPERATURE: 97.1 F | OXYGEN SATURATION: 98 % | WEIGHT: 161 LBS | DIASTOLIC BLOOD PRESSURE: 82 MMHG

## 2020-10-07 PROCEDURE — G8417 CALC BMI ABV UP PARAM F/U: HCPCS | Performed by: FAMILY MEDICINE

## 2020-10-07 PROCEDURE — 3017F COLORECTAL CA SCREEN DOC REV: CPT | Performed by: FAMILY MEDICINE

## 2020-10-07 PROCEDURE — 1123F ACP DISCUSS/DSCN MKR DOCD: CPT | Performed by: FAMILY MEDICINE

## 2020-10-07 PROCEDURE — 1090F PRES/ABSN URINE INCON ASSESS: CPT | Performed by: FAMILY MEDICINE

## 2020-10-07 PROCEDURE — 99214 OFFICE O/P EST MOD 30 MIN: CPT | Performed by: FAMILY MEDICINE

## 2020-10-07 PROCEDURE — G8400 PT W/DXA NO RESULTS DOC: HCPCS | Performed by: FAMILY MEDICINE

## 2020-10-07 PROCEDURE — 1036F TOBACCO NON-USER: CPT | Performed by: FAMILY MEDICINE

## 2020-10-07 PROCEDURE — G8482 FLU IMMUNIZE ORDER/ADMIN: HCPCS | Performed by: FAMILY MEDICINE

## 2020-10-07 PROCEDURE — 4040F PNEUMOC VAC/ADMIN/RCVD: CPT | Performed by: FAMILY MEDICINE

## 2020-10-07 PROCEDURE — G8427 DOCREV CUR MEDS BY ELIG CLIN: HCPCS | Performed by: FAMILY MEDICINE

## 2020-10-07 RX ORDER — ALPRAZOLAM 0.5 MG/1
TABLET ORAL
Qty: 15 TABLET | Refills: 0 | Status: SHIPPED | OUTPATIENT
Start: 2020-10-07 | End: 2021-01-07

## 2020-10-07 ASSESSMENT — ENCOUNTER SYMPTOMS
WHEEZING: 0
COUGH: 0
CONSTIPATION: 0
PHOTOPHOBIA: 0
CHEST TIGHTNESS: 0
DIARRHEA: 0
ABDOMINAL PAIN: 0
EYES NEGATIVE: 1
EYE REDNESS: 0
BLOOD IN STOOL: 0
VOMITING: 0
SHORTNESS OF BREATH: 0

## 2020-10-07 NOTE — PROGRESS NOTES
OFFICE NOTE    10/7/20  Name: Brady Lay  :1945   Sex:female   Age:74 y.o. SUBJECTIVE  Chief Complaint   Patient presents with    Follow-Up from Hospital    Dizziness     things got black and dizzy for a minute while standing at [employer]       Patient presents for hospital follow up. Was admitted to [facility] from 9/3- Dx chest pain. Her description sounds like panic attack to me. Cardiac workup was negative. Review of Systems   Constitutional: Negative for appetite change, fever and unexpected weight change. HENT: Negative for congestion, ear pain and postnasal drip. Eyes: Negative. Negative for photophobia, redness and visual disturbance. Respiratory: Negative for cough, chest tightness, shortness of breath and wheezing. Cardiovascular: Positive for palpitations. Negative for chest pain. Gastrointestinal: Negative for abdominal pain, blood in stool, constipation, diarrhea and vomiting. Endocrine: Negative for cold intolerance, polydipsia and polyuria. Genitourinary: Negative for dysuria and hematuria. Musculoskeletal: Negative for arthralgias, gait problem and joint swelling. Skin: Negative for rash and wound. Allergic/Immunologic: Negative for environmental allergies and food allergies. Neurological: Negative for dizziness, tremors, seizures, weakness, numbness and headaches. Hematological: Negative for adenopathy. Does not bruise/bleed easily. Psychiatric/Behavioral: Negative for behavioral problems, confusion, dysphoric mood and sleep disturbance. The patient is nervous/anxious.              Current Outpatient Medications:     ALPRAZolam (XANAX) 0.5 MG tablet, Take one tablet per day prn as rescue for painic disorder, Disp: 15 tablet, Rfl: 0    vitamin D (CHOLECALCIFEROL) 125 MCG (5000 UT) CAPS capsule, Take 5,000 Units by mouth daily, Disp: , Rfl:     metFORMIN (GLUCOPHAGE) 500 MG tablet, 500 mg daily (with breakfast) , Disp: , Rfl:     SYNTHROID 75 MCG tablet, Take 1 tablet by mouth every morning, Disp: 90 tablet, Rfl: 1    rosuvastatin (CRESTOR) 10 MG tablet, Take 1 tablet by mouth nightly, Disp: 90 tablet, Rfl: 3    omeprazole (PRILOSEC) 40 MG delayed release capsule, Take 1 capsule by mouth every morning, Disp: 90 capsule, Rfl: 3    olmesartan (BENICAR) 20 MG tablet, Take 1 tablet by mouth every morning, Disp: 90 tablet, Rfl: 3    metoprolol succinate (TOPROL XL) 25 MG extended release tablet, Take 0.5 tablets by mouth 2 times daily 12.5mg BID, Disp: 90 tablet, Rfl: 3    amLODIPine (NORVASC) 5 MG tablet, Take 0.5 tablets by mouth daily (Patient taking differently: Take 2.5 mg by mouth nightly ), Disp: 45 tablet, Rfl: 3    nitroGLYCERIN (NITROSTAT) 0.4 MG SL tablet, Place 1 tablet under the tongue every 5 minutes as needed for Chest pain, Disp: 25 tablet, Rfl: 3    vitamin C (ASCORBIC ACID) 500 MG tablet, Take 1,000 mg by mouth daily , Disp: , Rfl:     aspirin 81 MG chewable tablet, Take 81 mg by mouth every morning , Disp: , Rfl:     vitamin E 400 UNIT capsule, Take 400 Units by mouth every morning, Disp: , Rfl:   Allergies   Allergen Reactions    Epinephrine      \"makes me spacey\"    Other Nausea Only     unknown antibiotic       Past Medical History:   Diagnosis Date    CAD (coronary artery disease)     follows with Dr. Tai Walden yearly (next appt3/22/18)    Diabetes mellitus ([facility]     Essential hypertension, benign 3/26/2014    History of colon polyps     Hyperinsulinism     Hyperlipidemia     Hypertension     Hypothyroidism      Past Surgical History:   Procedure Laterality Date    APPENDECTOMY      CARDIAC CATHETERIZATION  5/2011    CARDIAC CATHETERIZATION  1-    Dr. Eusebio Ch  4/11    stent placement    CARPAL TUNNEL RELEASE Bilateral     CARPAL TUNNEL RELEASE Left 11/2013    COLONOSCOPY      COLONOSCOPY  07/02/2012    CORONARY ANGIOPLASTY      DIAGNOSTIC CARDIAC CATH LAB PROCEDURE      ENDOSCOPY, COLON,

## 2020-11-09 LAB
ALBUMIN SERPL-MCNC: NORMAL G/DL
ALP BLD-CCNC: NORMAL U/L
ALT SERPL-CCNC: NORMAL U/L
ANION GAP SERPL CALCULATED.3IONS-SCNC: NORMAL MMOL/L
AST SERPL-CCNC: NORMAL U/L
AVERAGE GLUCOSE: NORMAL
BILIRUB SERPL-MCNC: NORMAL MG/DL
BUN BLDV-MCNC: NORMAL MG/DL
CALCIUM SERPL-MCNC: NORMAL MG/DL
CHLORIDE BLD-SCNC: NORMAL MMOL/L
CO2: NORMAL
CREAT SERPL-MCNC: NORMAL MG/DL
GFR CALCULATED: NORMAL
GLUCOSE BLD-MCNC: NORMAL MG/DL
HBA1C MFR BLD: 6.3 %
POTASSIUM SERPL-SCNC: NORMAL MMOL/L
SODIUM BLD-SCNC: NORMAL MMOL/L
TOTAL PROTEIN: NORMAL

## 2020-11-19 ENCOUNTER — OFFICE VISIT (OUTPATIENT)
Dept: FAMILY MEDICINE CLINIC | Age: 75
End: 2020-11-19
Payer: MEDICARE

## 2020-11-19 VITALS
BODY MASS INDEX: 32.07 KG/M2 | OXYGEN SATURATION: 94 % | DIASTOLIC BLOOD PRESSURE: 72 MMHG | WEIGHT: 164.2 LBS | SYSTOLIC BLOOD PRESSURE: 134 MMHG | TEMPERATURE: 97.5 F | HEART RATE: 88 BPM

## 2020-11-19 PROCEDURE — 1090F PRES/ABSN URINE INCON ASSESS: CPT | Performed by: FAMILY MEDICINE

## 2020-11-19 PROCEDURE — 1123F ACP DISCUSS/DSCN MKR DOCD: CPT | Performed by: FAMILY MEDICINE

## 2020-11-19 PROCEDURE — G8482 FLU IMMUNIZE ORDER/ADMIN: HCPCS | Performed by: FAMILY MEDICINE

## 2020-11-19 PROCEDURE — G8427 DOCREV CUR MEDS BY ELIG CLIN: HCPCS | Performed by: FAMILY MEDICINE

## 2020-11-19 PROCEDURE — 20605 DRAIN/INJ JOINT/BURSA W/O US: CPT | Performed by: FAMILY MEDICINE

## 2020-11-19 PROCEDURE — G8417 CALC BMI ABV UP PARAM F/U: HCPCS | Performed by: FAMILY MEDICINE

## 2020-11-19 PROCEDURE — 4040F PNEUMOC VAC/ADMIN/RCVD: CPT | Performed by: FAMILY MEDICINE

## 2020-11-19 PROCEDURE — 1036F TOBACCO NON-USER: CPT | Performed by: FAMILY MEDICINE

## 2020-11-19 PROCEDURE — 99214 OFFICE O/P EST MOD 30 MIN: CPT | Performed by: FAMILY MEDICINE

## 2020-11-19 PROCEDURE — G8400 PT W/DXA NO RESULTS DOC: HCPCS | Performed by: FAMILY MEDICINE

## 2020-11-19 RX ORDER — AMLODIPINE BESYLATE 5 MG/1
2.5 TABLET ORAL DAILY
Qty: 45 TABLET | Refills: 3 | Status: SHIPPED
Start: 2020-11-19 | End: 2021-12-09 | Stop reason: SDUPTHER

## 2020-11-19 RX ORDER — LIDOCAINE HYDROCHLORIDE 10 MG/ML
20 INJECTION, SOLUTION INFILTRATION; PERINEURAL ONCE
Status: COMPLETED | OUTPATIENT
Start: 2020-11-19 | End: 2020-11-19

## 2020-11-19 RX ORDER — ROSUVASTATIN CALCIUM 10 MG/1
10 TABLET, COATED ORAL NIGHTLY
Qty: 90 TABLET | Refills: 3 | Status: SHIPPED
Start: 2020-11-19 | End: 2021-12-09 | Stop reason: SDUPTHER

## 2020-11-19 RX ORDER — LEVOTHYROXINE SODIUM 75 MCG
75 TABLET ORAL EVERY MORNING
Qty: 90 TABLET | Refills: 1 | Status: SHIPPED
Start: 2020-11-19 | End: 2021-06-03 | Stop reason: SDUPTHER

## 2020-11-19 RX ORDER — METHYLPREDNISOLONE ACETATE 40 MG/ML
40 INJECTION, SUSPENSION INTRA-ARTICULAR; INTRALESIONAL; INTRAMUSCULAR; SOFT TISSUE ONCE
Status: COMPLETED | OUTPATIENT
Start: 2020-11-19 | End: 2020-11-19

## 2020-11-19 RX ORDER — OLMESARTAN MEDOXOMIL 20 MG/1
20 TABLET ORAL EVERY MORNING
Qty: 90 TABLET | Refills: 3 | Status: SHIPPED
Start: 2020-11-19 | End: 2021-06-03 | Stop reason: SDUPTHER

## 2020-11-19 RX ORDER — OMEPRAZOLE 40 MG/1
40 CAPSULE, DELAYED RELEASE ORAL EVERY MORNING
Qty: 90 CAPSULE | Refills: 3 | Status: SHIPPED
Start: 2020-11-19 | End: 2021-12-09 | Stop reason: SDUPTHER

## 2020-11-19 RX ORDER — METOPROLOL SUCCINATE 25 MG/1
12.5 TABLET, EXTENDED RELEASE ORAL 2 TIMES DAILY
Qty: 90 TABLET | Refills: 3 | Status: SHIPPED
Start: 2020-11-19 | End: 2021-12-09 | Stop reason: SDUPTHER

## 2020-11-19 RX ADMIN — METHYLPREDNISOLONE ACETATE 40 MG: 40 INJECTION, SUSPENSION INTRA-ARTICULAR; INTRALESIONAL; INTRAMUSCULAR; SOFT TISSUE at 09:28

## 2020-11-19 RX ADMIN — LIDOCAINE HYDROCHLORIDE 20 ML: 10 INJECTION, SOLUTION INFILTRATION; PERINEURAL at 09:28

## 2020-11-19 ASSESSMENT — ENCOUNTER SYMPTOMS
VOMITING: 0
EYE REDNESS: 0
EYES NEGATIVE: 1
DIARRHEA: 0
CONSTIPATION: 0
PHOTOPHOBIA: 0
BLOOD IN STOOL: 0
BACK PAIN: 1
ABDOMINAL PAIN: 0
WHEEZING: 0
CHEST TIGHTNESS: 0
COUGH: 0

## 2020-11-19 NOTE — PROGRESS NOTES
OFFICE NOTE    20  Name: Bryanna Stein  :1945   Sex:female   Age:75 y.o. SUBJECTIVE  Chief Complaint   Patient presents with    Hypertension    Hyperlipidemia    Hypothyroidism    Hip Pain     Right, shooting into RLE       Patient presents for routine follow up. Has complaints of R hip pain, shooting into RLE. Requires routine medication refills. Review of Systems   Constitutional: Negative for appetite change, fever and unexpected weight change. HENT: Negative for congestion, ear pain and postnasal drip. Eyes: Negative. Respiratory: Negative for cough, chest tightness and wheezing. Cardiovascular: Negative for chest pain and palpitations. Gastrointestinal: Negative for abdominal pain, blood in stool, constipation, diarrhea and vomiting. Endocrine: Negative for cold intolerance, polydipsia and polyuria. Genitourinary: Negative for dysuria and hematuria. Musculoskeletal: Negative for arthralgias, gait problem and joint swelling. Skin: Negative for rash and wound. Allergic/Immunologic: Negative for environmental allergies and food allergies. Neurological: Negative for dizziness, tremors, weakness and headaches. Hematological: Negative for adenopathy. Does not bruise/bleed easily. Psychiatric/Behavioral: Negative for behavioral problems, confusion, dysphoric mood and sleep disturbance.             Current Outpatient Medications:     ALPRAZolam (XANAX) 0.5 MG tablet, Take one tablet per day prn as rescue for painic disorder, Disp: 15 tablet, Rfl: 0    vitamin D (CHOLECALCIFEROL) 125 MCG (5000 UT) CAPS capsule, Take 5,000 Units by mouth daily, Disp: , Rfl:     metFORMIN (GLUCOPHAGE) 500 MG tablet, 500 mg daily (with breakfast) , Disp: , Rfl:     SYNTHROID 75 MCG tablet, Take 1 tablet by mouth every morning, Disp: 90 tablet, Rfl: 1    rosuvastatin (CRESTOR) 10 MG tablet, Take 1 tablet by mouth nightly, Disp: 90 tablet, Rfl: 3    omeprazole (PRILOSEC) 40 MG delayed release capsule, Take 1 capsule by mouth every morning, Disp: 90 capsule, Rfl: 3    olmesartan (BENICAR) 20 MG tablet, Take 1 tablet by mouth every morning, Disp: 90 tablet, Rfl: 3    metoprolol succinate (TOPROL XL) 25 MG extended release tablet, Take 0.5 tablets by mouth 2 times daily 12.5mg BID, Disp: 90 tablet, Rfl: 3    amLODIPine (NORVASC) 5 MG tablet, Take 0.5 tablets by mouth daily (Patient taking differently: Take 2.5 mg by mouth nightly ), Disp: 45 tablet, Rfl: 3    nitroGLYCERIN (NITROSTAT) 0.4 MG SL tablet, Place 1 tablet under the tongue every 5 minutes as needed for Chest pain, Disp: 25 tablet, Rfl: 3    vitamin E 400 UNIT capsule, Take 400 Units by mouth every morning, Disp: , Rfl:     vitamin C (ASCORBIC ACID) 500 MG tablet, Take 1,000 mg by mouth daily , Disp: , Rfl:     aspirin 81 MG chewable tablet, Take 81 mg by mouth every morning , Disp: , Rfl:   Allergies   Allergen Reactions    Epinephrine      \"makes me spacey\"    Other Nausea Only     unknown antibiotic       Past Medical History:   Diagnosis Date    CAD (coronary artery disease)     follows with Dr. Anjum Lomas yearly (next appt3/22/18)    Diabetes mellitus ([facility]     Essential hypertension, benign 3/26/2014    History of colon polyps     Hyperinsulinism     Hyperlipidemia     Hypertension     Hypothyroidism      Past Surgical History:   Procedure Laterality Date    APPENDECTOMY      CARDIAC CATHETERIZATION  5/2011    CARDIAC CATHETERIZATION  1-    Dr. Freddy Walton  4/11    stent placement    CARPAL TUNNEL RELEASE Bilateral     CARPAL TUNNEL RELEASE Left 11/2013    COLONOSCOPY      COLONOSCOPY  07/02/2012    CORONARY ANGIOPLASTY      DIAGNOSTIC CARDIAC CATH LAB PROCEDURE      ENDOSCOPY, COLON, DIAGNOSTIC      EYE SURGERY Right     cataract    HYSTERECTOMY      HYSTERECTOMY, TOTAL ABDOMINAL      POLYPECTOMY  2012    PTCA  2011    WITH STENT 2 TIMES     UPPER GASTROINTESTINAL ENDOSCOPY  2012     Family History   Problem Relation Age of Onset    Heart Attack Mother     Heart Attack Father     Heart Surgery Brother     Heart Attack Sister      Social History     Tobacco History     Smoking Status  Former Smoker Smoking Start Date  11/12/1959 Quit date  11/12/1974 Smoking Frequency  0.5 packs/day for 15 years (7.5 pk yrs)    Smoking Tobacco Type  Cigarettes    Smokeless Tobacco Use  Never Used          Alcohol History     Alcohol Use Status  No          Drug Use     Drug Use Status  No          Sexual Activity     Sexually Active  Not Currently Partners  Male

## 2020-11-19 NOTE — PROGRESS NOTES
OFFICE NOTE    20  Name: Leo Estrada  :1945   Sex:female   Age:75 y.o. SUBJECTIVE  Chief Complaint   Patient presents with    Hypertension    Hyperlipidemia    Hypothyroidism    Hip Pain     Right, shooting into RLE       HPI In for checkup and refills. Having LBP and RLE sciatica for last couple mos. Started when she was weeding her garden    Review of Systems   Constitutional: Negative for appetite change, fever and unexpected weight change. HENT: Negative for congestion, ear pain and postnasal drip. Eyes: Negative. Negative for photophobia, redness and visual disturbance. Respiratory: Negative for cough, chest tightness and wheezing. Cardiovascular: Negative for chest pain and palpitations. Gastrointestinal: Negative for abdominal pain, blood in stool, constipation, diarrhea and vomiting. Endocrine: Negative for cold intolerance, polydipsia and polyuria. Genitourinary: Negative for dysuria and hematuria. Musculoskeletal: Positive for back pain. Negative for arthralgias, gait problem and joint swelling. Skin: Negative for rash and wound. Allergic/Immunologic: Negative for environmental allergies and food allergies. Neurological: Positive for numbness. Negative for dizziness, tremors, seizures, weakness and headaches. Sciatica L-5/S-1   Hematological: Negative for adenopathy. Does not bruise/bleed easily. Psychiatric/Behavioral: Negative for behavioral problems, confusion, dysphoric mood and sleep disturbance. All other systems reviewed and are negative.            Current Outpatient Medications:     amLODIPine (NORVASC) 5 MG tablet, Take 0.5 tablets by mouth daily, Disp: 45 tablet, Rfl: 3    metoprolol succinate (TOPROL XL) 25 MG extended release tablet, Take 0.5 tablets by mouth 2 times daily 12.5mg BID, Disp: 90 tablet, Rfl: 3    olmesartan (BENICAR) 20 MG tablet, Take 1 tablet by mouth every morning, Disp: 90 tablet, Rfl: 3    omeprazole (PRILOSEC) 40 MG delayed release capsule, Take 1 capsule by mouth every morning, Disp: 90 capsule, Rfl: 3    rosuvastatin (CRESTOR) 10 MG tablet, Take 1 tablet by mouth nightly, Disp: 90 tablet, Rfl: 3    SYNTHROID 75 MCG tablet, Take 1 tablet by mouth every morning, Disp: 90 tablet, Rfl: 1    ALPRAZolam (XANAX) 0.5 MG tablet, Take one tablet per day prn as rescue for painic disorder, Disp: 15 tablet, Rfl: 0    vitamin D (CHOLECALCIFEROL) 125 MCG (5000 UT) CAPS capsule, Take 5,000 Units by mouth daily, Disp: , Rfl:     metFORMIN (GLUCOPHAGE) 500 MG tablet, 500 mg daily (with breakfast) , Disp: , Rfl:     nitroGLYCERIN (NITROSTAT) 0.4 MG SL tablet, Place 1 tablet under the tongue every 5 minutes as needed for Chest pain, Disp: 25 tablet, Rfl: 3    vitamin E 400 UNIT capsule, Take 400 Units by mouth every morning, Disp: , Rfl:     vitamin C (ASCORBIC ACID) 500 MG tablet, Take 1,000 mg by mouth daily , Disp: , Rfl:     aspirin 81 MG chewable tablet, Take 81 mg by mouth every morning , Disp: , Rfl:   Allergies   Allergen Reactions    Epinephrine      \"makes me spacey\"    Other Nausea Only     unknown antibiotic       Past Medical History:   Diagnosis Date    CAD (coronary artery disease)     follows with  [facility] yearly (next appt3/22/18)    Diabetes mellitus ([facility] [address]     Essential hypertension, benign 3/26/2014    History of colon polyps     Hyperinsulinism     Hyperlipidemia     Hypertension     Hypothyroidism      Past Surgical History:   Procedure Laterality Date    APPENDECTOMY      CARDIAC CATHETERIZATION  5/2011    CARDIAC CATHETERIZATION  1-    Dr. Farrukh Mack  4/11    stent placement    CARPAL TUNNEL RELEASE Bilateral     CARPAL TUNNEL RELEASE Left 11/2013    COLONOSCOPY      COLONOSCOPY  07/02/2012    CORONARY ANGIOPLASTY      DIAGNOSTIC CARDIAC CATH LAB PROCEDURE      ENDOSCOPY, COLON, DIAGNOSTIC      EYE SURGERY Right     cataract    HYSTERECTOMY      HYSTERECTOMY, TOTAL ABDOMINAL      POLYPECTOMY  2012    PTCA  2011    WITH STENT 2 TIMES     UPPER GASTROINTESTINAL ENDOSCOPY  2012     Family History   Problem Relation Age of Onset    Heart Attack Mother     Heart Attack Father     Heart Surgery Brother     Heart Attack Sister      Social History     Tobacco History     Smoking Status  Former Smoker Smoking Start Date  11/12/1959 Quit date  11/12/1974 Smoking Frequency  0.5 packs/day for 15 years (7.5 pk yrs)    Smoking Tobacco Type  Cigarettes    Smokeless Tobacco Use  Never Used          Alcohol History     Alcohol Use Status  No          Drug Use     Drug Use Status  No          Sexual Activity     Sexually Active  Not Currently Partners  Male                OBJECTIVE  Vitals:    11/19/20 0828   BP: 134/72   Site: Left Upper Arm   Position: Sitting   Pulse: 88   Temp: 97.5 °F (36.4 °C)   TempSrc: Temporal   SpO2: 94%   Weight: 164 lb 3.2 oz (74.5 kg)        Body mass index is 32.07 kg/m². Orders Placed This Encounter   Procedures    XR LUMBAR SPINE (MIN 4 VIEWS)     Standing Status:   Future     Standing Expiration Date:   11/19/2021    OR ARTHROCENTESIS ASPIR&/INJ INTERM JT/BURS W/O US        EXAM   Physical Exam  Vitals signs and nursing note reviewed. Constitutional:       Appearance: Normal appearance. She is well-developed. Comments: overweight   HENT:      Right Ear: Tympanic membrane, ear canal and external ear normal.      Left Ear: Tympanic membrane, ear canal and external ear normal.      Nose: Nose normal.      Mouth/Throat:      Pharynx: Oropharynx is clear. Eyes:      General: No scleral icterus. Conjunctiva/sclera: Conjunctivae normal.      Pupils: Pupils are equal, round, and reactive to light. Neck:      Musculoskeletal: Normal range of motion and neck supple. Thyroid: No thyroid mass or thyromegaly. Vascular: No carotid bruit or JVD.       Trachea: Trachea normal.   Cardiovascular:      Rate and Rhythm: Normal rate and regular rhythm. Pulses: Normal pulses. Heart sounds: Normal heart sounds. No murmur. No gallop. Pulmonary:      Effort: Pulmonary effort is normal.      Breath sounds: Normal breath sounds. No wheezing, rhonchi or rales. Abdominal:      General: Bowel sounds are normal. There is no distension. Palpations: Abdomen is soft. There is no mass. Tenderness: There is no abdominal tenderness. There is no guarding. Hernia: No hernia is present. Musculoskeletal: Normal range of motion. General: No swelling. Right lower leg: No edema. Left lower leg: No edema. Comments: Pain superior portion Right SI joint. Negative SLRs hip ROM ok   Lymphadenopathy:      Cervical: No cervical adenopathy. Skin:     General: Skin is warm and dry. Coloration: Skin is not jaundiced. Findings: No bruising or rash. Neurological:      General: No focal deficit present. Mental Status: She is alert and oriented to person, place, and time. Sensory: Sensory deficit present. Motor: No weakness or abnormal muscle tone. Coordination: Coordination normal.      Gait: Gait normal.   Psychiatric:         Behavior: Behavior normal.           Camila was seen today for hypertension, hyperlipidemia, hypothyroidism and hip pain. Diagnoses and all orders for this visit:    Sacroiliitis ([address]  -     MS ARTHROCENTESIS ASPIR&/INJ INTERM JT/BURS W/O US  -     methylPREDNISolone acetate (DEPO-MEDROL) injection 40 mg  -     lidocaine 1 % injection 20 mL  -     Cancel: XR LUMBAR SPINE (MIN 4 VIEWS); Future  -     XR LUMBAR SPINE (MIN 4 VIEWS); Future  Injected proximal right SI joint. Thermocare pad. Routine x-ray ordered. May need to consider MRI  Anxiety  Has not really used any of the rescue benzo which is just as well. Seems to be managing. Very cautious about COVID  Essential hypertension, benign  -     amLODIPine (NORVASC) 5 MG tablet;  Take 0.5 tablets by mouth daily  -     metoprolol succinate (TOPROL XL) 25 MG extended release tablet; Take 0.5 tablets by mouth 2 times daily 12.5mg BID  -     olmesartan (BENICAR) 20 MG tablet; Take 1 tablet by mouth every morning  Well controlled, no changes made  Gastroesophageal reflux disease without esophagitis  -     omeprazole (PRILOSEC) 40 MG delayed release capsule; Take 1 capsule by mouth every morning   Doing well. Has concurrent CAD which makes diagnosis challenging  Other hyperlipidemia  -     rosuvastatin (CRESTOR) 10 MG tablet; Take 1 tablet by mouth nightly    Hypothyroidism due to Hashimoto's thyroiditis  -     SYNTHROID 75 MCG tablet; Take 1 tablet by mouth every morning    Sciatica of right side  -     methylPREDNISolone acetate (DEPO-MEDROL) injection 40 mg  -     Cancel: XR LUMBAR SPINE (MIN 4 VIEWS); Future  -     XR LUMBAR SPINE (MIN 4 VIEWS); Future    See above note      Return in about 6 months (around 5/19/2021).     Electronically signed by Vito Almaguer MD on 11/19/20 at 8:36 AM EST

## 2020-11-24 ENCOUNTER — OFFICE VISIT (OUTPATIENT)
Dept: PRIMARY CARE CLINIC | Age: 75
End: 2020-11-24
Payer: MEDICARE

## 2020-11-24 ENCOUNTER — TELEPHONE (OUTPATIENT)
Dept: FAMILY MEDICINE CLINIC | Age: 75
End: 2020-11-24

## 2020-11-24 VITALS
BODY MASS INDEX: 32.2 KG/M2 | WEIGHT: 164 LBS | OXYGEN SATURATION: 98 % | RESPIRATION RATE: 20 BRPM | HEART RATE: 90 BPM | TEMPERATURE: 98.3 F | DIASTOLIC BLOOD PRESSURE: 84 MMHG | SYSTOLIC BLOOD PRESSURE: 140 MMHG | HEIGHT: 60 IN

## 2020-11-24 DIAGNOSIS — Z20.822 ENCOUNTER FOR LABORATORY TESTING FOR COVID-19 VIRUS: ICD-10-CM

## 2020-11-24 PROCEDURE — 99202 OFFICE O/P NEW SF 15 MIN: CPT | Performed by: CLINICAL NURSE SPECIALIST

## 2020-11-24 RX ORDER — METHYLPREDNISOLONE 4 MG/1
TABLET ORAL
Qty: 21 TABLET | Refills: 0 | Status: SHIPPED | OUTPATIENT
Start: 2020-11-24 | End: 2020-11-30

## 2020-11-24 RX ORDER — AZITHROMYCIN 250 MG/1
250 TABLET, FILM COATED ORAL SEE ADMIN INSTRUCTIONS
Qty: 6 TABLET | Refills: 0 | Status: SHIPPED | OUTPATIENT
Start: 2020-11-24 | End: 2020-11-29

## 2020-11-24 RX ORDER — GUAIFENESIN 600 MG/1
600 TABLET, EXTENDED RELEASE ORAL 2 TIMES DAILY
Qty: 30 TABLET | Refills: 0 | Status: SHIPPED | OUTPATIENT
Start: 2020-11-24 | End: 2020-12-09

## 2020-11-24 ASSESSMENT — ENCOUNTER SYMPTOMS
EYES NEGATIVE: 1
GASTROINTESTINAL NEGATIVE: 1
ALLERGIC/IMMUNOLOGIC NEGATIVE: 1
COUGH: 1

## 2020-11-24 NOTE — PROGRESS NOTES
motion and neck supple. No neck rigidity or muscular tenderness. Vascular: No carotid bruit. Cardiovascular:      Rate and Rhythm: Normal rate and regular rhythm. Pulses: Normal pulses. Heart sounds: Normal heart sounds. No murmur. No friction rub. No gallop. Pulmonary:      Effort: Pulmonary effort is normal. No respiratory distress. Breath sounds: Normal breath sounds. No stridor. No wheezing, rhonchi or rales. Chest:      Chest wall: No tenderness. Abdominal:      General: Abdomen is flat. Bowel sounds are normal. There is no distension. Palpations: Abdomen is soft. There is no mass. Tenderness: There is no abdominal tenderness. There is no right CVA tenderness, left CVA tenderness, guarding or rebound. Hernia: No hernia is present. Musculoskeletal: Normal range of motion. General: No swelling, tenderness, deformity or signs of injury. Right lower leg: No edema. Left lower leg: No edema. Lymphadenopathy:      Cervical: Cervical adenopathy present. Skin:     General: Skin is warm and dry. Capillary Refill: Capillary refill takes less than 2 seconds. Coloration: Skin is not jaundiced or pale. Findings: No bruising, erythema, lesion or rash. Neurological:      General: No focal deficit present. Mental Status: She is alert and oriented to person, place, and time. Cranial Nerves: No cranial nerve deficit. Sensory: No sensory deficit. Motor: No weakness. Coordination: Coordination normal.      Gait: Gait normal.      Deep Tendon Reflexes: Reflexes normal.   Psychiatric:         Mood and Affect: Mood normal.         Behavior: Behavior normal.         Thought Content:  Thought content normal.         Judgment: Judgment normal.       BP (!) 140/84   Pulse 90   Temp 98.3 °F (36.8 °C) (Oral)   Resp 20   Ht 5' (1.524 m)   Wt 164 lb (74.4 kg)   SpO2 98%   BMI 32.03 kg/m²     Assessment:       Diagnosis Orders 1. Encounter for laboratory testing for COVID-19 virus  COVID-19 Ambulatory   2. Acute sinusitis, recurrence not specified, unspecified location  azithromycin (ZITHROMAX) 250 MG tablet    methylPREDNISolone (MEDROL DOSEPACK) 4 MG tablet    guaiFENesin (MUCINEX) 600 MG extended release tablet           Plan:      Reviewed medications, allergies and past medical history. Patient was nasally swabbed for covid. Advised patient to quarantine until covid results are available. Zpack, medrol dose pack and mucinex were escribed to the pharmacy. Patient should follow up with the flu clinic for any concerns. Go to the ER with any worsening of her condition.         Clayton Langston, APRN - CNP

## 2020-11-25 LAB
SARS-COV-2: DETECTED
SOURCE: ABNORMAL

## 2020-11-25 NOTE — TELEPHONE ENCOUNTER
Spoke with Camila, she would like to know what to do about continued sciatic pain. States injection helped but is now back.  Xray showed OA

## 2020-11-25 NOTE — TELEPHONE ENCOUNTER
We can send her to pain management [facility] or [facility] if she wants. What is she taking for pain?

## 2020-11-25 NOTE — TELEPHONE ENCOUNTER
Patient informed. Does not want referral. Has not been taking anything for pain.  Will attempt tylenol

## 2020-12-07 ENCOUNTER — TELEPHONE (OUTPATIENT)
Dept: FAMILY MEDICINE CLINIC | Age: 75
End: 2020-12-07

## 2020-12-08 RX ORDER — BENZONATATE 100 MG/1
100-200 CAPSULE ORAL 3 TIMES DAILY PRN
Qty: 30 CAPSULE | Refills: 0 | Status: SHIPPED | OUTPATIENT
Start: 2020-12-08 | End: 2020-12-15

## 2020-12-08 RX ORDER — ALBUTEROL SULFATE 90 UG/1
2 AEROSOL, METERED RESPIRATORY (INHALATION) 4 TIMES DAILY PRN
Qty: 1 INHALER | Refills: 5 | Status: SHIPPED
Start: 2020-12-08 | End: 2021-01-25

## 2020-12-08 NOTE — TELEPHONE ENCOUNTER
She has not had tessalon  [place].  did&they did not work. The cough at night keeps her up. She has already gone thru 2 bottles of mucinex.  The [place] [facility] nurse is whom is suggested she get a cough med with codienmariaa from

## 2020-12-09 NOTE — TELEPHONE ENCOUNTER
I want her to try tessalon and albuterol HFA inhaler first. The codeine is considered an opiate and requires face to face.

## 2021-01-12 ENCOUNTER — TELEPHONE (OUTPATIENT)
Dept: FAMILY MEDICINE CLINIC | Age: 76
End: 2021-01-12

## 2021-01-12 NOTE — TELEPHONE ENCOUNTER
Patient calling she had covid in nov. Do feel she should get covid vaccine when available ? Ok to leave her a detailed message.

## 2021-01-25 ENCOUNTER — OFFICE VISIT (OUTPATIENT)
Dept: PRIMARY CARE CLINIC | Age: 76
End: 2021-01-25
Payer: MEDICARE

## 2021-01-25 VITALS
WEIGHT: 156 LBS | SYSTOLIC BLOOD PRESSURE: 140 MMHG | OXYGEN SATURATION: 98 % | TEMPERATURE: 97.5 F | HEART RATE: 113 BPM | DIASTOLIC BLOOD PRESSURE: 92 MMHG | BODY MASS INDEX: 30.63 KG/M2 | HEIGHT: 60 IN

## 2021-01-25 DIAGNOSIS — R52 GENERALIZED BODY ACHES: ICD-10-CM

## 2021-01-25 DIAGNOSIS — J00 ACUTE RHINITIS: Primary | ICD-10-CM

## 2021-01-25 DIAGNOSIS — R09.82 POST-NASAL DRAINAGE: ICD-10-CM

## 2021-01-25 DIAGNOSIS — J02.9 PHARYNGITIS, UNSPECIFIED ETIOLOGY: ICD-10-CM

## 2021-01-25 PROCEDURE — 1090F PRES/ABSN URINE INCON ASSESS: CPT | Performed by: NURSE PRACTITIONER

## 2021-01-25 PROCEDURE — G8482 FLU IMMUNIZE ORDER/ADMIN: HCPCS | Performed by: NURSE PRACTITIONER

## 2021-01-25 PROCEDURE — G8417 CALC BMI ABV UP PARAM F/U: HCPCS | Performed by: NURSE PRACTITIONER

## 2021-01-25 PROCEDURE — 99213 OFFICE O/P EST LOW 20 MIN: CPT | Performed by: NURSE PRACTITIONER

## 2021-01-25 PROCEDURE — 1036F TOBACCO NON-USER: CPT | Performed by: NURSE PRACTITIONER

## 2021-01-25 PROCEDURE — G8400 PT W/DXA NO RESULTS DOC: HCPCS | Performed by: NURSE PRACTITIONER

## 2021-01-25 PROCEDURE — 4040F PNEUMOC VAC/ADMIN/RCVD: CPT | Performed by: NURSE PRACTITIONER

## 2021-01-25 PROCEDURE — G8427 DOCREV CUR MEDS BY ELIG CLIN: HCPCS | Performed by: NURSE PRACTITIONER

## 2021-01-25 PROCEDURE — 1123F ACP DISCUSS/DSCN MKR DOCD: CPT | Performed by: NURSE PRACTITIONER

## 2021-01-25 RX ORDER — BENZONATATE 100 MG/1
CAPSULE ORAL
COMMUNITY
Start: 2020-12-09 | End: 2022-06-08

## 2021-01-25 RX ORDER — AMOXICILLIN 500 MG/1
CAPSULE ORAL
COMMUNITY
Start: 2021-01-13 | End: 2022-06-08

## 2021-01-25 RX ORDER — FLUTICASONE PROPIONATE 50 MCG
2 SPRAY, SUSPENSION (ML) NASAL DAILY
Qty: 1 BOTTLE | Refills: 0 | Status: SHIPPED
Start: 2021-01-25 | End: 2022-06-08

## 2021-01-25 NOTE — PROGRESS NOTES
Chief Complaint   Congestion (PND started saturday) and Pharyngitis      History of Present Illness   Source of history provided by:  patient. Guille Mathias is a 67 y.o. old female who presents to the flu clinic with complaints of Generalized Body Aches, Pharyngitis, Rhinorrhea and Post nasal drip x 2 days. States symptoms have stayed the same since onset. Has been taking no OTC medications. Denies any Fever, Cough, Shortness of breath, Nausea, Vomiting, Chest Pain, LE Edema, Abdominal Pain or Rash. Denies any hx of asthma, COPD, emphysema or pneumonia. ROS   Pertinent positives and negatives are stated within HPI, all other systems reviewed and are negative. Past Medical History:  has a past medical history of CAD (coronary artery disease), Diabetes mellitus ([facility] [address] Essential hypertension, benign, History of colon polyps, Hyperinsulinism, Hyperlipidemia, Hypertension, and Hypothyroidism. Past Surgical History:  has a past surgical history that includes Cardiac surgery (4/11); Cardiac catheterization (5/2011); Diagnostic Cardiac Cath Lab Procedure; Coronary angioplasty; Colonoscopy; Endoscopy, colon, diagnostic; Hysterectomy; Appendectomy; Colonoscopy (07/02/2012); Carpal tunnel release (Bilateral); Cardiac catheterization (1-); eye surgery (Right); Percutaneous Transluminal Coronary Angio (2011); Hysterectomy, total abdominal; Upper gastrointestinal endoscopy (2012); polypectomy (2012); and Carpal tunnel release (Left, 11/2013). Social History:  reports that she quit smoking about 46 years ago. Her smoking use included cigarettes. She started smoking about 61 years ago. She has a 7.50 pack-year smoking history. She has never used smokeless tobacco. She reports that she does not drink alcohol or use drugs. Family History: family history includes Heart Attack in her father, mother, and sister; Heart Surgery in her brother.   Allergies: Epinephrine and Other    Physical Exam Increase fluids and rest. Symptomatic relief discussed including Tylenol prn pain/fever. Schedule f/u with PCP in 7-10 days if symptoms persist. ED sooner if symptoms worsen or change. ED immediately with high or refractory fever, progressive SOB, dyspnea, CP, calf pain/swelling, shaking chills, vomiting, abdominal pain, lethargy, flank pain, or decreased urinary output. Pt verbalizes understanding and is in agreement with plan of care. All questions answered. GEORGE Irby - NP    This visit was provided as a focused evaluation during the [place] -19 pandemic/national emergency. A comprehensive review of all previous patient history and testing was not conducted. Pertinent findings were elicited during the visit. *NOTE: This report was transcribed using voice recognition software. Every effort was made to ensure accuracy; however, inadvertent computerized transcription errors may be present.

## 2021-02-16 ENCOUNTER — OFFICE VISIT (OUTPATIENT)
Dept: FAMILY MEDICINE CLINIC | Age: 76
End: 2021-02-16
Payer: MEDICARE

## 2021-02-16 VITALS
BODY MASS INDEX: 31.98 KG/M2 | OXYGEN SATURATION: 95 % | TEMPERATURE: 97.1 F | WEIGHT: 162.4 LBS | DIASTOLIC BLOOD PRESSURE: 74 MMHG | SYSTOLIC BLOOD PRESSURE: 136 MMHG | HEART RATE: 111 BPM

## 2021-02-16 DIAGNOSIS — M54.31 SCIATICA, RIGHT SIDE: Primary | ICD-10-CM

## 2021-02-16 PROCEDURE — G8427 DOCREV CUR MEDS BY ELIG CLIN: HCPCS | Performed by: FAMILY MEDICINE

## 2021-02-16 PROCEDURE — G8400 PT W/DXA NO RESULTS DOC: HCPCS | Performed by: FAMILY MEDICINE

## 2021-02-16 PROCEDURE — 1090F PRES/ABSN URINE INCON ASSESS: CPT | Performed by: FAMILY MEDICINE

## 2021-02-16 PROCEDURE — G8482 FLU IMMUNIZE ORDER/ADMIN: HCPCS | Performed by: FAMILY MEDICINE

## 2021-02-16 PROCEDURE — G8417 CALC BMI ABV UP PARAM F/U: HCPCS | Performed by: FAMILY MEDICINE

## 2021-02-16 PROCEDURE — 4040F PNEUMOC VAC/ADMIN/RCVD: CPT | Performed by: FAMILY MEDICINE

## 2021-02-16 PROCEDURE — 99213 OFFICE O/P EST LOW 20 MIN: CPT | Performed by: FAMILY MEDICINE

## 2021-02-16 PROCEDURE — 1123F ACP DISCUSS/DSCN MKR DOCD: CPT | Performed by: FAMILY MEDICINE

## 2021-02-16 PROCEDURE — 1036F TOBACCO NON-USER: CPT | Performed by: FAMILY MEDICINE

## 2021-02-16 RX ORDER — TIZANIDINE 2 MG/1
2 TABLET ORAL NIGHTLY PRN
Qty: 10 TABLET | Refills: 1 | Status: SHIPPED
Start: 2021-02-16 | End: 2022-06-08 | Stop reason: SDUPTHER

## 2021-02-16 ASSESSMENT — PATIENT HEALTH QUESTIONNAIRE - PHQ9
1. LITTLE INTEREST OR PLEASURE IN DOING THINGS: 0
2. FEELING DOWN, DEPRESSED OR HOPELESS: 0
SUM OF ALL RESPONSES TO PHQ QUESTIONS 1-9: 0

## 2021-02-23 ENCOUNTER — HOSPITAL ENCOUNTER (OUTPATIENT)
Dept: MRI IMAGING | Age: 76
Discharge: HOME | End: 2021-02-25
Payer: MEDICARE

## 2021-02-23 DIAGNOSIS — M54.31 SCIATICA, RIGHT SIDE: ICD-10-CM

## 2021-02-23 PROCEDURE — 72148 MRI LUMBAR SPINE W/O DYE: CPT

## 2021-02-24 ENCOUNTER — TELEPHONE (OUTPATIENT)
Dept: FAMILY MEDICINE CLINIC | Age: 76
End: 2021-02-24

## 2021-02-24 NOTE — TELEPHONE ENCOUNTER
Per Dr Seth Azar, MRI shows significant abnormality at l3-4, might be surgical. Diffuse spondylosis, might be worth a try at shots before commits to surgical consult. Pain management referral is already placed from appt on 2/16, needing to know if she wants to continue with that route first or if she would like the consult placed for neurosurgeon. Patient was on another call and  to have her call back.

## 2021-02-24 NOTE — TELEPHONE ENCOUNTER
Per previous msg, this is already set up for pain management to do injections. We only needed to know if patient wanted neurosurg referral instead. Spoke with Camila, verbalized understanding and agreeable with current plan.

## 2021-02-24 NOTE — TELEPHONE ENCOUNTER
Minda called back and was advised. She is willing to do this if you think it is best for her. However, she already has an appointment with Dr Dayana Villafana on Monday. Will he do this, or sdes she need to cancel that appointment and see someone else?

## 2021-03-02 ENCOUNTER — TELEPHONE (OUTPATIENT)
Dept: FAMILY MEDICINE CLINIC | Age: 76
End: 2021-03-02

## 2021-03-08 ENCOUNTER — OFFICE VISIT (OUTPATIENT)
Dept: FAMILY MEDICINE CLINIC | Age: 76
End: 2021-03-08
Payer: MEDICARE

## 2021-03-08 VITALS
OXYGEN SATURATION: 97 % | TEMPERATURE: 96.9 F | SYSTOLIC BLOOD PRESSURE: 136 MMHG | HEART RATE: 104 BPM | DIASTOLIC BLOOD PRESSURE: 78 MMHG

## 2021-03-08 DIAGNOSIS — M72.0 DUPUYTREN'S CONTRACTURE OF LEFT HAND: Primary | ICD-10-CM

## 2021-03-08 DIAGNOSIS — M54.31 SCIATICA OF RIGHT SIDE: ICD-10-CM

## 2021-03-08 PROCEDURE — 4040F PNEUMOC VAC/ADMIN/RCVD: CPT | Performed by: FAMILY MEDICINE

## 2021-03-08 PROCEDURE — 1036F TOBACCO NON-USER: CPT | Performed by: FAMILY MEDICINE

## 2021-03-08 PROCEDURE — 99213 OFFICE O/P EST LOW 20 MIN: CPT | Performed by: FAMILY MEDICINE

## 2021-03-08 PROCEDURE — 1090F PRES/ABSN URINE INCON ASSESS: CPT | Performed by: FAMILY MEDICINE

## 2021-03-08 PROCEDURE — G8427 DOCREV CUR MEDS BY ELIG CLIN: HCPCS | Performed by: FAMILY MEDICINE

## 2021-03-08 PROCEDURE — G8400 PT W/DXA NO RESULTS DOC: HCPCS | Performed by: FAMILY MEDICINE

## 2021-03-08 PROCEDURE — 1123F ACP DISCUSS/DSCN MKR DOCD: CPT | Performed by: FAMILY MEDICINE

## 2021-03-08 PROCEDURE — G8417 CALC BMI ABV UP PARAM F/U: HCPCS | Performed by: FAMILY MEDICINE

## 2021-03-08 PROCEDURE — G8482 FLU IMMUNIZE ORDER/ADMIN: HCPCS | Performed by: FAMILY MEDICINE

## 2021-03-08 ASSESSMENT — ENCOUNTER SYMPTOMS
BLOOD IN STOOL: 0
VOMITING: 0
CHEST TIGHTNESS: 0
CONSTIPATION: 0
DIARRHEA: 0
WHEEZING: 0
ABDOMINAL PAIN: 0
COUGH: 0
EYES NEGATIVE: 1

## 2021-03-08 NOTE — PROGRESS NOTES
OFFICE NOTE    3/8/21  Name: Stefania Kinney  :1945   Sex:female   Age:75 y.o. SUBJECTIVE  Chief Complaint   Patient presents with    Trigger finger     R ring finger       Patient presents for complaints of trigger finger. R ring finger is effected. Also has complaints of sinus drainage. says she has had sciatica which has been a little atypical on right side. Saw Pain management and they have her going to therapy and it seems to be helping her         Review of Systems   Constitutional: Negative for appetite change, fever and unexpected weight change. HENT: Positive for postnasal drip. Negative for congestion and ear pain. Eyes: Negative. Respiratory: Negative for cough, chest tightness and wheezing. Cardiovascular: Negative for chest pain and palpitations. Gastrointestinal: Negative for abdominal pain, blood in stool, constipation, diarrhea and vomiting. Endocrine: Negative for cold intolerance, polydipsia and polyuria. Genitourinary: Negative for dysuria and hematuria. Musculoskeletal: Positive for arthralgias and myalgias. Negative for gait problem and joint swelling. Skin: Negative for rash and wound. Allergic/Immunologic: Negative for environmental allergies and food allergies. Neurological: Negative for dizziness, tremors, weakness and headaches. Hematological: Negative for adenopathy. Does not bruise/bleed easily. Psychiatric/Behavioral: Negative for behavioral problems, confusion, dysphoric mood and sleep disturbance.             Current Outpatient Medications:     tiZANidine (ZANAFLEX) 2 MG tablet, Take 1 tablet by mouth nightly as needed (sciatica.), Disp: 10 tablet, Rfl: 1    benzonatate (TESSALON) 100 MG capsule, take 1 TO 2 capsules by mouth three times a day if needed for cough, Disp: , Rfl:     amoxicillin (AMOXIL) 500 MG capsule, take 1 capsule by mouth three times a day, Disp: , Rfl:     fluticasone (FLONASE) 50 MCG/ACT nasal spray, 2 sprays by Each Nostril route daily, Disp: 1 Bottle, Rfl: 0    amLODIPine (NORVASC) 5 MG tablet, Take 0.5 tablets by mouth daily (Patient taking differently: Take 5 mg by mouth daily ), Disp: 45 tablet, Rfl: 3    metoprolol succinate (TOPROL XL) 25 MG extended release tablet, Take 0.5 tablets by mouth 2 times daily 12.5mg BID, Disp: 90 tablet, Rfl: 3    olmesartan (BENICAR) 20 MG tablet, Take 1 tablet by mouth every morning, Disp: 90 tablet, Rfl: 3    omeprazole (PRILOSEC) 40 MG delayed release capsule, Take 1 capsule by mouth every morning, Disp: 90 capsule, Rfl: 3    rosuvastatin (CRESTOR) 10 MG tablet, Take 1 tablet by mouth nightly, Disp: 90 tablet, Rfl: 3    SYNTHROID 75 MCG tablet, Take 1 tablet by mouth every morning, Disp: 90 tablet, Rfl: 1    vitamin D (CHOLECALCIFEROL) 125 MCG (5000 UT) CAPS capsule, Take 5,000 Units by mouth daily, Disp: , Rfl:     metFORMIN (GLUCOPHAGE) 500 MG tablet, 500 mg daily (with breakfast) , Disp: , Rfl:     nitroGLYCERIN (NITROSTAT) 0.4 MG SL tablet, Place 1 tablet under the tongue every 5 minutes as needed for Chest pain, Disp: 25 tablet, Rfl: 3    vitamin E 400 UNIT capsule, Take 400 Units by mouth every morning, Disp: , Rfl:     vitamin C (ASCORBIC ACID) 500 MG tablet, Take 1,000 mg by mouth daily , Disp: , Rfl:     aspirin 81 MG chewable tablet, Take 81 mg by mouth every morning , Disp: , Rfl:   Allergies   Allergen Reactions    Epinephrine      Heart race    Other Nausea Only     unknown antibiotic       Past Medical History:   Diagnosis Date    CAD (coronary artery disease)     follows with Dr. [facility] yearly (next appt3/22/18)    Diabetes mellitus ([facility] [address]     Essential hypertension, benign 3/26/2014    History of colon polyps     Hyperinsulinism     Hyperlipidemia     Hypertension     Hypothyroidism      Past Surgical History:   Procedure Laterality Date    APPENDECTOMY      CARDIAC CATHETERIZATION  5/2011    CARDIAC CATHETERIZATION  1-    Dr. Sisi Bryan SURGERY  4/11    stent placement    CARPAL TUNNEL RELEASE Bilateral     CARPAL TUNNEL RELEASE Left 11/2013    COLONOSCOPY      COLONOSCOPY  07/02/2012    CORONARY ANGIOPLASTY      DIAGNOSTIC CARDIAC CATH LAB PROCEDURE      ENDOSCOPY, COLON, DIAGNOSTIC      EYE SURGERY Right     cataract    HYSTERECTOMY      HYSTERECTOMY, TOTAL ABDOMINAL      POLYPECTOMY  2012    PTCA  2011    WITH STENT 2 TIMES     UPPER GASTROINTESTINAL ENDOSCOPY  2012     Family History   Problem Relation Age of Onset    Heart Attack Mother     Heart Attack Father     Heart Surgery Brother     Heart Attack Sister      Social History     Tobacco History     Smoking Status  Former Smoker Smoking Start Date  11/12/1959 Quit date  11/12/1974 Smoking Frequency  0.5 packs/day for 15 years (7.5 pk yrs)    Smoking Tobacco Type  Cigarettes    Smokeless Tobacco Use  Never Used          Alcohol History     Alcohol Use Status  No          Drug Use     Drug Use Status  No          Sexual Activity     Sexually Active  Not Currently Partners  Male              OBJECTIVE  Vitals:    03/08/21 1017   BP: 136/78   Site: Left Upper Arm   Position: Sitting   Pulse: 104   Temp: 96.9 °F (36.1 °C)   TempSrc: Temporal   SpO2: 97%       There is no height or weight on file to calculate BMI. No orders of the defined types were placed in this encounter. EXAM   Physical Exam  Vitals signs and nursing note reviewed. Constitutional:       Appearance: Normal appearance. Comments: Overweight. Musculoskeletal:      Comments: Early trigger finger flexor pulley mechanism 4th finger on left. Had some musculoskeletal pain RLQ and pelvic brim, some extending down RLE. Seems to be easing up. Flexor tendon for 4th finger slightly thickened. Neurological:      Mental Status: She is alert. ASSESSMENT/PLAN:  Camila was seen today for trigger finger.     Diagnoses and all orders for this visit:    Dupuytren's contracture of left hand  Having 2nd Covid shot next couple of days. Will try sock trip or wearing mittens to bed for a while. Week after shot if need to I can inject pulley mechanism on left hand. Or if prefers can have Dr. Ruthell Holstein do the honors  Sciatica of right side  Seems to be getting better with conservative management which is good. No follow-ups on file. Electronically signed by Nicole Barahona MD on 3/8/21 at 11:06 AM EST    I have personally reviewed and updated the chief complaint, HPI, Past Medical, Family and Social History, as well as the above Review of Systems.

## 2021-03-22 ENCOUNTER — OFFICE VISIT (OUTPATIENT)
Dept: FAMILY MEDICINE CLINIC | Age: 76
End: 2021-03-22
Payer: MEDICARE

## 2021-03-22 VITALS
OXYGEN SATURATION: 94 % | SYSTOLIC BLOOD PRESSURE: 132 MMHG | TEMPERATURE: 97.5 F | HEART RATE: 104 BPM | DIASTOLIC BLOOD PRESSURE: 80 MMHG

## 2021-03-22 DIAGNOSIS — M72.0 DUPUYTREN'S CONTRACTURE OF LEFT HAND: Primary | ICD-10-CM

## 2021-03-22 PROCEDURE — G8427 DOCREV CUR MEDS BY ELIG CLIN: HCPCS | Performed by: FAMILY MEDICINE

## 2021-03-22 PROCEDURE — 1036F TOBACCO NON-USER: CPT | Performed by: FAMILY MEDICINE

## 2021-03-22 PROCEDURE — G8400 PT W/DXA NO RESULTS DOC: HCPCS | Performed by: FAMILY MEDICINE

## 2021-03-22 PROCEDURE — 1090F PRES/ABSN URINE INCON ASSESS: CPT | Performed by: FAMILY MEDICINE

## 2021-03-22 PROCEDURE — 4040F PNEUMOC VAC/ADMIN/RCVD: CPT | Performed by: FAMILY MEDICINE

## 2021-03-22 PROCEDURE — G8417 CALC BMI ABV UP PARAM F/U: HCPCS | Performed by: FAMILY MEDICINE

## 2021-03-22 PROCEDURE — 1123F ACP DISCUSS/DSCN MKR DOCD: CPT | Performed by: FAMILY MEDICINE

## 2021-03-22 PROCEDURE — G8482 FLU IMMUNIZE ORDER/ADMIN: HCPCS | Performed by: FAMILY MEDICINE

## 2021-03-22 PROCEDURE — 20550 NJX 1 TENDON SHEATH/LIGAMENT: CPT | Performed by: FAMILY MEDICINE

## 2021-03-22 RX ORDER — METHYLPREDNISOLONE ACETATE 40 MG/ML
40 INJECTION, SUSPENSION INTRA-ARTICULAR; INTRALESIONAL; INTRAMUSCULAR; SOFT TISSUE ONCE
Status: COMPLETED | OUTPATIENT
Start: 2021-03-22 | End: 2021-03-22

## 2021-03-22 RX ADMIN — METHYLPREDNISOLONE ACETATE 40 MG: 40 INJECTION, SUSPENSION INTRA-ARTICULAR; INTRALESIONAL; INTRAMUSCULAR; SOFT TISSUE at 21:45

## 2021-03-22 NOTE — PROGRESS NOTES
OFFICE NOTE    3/22/21  Name: Dakotah Hampton  :1945   Sex:female   Age:75 y.o. SUBJECTIVE  Chief Complaint   Patient presents with    Trigger finger       HPI has trigger finger on left 4th at flexor tendon pulley mechanism. Has apt to see Dr. Luisito Baugh but not until  and wanted us to give her a shot    Review of Systems   No paresthesias. Pain over palm at pulley mechanism.  No obvious thickening of flexor tendon yet      Current Outpatient Medications:     tiZANidine (ZANAFLEX) 2 MG tablet, Take 1 tablet by mouth nightly as needed (sciatica.), Disp: 10 tablet, Rfl: 1    benzonatate (TESSALON) 100 MG capsule, take 1 TO 2 capsules by mouth three times a day if needed for cough, Disp: , Rfl:     amoxicillin (AMOXIL) 500 MG capsule, take 1 capsule by mouth three times a day, Disp: , Rfl:     fluticasone (FLONASE) 50 MCG/ACT nasal spray, 2 sprays by Each Nostril route daily, Disp: 1 Bottle, Rfl: 0    amLODIPine (NORVASC) 5 MG tablet, Take 0.5 tablets by mouth daily (Patient taking differently: Take 5 mg by mouth daily ), Disp: 45 tablet, Rfl: 3    metoprolol succinate (TOPROL XL) 25 MG extended release tablet, Take 0.5 tablets by mouth 2 times daily 12.5mg BID, Disp: 90 tablet, Rfl: 3    olmesartan (BENICAR) 20 MG tablet, Take 1 tablet by mouth every morning, Disp: 90 tablet, Rfl: 3    omeprazole (PRILOSEC) 40 MG delayed release capsule, Take 1 capsule by mouth every morning, Disp: 90 capsule, Rfl: 3    rosuvastatin (CRESTOR) 10 MG tablet, Take 1 tablet by mouth nightly, Disp: 90 tablet, Rfl: 3    SYNTHROID 75 MCG tablet, Take 1 tablet by mouth every morning, Disp: 90 tablet, Rfl: 1    vitamin D (CHOLECALCIFEROL) 125 MCG (5000 UT) CAPS capsule, Take 5,000 Units by mouth daily, Disp: , Rfl:     metFORMIN (GLUCOPHAGE) 500 MG tablet, 500 mg daily (with breakfast) , Disp: , Rfl:     nitroGLYCERIN (NITROSTAT) 0.4 MG SL tablet, Place 1 tablet under the tongue every 5 minutes as needed for Chest pain, Disp: 25 tablet, Rfl: 3    vitamin E 400 UNIT capsule, Take 400 Units by mouth every morning, Disp: , Rfl:     vitamin C (ASCORBIC ACID) 500 MG tablet, Take 1,000 mg by mouth daily , Disp: , Rfl:     aspirin 81 MG chewable tablet, Take 81 mg by mouth every morning , Disp: , Rfl:   Allergies   Allergen Reactions    Epinephrine      Heart race    Other Nausea Only     unknown antibiotic       Past Medical History:   Diagnosis Date    CAD (coronary artery disease)     follows with Dr. Akshat Luna yearly (next appt3/22/18)    Diabetes mellitus ([facility] [address]     Essential hypertension, benign 3/26/2014    History of colon polyps     Hyperinsulinism     Hyperlipidemia     Hypertension     Hypothyroidism      Past Surgical History:   Procedure Laterality Date    APPENDECTOMY      CARDIAC CATHETERIZATION  5/2011    CARDIAC CATHETERIZATION  1-    Dr. Cj Dinh  4/11    stent placement    CARPAL TUNNEL RELEASE Bilateral     CARPAL TUNNEL RELEASE Left 11/2013    COLONOSCOPY      COLONOSCOPY  07/02/2012    CORONARY ANGIOPLASTY      DIAGNOSTIC CARDIAC CATH LAB PROCEDURE      ENDOSCOPY, COLON, DIAGNOSTIC      EYE SURGERY Right     cataract    HYSTERECTOMY      HYSTERECTOMY, TOTAL ABDOMINAL      POLYPECTOMY  2012    PTCA  2011    WITH STENT 2 TIMES     UPPER GASTROINTESTINAL ENDOSCOPY  2012     Family History   Problem Relation Age of Onset    Heart Attack Mother     Heart Attack Father     Heart Surgery Brother     Heart Attack Sister      Social History     Tobacco History     Smoking Status  Former Smoker Smoking Start Date  11/12/1959 Quit date  11/12/1974 Smoking Frequency  0.5 packs/day for 15 years (7.5 pk yrs)    Smoking Tobacco Type  Cigarettes    Smokeless Tobacco Use  Never Used          Alcohol History     Alcohol Use Status  No          Drug Use     Drug Use Status  No          Sexual Activity     Sexually Active  Not Currently Partners  Male OBJECTIVE  Vitals:    03/22/21 1320   BP: 132/80   Site: Left Upper Arm   Position: Sitting   Pulse: 104   Temp: 97.5 °F (36.4 °C)   TempSrc: Temporal   SpO2: 94%        There is no height or weight on file to calculate BMI. Orders Placed This Encounter   Procedures    NE INJECT TENDON SHEATH/LIGAMENT        EXAM   Physical Exam  Vitals signs and nursing note reviewed. Musculoskeletal:         General: Tenderness and deformity present. No swelling or signs of injury. Right lower leg: No edema. Left lower leg: No edema. Comments: Pain over flexor pulley mechanism. Left 4th finger in palm. No actual locking encountered but finger flexed 20-25 degrees from full flexion. Full extension produces pain   Skin:     Coloration: Skin is not jaundiced. Findings: No bruising or rash. Camila was seen today for trigger finger. Diagnoses and all orders for this visit:    Dupuytren's contracture of left hand  -     NE INJECT TENDON SHEATH/LIGAMENT  -     methylPREDNISolone acetate (DEPO-MEDROL) injection 40 mg    Injected flexor tendon pulley mechanism left 4th finger using TB syringe with 40 mg methyl pred, 1/2 on either side. bandaid applied. Should keep apt with Dr David Coughlin as symptoms likely to return. Avoid trauma like carrying heavy buckets with left hand      No follow-ups on file.     Electronically signed by Zachariah Haider MD on 3/22/21 at 2:00 PM EDT

## 2021-05-26 LAB
ALBUMIN SERPL-MCNC: 4.1 G/DL
ALP BLD-CCNC: 76 U/L
ALT SERPL-CCNC: 33 U/L
ANION GAP SERPL CALCULATED.3IONS-SCNC: NORMAL MMOL/L
AST SERPL-CCNC: 33 U/L
AVERAGE GLUCOSE: NORMAL
BILIRUB SERPL-MCNC: 0.6 MG/DL (ref 0.1–1.4)
BUN BLDV-MCNC: 17 MG/DL
CALCIUM SERPL-MCNC: 9.9 MG/DL
CHLORIDE BLD-SCNC: 104 MMOL/L
CHOLESTEROL, TOTAL: 132 MG/DL
CHOLESTEROL/HDL RATIO: 3.3
CO2: 27 MMOL/L
CREAT SERPL-MCNC: 0.8 MG/DL
CREATININE, URINE: 101
GFR CALCULATED: 72
GLUCOSE BLD-MCNC: 119 MG/DL
HBA1C MFR BLD: 6.2 %
HDLC SERPL-MCNC: 40 MG/DL (ref 35–70)
LDL CHOLESTEROL CALCULATED: 73 MG/DL (ref 0–160)
MICROALBUMIN/CREAT 24H UR: 0.3 MG/G{CREAT}
MICROALBUMIN/CREAT UR-RTO: 3
NONHDLC SERPL-MCNC: 92 MG/DL
POTASSIUM SERPL-SCNC: 4.7 MMOL/L
SODIUM BLD-SCNC: 138 MMOL/L
T4 FREE: 1.1
TOTAL PROTEIN: 7.2
TRIGL SERPL-MCNC: 104 MG/DL
TSH SERPL DL<=0.05 MIU/L-ACNC: 0.84 UIU/ML
VLDLC SERPL CALC-MCNC: NORMAL MG/DL

## 2021-06-03 ENCOUNTER — OFFICE VISIT (OUTPATIENT)
Dept: FAMILY MEDICINE CLINIC | Age: 76
End: 2021-06-03
Payer: MEDICARE

## 2021-06-03 VITALS
TEMPERATURE: 97.2 F | OXYGEN SATURATION: 98 % | WEIGHT: 160.2 LBS | DIASTOLIC BLOOD PRESSURE: 80 MMHG | BODY MASS INDEX: 31.55 KG/M2 | SYSTOLIC BLOOD PRESSURE: 134 MMHG | HEART RATE: 75 BPM

## 2021-06-03 DIAGNOSIS — I10 ESSENTIAL HYPERTENSION, BENIGN: Chronic | ICD-10-CM

## 2021-06-03 DIAGNOSIS — E06.3 HYPOTHYROIDISM DUE TO HASHIMOTO'S THYROIDITIS: ICD-10-CM

## 2021-06-03 DIAGNOSIS — M46.1 SACROILIITIS (HCC): ICD-10-CM

## 2021-06-03 DIAGNOSIS — E03.8 HYPOTHYROIDISM DUE TO HASHIMOTO'S THYROIDITIS: ICD-10-CM

## 2021-06-03 DIAGNOSIS — E11.9 TYPE 2 DIABETES MELLITUS WITHOUT COMPLICATION, WITHOUT LONG-TERM CURRENT USE OF INSULIN (HCC): ICD-10-CM

## 2021-06-03 DIAGNOSIS — I25.10 CORONARY ARTERY DISEASE INVOLVING NATIVE CORONARY ARTERY OF NATIVE HEART WITHOUT ANGINA PECTORIS: Primary | ICD-10-CM

## 2021-06-03 PROCEDURE — 99214 OFFICE O/P EST MOD 30 MIN: CPT | Performed by: FAMILY MEDICINE

## 2021-06-03 PROCEDURE — 4040F PNEUMOC VAC/ADMIN/RCVD: CPT | Performed by: FAMILY MEDICINE

## 2021-06-03 PROCEDURE — G8400 PT W/DXA NO RESULTS DOC: HCPCS | Performed by: FAMILY MEDICINE

## 2021-06-03 PROCEDURE — 1036F TOBACCO NON-USER: CPT | Performed by: FAMILY MEDICINE

## 2021-06-03 PROCEDURE — G8417 CALC BMI ABV UP PARAM F/U: HCPCS | Performed by: FAMILY MEDICINE

## 2021-06-03 PROCEDURE — 1123F ACP DISCUSS/DSCN MKR DOCD: CPT | Performed by: FAMILY MEDICINE

## 2021-06-03 PROCEDURE — G8427 DOCREV CUR MEDS BY ELIG CLIN: HCPCS | Performed by: FAMILY MEDICINE

## 2021-06-03 PROCEDURE — 1090F PRES/ABSN URINE INCON ASSESS: CPT | Performed by: FAMILY MEDICINE

## 2021-06-03 RX ORDER — LEVOTHYROXINE SODIUM 75 MCG
75 TABLET ORAL EVERY MORNING
Qty: 90 TABLET | Refills: 1 | Status: CANCELLED | OUTPATIENT
Start: 2021-06-03

## 2021-06-03 RX ORDER — LEVOTHYROXINE SODIUM 75 MCG
75 TABLET ORAL EVERY MORNING
Qty: 90 TABLET | Refills: 1 | Status: SHIPPED
Start: 2021-06-03 | End: 2021-12-09 | Stop reason: SDUPTHER

## 2021-06-03 RX ORDER — OLMESARTAN MEDOXOMIL 20 MG/1
20 TABLET ORAL EVERY MORNING
Qty: 90 TABLET | Refills: 1 | Status: SHIPPED
Start: 2021-06-03 | End: 2021-12-09 | Stop reason: SDUPTHER

## 2021-06-03 ASSESSMENT — ENCOUNTER SYMPTOMS
PHOTOPHOBIA: 0
CHEST TIGHTNESS: 0
COUGH: 0
ABDOMINAL PAIN: 0
VOMITING: 0
CONSTIPATION: 0
EYE REDNESS: 0
SHORTNESS OF BREATH: 0
WHEEZING: 0
EYES NEGATIVE: 1
BLOOD IN STOOL: 0
BACK PAIN: 1
DIARRHEA: 0

## 2021-06-03 NOTE — PROGRESS NOTES
OFFICE NOTE    6/3/21  Name: Yudi Jackson  :1945   Sex:female   Age:75 y.o. SUBJECTIVE  Chief Complaint   Patient presents with    Hypertension    Hyperlipidemia    Hypothyroidism       Patient presents for routine follow up. Has a finger nail she is concerned has a fungus. Requires routine medication refills. denies chest pain, orthopnea, palpitations. Has apt with Cardiology coming up very soon        Review of Systems   Constitutional: Negative for appetite change, fever and unexpected weight change. HENT: Positive for hearing loss. Negative for congestion, ear pain and postnasal drip. Eyes: Negative. Negative for photophobia, redness and visual disturbance. Respiratory: Negative for cough, chest tightness, shortness of breath and wheezing. Cardiovascular: Negative for chest pain and palpitations. Gastrointestinal: Negative for abdominal pain, blood in stool, constipation, diarrhea and vomiting. Endocrine: Negative for cold intolerance, polydipsia and polyuria. Genitourinary: Positive for urgency. Negative for dysuria and hematuria. Musculoskeletal: Positive for arthralgias and back pain. Negative for gait problem and joint swelling. Skin: Negative for rash and wound. Allergic/Immunologic: Negative for environmental allergies and food allergies. Neurological: Negative for dizziness, tremors, seizures, weakness, numbness and headaches. Hematological: Negative for adenopathy. Does not bruise/bleed easily. Psychiatric/Behavioral: Negative for behavioral problems, confusion, dysphoric mood and sleep disturbance. The patient is nervous/anxious.              Current Outpatient Medications:     SYNTHROID 75 MCG tablet, Take 1 tablet by mouth every morning, Disp: 90 tablet, Rfl: 1    olmesartan (BENICAR) 20 MG tablet, Take 1 tablet by mouth every morning, Disp: 90 tablet, Rfl: 1    tiZANidine (ZANAFLEX) 2 MG tablet, Take 1 tablet by mouth nightly as needed (sciatica.), Disp: 10 tablet, Rfl: 1    benzonatate (TESSALON) 100 MG capsule, take 1 TO 2 capsules by mouth three times a day if needed for cough, Disp: , Rfl:     amoxicillin (AMOXIL) 500 MG capsule, take 1 capsule by mouth three times a day, Disp: , Rfl:     fluticasone (FLONASE) 50 MCG/ACT nasal spray, 2 sprays by Each Nostril route daily, Disp: 1 Bottle, Rfl: 0    amLODIPine (NORVASC) 5 MG tablet, Take 0.5 tablets by mouth daily (Patient taking differently: Take 5 mg by mouth daily ), Disp: 45 tablet, Rfl: 3    metoprolol succinate (TOPROL XL) 25 MG extended release tablet, Take 0.5 tablets by mouth 2 times daily 12.5mg BID, Disp: 90 tablet, Rfl: 3    omeprazole (PRILOSEC) 40 MG delayed release capsule, Take 1 capsule by mouth every morning, Disp: 90 capsule, Rfl: 3    rosuvastatin (CRESTOR) 10 MG tablet, Take 1 tablet by mouth nightly, Disp: 90 tablet, Rfl: 3    vitamin D (CHOLECALCIFEROL) 125 MCG (5000 UT) CAPS capsule, Take 5,000 Units by mouth daily, Disp: , Rfl:     metFORMIN (GLUCOPHAGE) 500 MG tablet, 500 mg daily (with breakfast) , Disp: , Rfl:     nitroGLYCERIN (NITROSTAT) 0.4 MG SL tablet, Place 1 tablet under the tongue every 5 minutes as needed for Chest pain, Disp: 25 tablet, Rfl: 3    vitamin E 400 UNIT capsule, Take 400 Units by mouth every morning, Disp: , Rfl:     vitamin C (ASCORBIC ACID) 500 MG tablet, Take 1,000 mg by mouth daily , Disp: , Rfl:     aspirin 81 MG chewable tablet, Take 81 mg by mouth every morning , Disp: , Rfl:   Allergies   Allergen Reactions    Epinephrine      Heart race    Other Nausea Only     unknown antibiotic       Past Medical History:   Diagnosis Date    CAD (coronary artery disease)     follows with Dr. Correa [facility] yearly (next appt3/22/18)    Diabetes mellitus ([facility] [address]     Essential hypertension, benign 3/26/2014    History of colon polyps     Hyperinsulinism     Hyperlipidemia     Hypertension     Hypothyroidism      Past Surgical History: Procedure Laterality Date    APPENDECTOMY      CARDIAC CATHETERIZATION  5/2011    CARDIAC CATHETERIZATION  1-    Dr. Garcia Fall  4/11    stent placement    CARPAL TUNNEL RELEASE Bilateral     CARPAL TUNNEL RELEASE Left 11/2013    COLONOSCOPY      COLONOSCOPY  07/02/2012    CORONARY ANGIOPLASTY      DIAGNOSTIC CARDIAC CATH LAB PROCEDURE      ENDOSCOPY, COLON, DIAGNOSTIC      EYE SURGERY Right     cataract    HYSTERECTOMY      HYSTERECTOMY, TOTAL ABDOMINAL      POLYPECTOMY  2012    PTCA  2011    WITH STENT 2 TIMES     UPPER GASTROINTESTINAL ENDOSCOPY  2012     Family History   Problem Relation Age of Onset    Heart Attack Mother     Heart Attack Father     Heart Surgery Brother     Heart Attack Sister      Social History     Tobacco History     Smoking Status  Former Smoker Smoking Start Date  11/12/1959 Quit date  11/12/1974 Smoking Frequency  0.5 packs/day for 15 years (7.5 pk yrs)    Smoking Tobacco Type  Cigarettes    Smokeless Tobacco Use  Never Used          Alcohol History     Alcohol Use Status  No          Drug Use     Drug Use Status  No          Sexual Activity     Sexually Active  Not Currently Partners  Male              OBJECTIVE  Vitals:    06/03/21 1027   BP: 134/80   Site: Left Upper Arm   Position: Sitting   Pulse: 75   Temp: 97.2 °F (36.2 °C)   TempSrc: Temporal   SpO2: 98%   Weight: 160 lb 3.2 oz (72.7 kg)        Body mass index is 31.55 kg/m². Patient is overweight    No orders of the defined types were placed in this encounter. EXAM   Physical Exam  Vitals and nursing note reviewed. Constitutional:       Appearance: She is well-developed. Comments: overweight   HENT:      Right Ear: Tympanic membrane, ear canal and external ear normal.      Left Ear: Tympanic membrane, ear canal and external ear normal.      Nose: Nose normal.      Mouth/Throat:      Pharynx: Oropharynx is clear.    Eyes:      Conjunctiva/sclera: Conjunctivae normal. Pupils: Pupils are equal, round, and reactive to light. Neck:      Thyroid: No thyroid mass or thyromegaly. Vascular: No JVD. Trachea: Trachea normal.   Cardiovascular:      Rate and Rhythm: Normal rate and regular rhythm. Pulses: Normal pulses. Heart sounds: Normal heart sounds. No murmur heard. No gallop. Pulmonary:      Effort: Pulmonary effort is normal.      Breath sounds: Normal breath sounds. No wheezing, rhonchi or rales. Abdominal:      General: Bowel sounds are normal. There is no distension. Palpations: Abdomen is soft. There is no mass. Tenderness: There is no abdominal tenderness. There is no guarding. Hernia: No hernia is present. Musculoskeletal:         General: No swelling. Normal range of motion. Cervical back: Normal range of motion and neck supple. Right lower leg: No edema. Left lower leg: No edema. Lymphadenopathy:      Cervical: No cervical adenopathy. Skin:     General: Skin is warm and dry. Capillary Refill: Capillary refill takes less than 2 seconds. Coloration: Skin is not jaundiced. Findings: No bruising or rash. Neurological:      General: No focal deficit present. Mental Status: She is alert and oriented to person, place, and time. Sensory: No sensory deficit. Motor: No weakness or abnormal muscle tone. Coordination: Coordination normal.      Gait: Gait normal.   Psychiatric:         Mood and Affect: Mood normal.         Behavior: Behavior normal.           Camila was seen today for hypertension, hyperlipidemia and hypothyroidism. Diagnoses and all orders for this visit:    Coronary artery disease involving native coronary artery of native heart without angina pectoris  No ischemic symptoms. Seeing Cardiology soon. Some concern about LV function  Hypothyroidism due to Hashimoto's thyroiditis  -     SYNTHROID 75 MCG tablet;  Take 1 tablet by mouth every morning  Labs current, no changes made.   Sacroiliitis ([facility] [address]  Had laminectomy back doing much better  Type 2 diabetes mellitus without complication, without long-term current use of insulin ([facility] [address]  Sees Dr. Nona Agee soon, home BS have been pretty good  Essential hypertension, benign  -     olmesartan (BENICAR) 20 MG tablet; Take 1 tablet by mouth every morning  Well controlled no changes made  Recheck in 6 mos    No follow-ups on file. Electronically signed by Savannah Burciaga MD on 6/3/21 at 11:05 AM EDT    I have personally reviewed and updated the chief complaint, HPI, Past Medical, Family and Social History, as well as the above Review of Systems.

## 2021-06-18 ENCOUNTER — OFFICE VISIT (OUTPATIENT)
Dept: FAMILY MEDICINE CLINIC | Age: 76
End: 2021-06-18
Payer: MEDICARE

## 2021-06-18 VITALS
BODY MASS INDEX: 31.45 KG/M2 | RESPIRATION RATE: 20 BRPM | HEART RATE: 85 BPM | SYSTOLIC BLOOD PRESSURE: 134 MMHG | DIASTOLIC BLOOD PRESSURE: 80 MMHG | WEIGHT: 156 LBS | HEIGHT: 59 IN | TEMPERATURE: 97.7 F | OXYGEN SATURATION: 98 %

## 2021-06-18 DIAGNOSIS — I10 ESSENTIAL HYPERTENSION, BENIGN: Primary | Chronic | ICD-10-CM

## 2021-06-18 DIAGNOSIS — F41.9 ANXIETY: ICD-10-CM

## 2021-06-18 PROCEDURE — G8427 DOCREV CUR MEDS BY ELIG CLIN: HCPCS | Performed by: FAMILY MEDICINE

## 2021-06-18 PROCEDURE — 1090F PRES/ABSN URINE INCON ASSESS: CPT | Performed by: FAMILY MEDICINE

## 2021-06-18 PROCEDURE — 4040F PNEUMOC VAC/ADMIN/RCVD: CPT | Performed by: FAMILY MEDICINE

## 2021-06-18 PROCEDURE — G8417 CALC BMI ABV UP PARAM F/U: HCPCS | Performed by: FAMILY MEDICINE

## 2021-06-18 PROCEDURE — 1036F TOBACCO NON-USER: CPT | Performed by: FAMILY MEDICINE

## 2021-06-18 PROCEDURE — G8400 PT W/DXA NO RESULTS DOC: HCPCS | Performed by: FAMILY MEDICINE

## 2021-06-18 PROCEDURE — 99213 OFFICE O/P EST LOW 20 MIN: CPT | Performed by: FAMILY MEDICINE

## 2021-06-18 PROCEDURE — 1123F ACP DISCUSS/DSCN MKR DOCD: CPT | Performed by: FAMILY MEDICINE

## 2021-06-18 SDOH — ECONOMIC STABILITY: TRANSPORTATION INSECURITY
IN THE PAST 12 MONTHS, HAS LACK OF TRANSPORTATION KEPT YOU FROM MEETINGS, WORK, OR FROM GETTING THINGS NEEDED FOR DAILY LIVING?: NO

## 2021-06-18 SDOH — ECONOMIC STABILITY: TRANSPORTATION INSECURITY
IN THE PAST 12 MONTHS, HAS THE LACK OF TRANSPORTATION KEPT YOU FROM MEDICAL APPOINTMENTS OR FROM GETTING MEDICATIONS?: NO

## 2021-06-18 SDOH — ECONOMIC STABILITY: FOOD INSECURITY: WITHIN THE PAST 12 MONTHS, YOU WORRIED THAT YOUR FOOD WOULD RUN OUT BEFORE YOU GOT MONEY TO BUY MORE.: NEVER TRUE

## 2021-06-18 SDOH — ECONOMIC STABILITY: FOOD INSECURITY: WITHIN THE PAST 12 MONTHS, THE FOOD YOU BOUGHT JUST DIDN'T LAST AND YOU DIDN'T HAVE MONEY TO GET MORE.: NEVER TRUE

## 2021-06-18 ASSESSMENT — SOCIAL DETERMINANTS OF HEALTH (SDOH): HOW HARD IS IT FOR YOU TO PAY FOR THE VERY BASICS LIKE FOOD, HOUSING, MEDICAL CARE, AND HEATING?: NOT HARD AT ALL

## 2021-06-18 NOTE — PROGRESS NOTES
OFFICE NOTE    21  Name: Rachele Brody  :1945   Sex:female   Age:75 y.o. SUBJECTIVE  Chief Complaint   Patient presents with    Hypertension     elevated BP/felt like she was going to pass out yesterday        HPI says was sick for 2 days stayed in bed, didn't eat anything, took her meds as always. When she would stand up fellt peripheral vision went dark and she would have to sit down. Better now    Review of Systems   Urine less concentrated appearing. Denies bleeding from rectum or elsewhere.  Denies syncope or palpitations      Current Outpatient Medications:     SYNTHROID 75 MCG tablet, Take 1 tablet by mouth every morning, Disp: 90 tablet, Rfl: 1    olmesartan (BENICAR) 20 MG tablet, Take 1 tablet by mouth every morning, Disp: 90 tablet, Rfl: 1    tiZANidine (ZANAFLEX) 2 MG tablet, Take 1 tablet by mouth nightly as needed (sciatica.), Disp: 10 tablet, Rfl: 1    benzonatate (TESSALON) 100 MG capsule, take 1 TO 2 capsules by mouth three times a day if needed for cough, Disp: , Rfl:     amoxicillin (AMOXIL) 500 MG capsule, take 1 capsule by mouth three times a day, Disp: , Rfl:     fluticasone (FLONASE) 50 MCG/ACT nasal spray, 2 sprays by Each Nostril route daily, Disp: 1 Bottle, Rfl: 0    amLODIPine (NORVASC) 5 MG tablet, Take 0.5 tablets by mouth daily (Patient taking differently: Take 5 mg by mouth daily ), Disp: 45 tablet, Rfl: 3    metoprolol succinate (TOPROL XL) 25 MG extended release tablet, Take 0.5 tablets by mouth 2 times daily 12.5mg BID, Disp: 90 tablet, Rfl: 3    omeprazole (PRILOSEC) 40 MG delayed release capsule, Take 1 capsule by mouth every morning, Disp: 90 capsule, Rfl: 3    rosuvastatin (CRESTOR) 10 MG tablet, Take 1 tablet by mouth nightly, Disp: 90 tablet, Rfl: 3    vitamin D (CHOLECALCIFEROL) 125 MCG (5000 UT) CAPS capsule, Take 5,000 Units by mouth daily, Disp: , Rfl:     metFORMIN (GLUCOPHAGE) 500 MG tablet, 500 mg daily (with breakfast) , Disp: , Rfl:   nitroGLYCERIN (NITROSTAT) 0.4 MG SL tablet, Place 1 tablet under the tongue every 5 minutes as needed for Chest pain, Disp: 25 tablet, Rfl: 3    vitamin E 400 UNIT capsule, Take 400 Units by mouth every morning, Disp: , Rfl:     vitamin C (ASCORBIC ACID) 500 MG tablet, Take 1,000 mg by mouth daily , Disp: , Rfl:     aspirin 81 MG chewable tablet, Take 81 mg by mouth every morning , Disp: , Rfl:   Allergies   Allergen Reactions    Epinephrine      Heart race    Other Nausea Only     unknown antibiotic       Past Medical History:   Diagnosis Date    CAD (coronary artery disease)     follows with Dr. Wiliam Gamboa yearly (next appt3/22/18)    Diabetes mellitus ([facility] [address]     Essential hypertension, benign 3/26/2014    History of colon polyps     Hyperinsulinism     Hyperlipidemia     Hypertension     Hypothyroidism      Past Surgical History:   Procedure Laterality Date    APPENDECTOMY      CARDIAC CATHETERIZATION  5/2011    CARDIAC CATHETERIZATION  1-    Dr. Edie Lobato  4/11    stent placement    CARPAL TUNNEL RELEASE Bilateral     CARPAL TUNNEL RELEASE Left 11/2013    COLONOSCOPY      COLONOSCOPY  07/02/2012    CORONARY ANGIOPLASTY      DIAGNOSTIC CARDIAC CATH LAB PROCEDURE      ENDOSCOPY, COLON, DIAGNOSTIC      EYE SURGERY Right     cataract    HYSTERECTOMY      HYSTERECTOMY, TOTAL ABDOMINAL      POLYPECTOMY  2012    PTCA  2011    WITH STENT 2 TIMES     UPPER GASTROINTESTINAL ENDOSCOPY  2012     Family History   Problem Relation Age of Onset    Heart Attack Mother     Heart Attack Father     Heart Surgery Brother     Heart Attack Sister      Social History     Tobacco History     Smoking Status  Former Smoker Smoking Start Date  11/12/1959 Quit date  11/12/1974 Smoking Frequency  0.5 packs/day for 15 years (7.5 pk yrs)    Smoking Tobacco Type  Cigarettes    Smokeless Tobacco Use  Never Used          Alcohol History     Alcohol Use Status  No          Drug Use     Drug Use Status  No          Sexual Activity     Sexually Active  Not Currently Partners  Male                OBJECTIVE  Vitals:    06/18/21 1317 06/18/21 1410   BP: (!) 144/80 134/80   Pulse: 85    Resp: 20    Temp: 97.7 °F (36.5 °C)    TempSrc: Temporal    SpO2: 98%    Weight: 156 lb (70.8 kg)    Height: 4' 11\" (1.499 m)         Body mass index is 31.51 kg/m². No orders of the defined types were placed in this encounter. EXAM   Physical Exam  Vitals and nursing note reviewed. Constitutional:       Appearance: Normal appearance. Comments: Overweight. BP not orthostatic today   HENT:      Mouth/Throat:      Mouth: Mucous membranes are moist.      Pharynx: Oropharynx is clear. No posterior oropharyngeal erythema. Eyes:      Conjunctiva/sclera: Conjunctivae normal.   Cardiovascular:      Rate and Rhythm: Normal rate and regular rhythm. Pulses: Normal pulses. Heart sounds: No murmur heard. Pulmonary:      Effort: Pulmonary effort is normal.      Breath sounds: Normal breath sounds. No wheezing or rales. Lymphadenopathy:      Cervical: No cervical adenopathy. Skin:     Coloration: Skin is not jaundiced. Findings: No bruising or rash. Neurological:      General: No focal deficit present. Mental Status: She is alert and oriented to person, place, and time. Camila was seen today for hypertension. Diagnoses and all orders for this visit:    Essential hypertension, benign  Was experiencing orthostatic symptoms from staying in bed and not eating for couple of days. Symptoms have resolved. Reassured. Should push fluids and consider holding couple of her meds if repeats  Anxiety  Does not really take anything, does pretty well. No follow-ups on file.     Electronically signed by Cristóbal Judd MD on 6/18/21 at 1:52 PM EDT

## 2021-09-14 ENCOUNTER — TELEPHONE (OUTPATIENT)
Dept: FAMILY MEDICINE CLINIC | Age: 76
End: 2021-09-14

## 2021-09-14 DIAGNOSIS — B96.89 ACUTE BACTERIAL SINUSITIS: Primary | ICD-10-CM

## 2021-09-14 DIAGNOSIS — J01.90 ACUTE BACTERIAL SINUSITIS: Primary | ICD-10-CM

## 2021-09-14 NOTE — TELEPHONE ENCOUNTER
Yunior Wisdom is visiting her sister in [place] and has a sinus infection. It started a couple days ago and is getting worse. The drainage is now thicker and yellow. She is asking if you can call in a Z-pack or something to the pharmacy there.      [address]

## 2021-09-15 RX ORDER — AZITHROMYCIN 250 MG/1
250 TABLET, FILM COATED ORAL SEE ADMIN INSTRUCTIONS
Qty: 6 TABLET | Refills: 0 | Status: SHIPPED | OUTPATIENT
Start: 2021-09-15 | End: 2021-09-20

## 2021-11-08 ENCOUNTER — TELEPHONE (OUTPATIENT)
Dept: FAMILY MEDICINE CLINIC | Age: 76
End: 2021-11-08

## 2021-11-08 NOTE — TELEPHONE ENCOUNTER
----- Message from Yenni Thornton sent at 11/8/2021  2:54 PM EST -----  Subject: Referral Request    QUESTIONS   Reason for referral request? Orthopedics   Has the physician seen you for this condition before? No   Preferred Specialist (if applicable)? Do you already have an appointment scheduled? No  Additional Information for Provider? Patient would like a referral sent to   [facility] for her right knee to be looked at.   ---------------------------------------------------------------------------  --------------  [address]  What is the best way for the office to contact you? OK to leave message on   voicemail  Preferred Call Back Phone Number?  [phone]

## 2021-11-10 NOTE — TELEPHONE ENCOUNTER
Camila called back and was advised. She said that she got an appointment without a referral.  She has an appointment there with the nurse practitioner on 11/22/21.

## 2021-12-09 ENCOUNTER — OFFICE VISIT (OUTPATIENT)
Dept: FAMILY MEDICINE CLINIC | Age: 76
End: 2021-12-09
Payer: MEDICARE

## 2021-12-09 VITALS
WEIGHT: 162.4 LBS | TEMPERATURE: 96.2 F | HEART RATE: 105 BPM | DIASTOLIC BLOOD PRESSURE: 78 MMHG | BODY MASS INDEX: 32.8 KG/M2 | SYSTOLIC BLOOD PRESSURE: 136 MMHG | OXYGEN SATURATION: 92 %

## 2021-12-09 DIAGNOSIS — I10 ESSENTIAL HYPERTENSION, BENIGN: Chronic | ICD-10-CM

## 2021-12-09 DIAGNOSIS — E78.49 OTHER HYPERLIPIDEMIA: ICD-10-CM

## 2021-12-09 DIAGNOSIS — E06.3 HYPOTHYROIDISM DUE TO HASHIMOTO'S THYROIDITIS: ICD-10-CM

## 2021-12-09 DIAGNOSIS — M79.661 RIGHT CALF PAIN: Primary | ICD-10-CM

## 2021-12-09 DIAGNOSIS — K21.9 GASTROESOPHAGEAL REFLUX DISEASE WITHOUT ESOPHAGITIS: ICD-10-CM

## 2021-12-09 DIAGNOSIS — E03.8 HYPOTHYROIDISM DUE TO HASHIMOTO'S THYROIDITIS: ICD-10-CM

## 2021-12-09 PROCEDURE — G8484 FLU IMMUNIZE NO ADMIN: HCPCS | Performed by: FAMILY MEDICINE

## 2021-12-09 PROCEDURE — G0008 ADMIN INFLUENZA VIRUS VAC: HCPCS | Performed by: FAMILY MEDICINE

## 2021-12-09 PROCEDURE — 99214 OFFICE O/P EST MOD 30 MIN: CPT | Performed by: FAMILY MEDICINE

## 2021-12-09 PROCEDURE — 4040F PNEUMOC VAC/ADMIN/RCVD: CPT | Performed by: FAMILY MEDICINE

## 2021-12-09 PROCEDURE — 1036F TOBACCO NON-USER: CPT | Performed by: FAMILY MEDICINE

## 2021-12-09 PROCEDURE — G8400 PT W/DXA NO RESULTS DOC: HCPCS | Performed by: FAMILY MEDICINE

## 2021-12-09 PROCEDURE — 90694 VACC AIIV4 NO PRSRV 0.5ML IM: CPT | Performed by: FAMILY MEDICINE

## 2021-12-09 PROCEDURE — 1090F PRES/ABSN URINE INCON ASSESS: CPT | Performed by: FAMILY MEDICINE

## 2021-12-09 PROCEDURE — G8417 CALC BMI ABV UP PARAM F/U: HCPCS | Performed by: FAMILY MEDICINE

## 2021-12-09 PROCEDURE — G8427 DOCREV CUR MEDS BY ELIG CLIN: HCPCS | Performed by: FAMILY MEDICINE

## 2021-12-09 PROCEDURE — 1123F ACP DISCUSS/DSCN MKR DOCD: CPT | Performed by: FAMILY MEDICINE

## 2021-12-09 RX ORDER — AMLODIPINE BESYLATE 5 MG/1
2.5 TABLET ORAL DAILY
Qty: 45 TABLET | Refills: 3 | Status: SHIPPED
Start: 2021-12-09 | End: 2021-12-09

## 2021-12-09 RX ORDER — ROSUVASTATIN CALCIUM 10 MG/1
10 TABLET, COATED ORAL NIGHTLY
Qty: 90 TABLET | Refills: 3 | Status: SHIPPED
Start: 2021-12-09 | End: 2022-06-08 | Stop reason: SDUPTHER

## 2021-12-09 RX ORDER — AMLODIPINE BESYLATE 5 MG/1
5 TABLET ORAL DAILY
Qty: 90 TABLET | Refills: 1 | Status: SHIPPED
Start: 2021-12-09 | End: 2022-06-08 | Stop reason: SDUPTHER

## 2021-12-09 RX ORDER — OMEPRAZOLE 40 MG/1
40 CAPSULE, DELAYED RELEASE ORAL EVERY MORNING
Qty: 90 CAPSULE | Refills: 3 | Status: SHIPPED
Start: 2021-12-09 | End: 2022-06-08 | Stop reason: SDUPTHER

## 2021-12-09 RX ORDER — METOPROLOL SUCCINATE 25 MG/1
12.5 TABLET, EXTENDED RELEASE ORAL 2 TIMES DAILY
Qty: 90 TABLET | Refills: 3 | Status: SHIPPED
Start: 2021-12-09 | End: 2022-06-08 | Stop reason: SDUPTHER

## 2021-12-09 RX ORDER — LEVOTHYROXINE SODIUM 75 MCG
75 TABLET ORAL EVERY MORNING
Qty: 90 TABLET | Refills: 1 | Status: SHIPPED
Start: 2021-12-09 | End: 2022-06-08 | Stop reason: SDUPTHER

## 2021-12-09 RX ORDER — OLMESARTAN MEDOXOMIL 20 MG/1
20 TABLET ORAL EVERY MORNING
Qty: 90 TABLET | Refills: 1 | Status: SHIPPED
Start: 2021-12-09 | End: 2022-08-05

## 2021-12-09 ASSESSMENT — ENCOUNTER SYMPTOMS
CHEST TIGHTNESS: 0
PHOTOPHOBIA: 0
ABDOMINAL PAIN: 0
DIARRHEA: 0
EYE REDNESS: 0
COUGH: 0
EYES NEGATIVE: 1
SHORTNESS OF BREATH: 0
CONSTIPATION: 0
VOMITING: 0
BLOOD IN STOOL: 0
WHEEZING: 0

## 2021-12-09 NOTE — PROGRESS NOTES
OFFICE NOTE    21  Name: Marlen Valero  :1945   Sex:female   Age:76 y.o. SUBJECTIVE  Chief Complaint   Patient presents with    Hypertension    Hyperlipidemia    Hypothyroidism    Knee Pain     right, seeing Stefacin, needs xray       HPI Comes in c/o pain in right knee. Sometimes leg below knee \"feels like it is dead\" 6 mos checkup. Had NM stress and carotid u/s done at [facility], we have not received reports    Review of Systems   Constitutional: Positive for fatigue. Negative for appetite change, fever and unexpected weight change. HENT: Positive for congestion and postnasal drip. Negative for ear pain. Eyes: Negative. Negative for photophobia, redness and visual disturbance. Respiratory: Negative for cough, chest tightness, shortness of breath and wheezing. Cardiovascular: Negative for chest pain and palpitations. Saw Cardiologist for this and TIA like symptom, denies current problems with this   Gastrointestinal: Negative for abdominal pain, blood in stool, constipation, diarrhea and vomiting. Endocrine: Negative for cold intolerance, polydipsia and polyuria. Genitourinary: Positive for frequency and urgency. Negative for dysuria and hematuria. Musculoskeletal: Positive for arthralgias and gait problem. Negative for joint swelling. Skin: Negative for rash and wound. Allergic/Immunologic: Negative for environmental allergies and food allergies. Neurological: Negative for dizziness, tremors, seizures, weakness, numbness and headaches. Hematological: Negative for adenopathy. Does not bruise/bleed easily. Psychiatric/Behavioral: Negative for behavioral problems, confusion, dysphoric mood and sleep disturbance. The patient is nervous/anxious. All other systems reviewed and are negative.            Current Outpatient Medications:     metoprolol succinate (TOPROL XL) 25 MG extended release tablet, Take 0.5 tablets by mouth 2 times daily 12.5mg BID, Disp: 90 tablet, Rfl: 3    olmesartan (BENICAR) 20 MG tablet, Take 1 tablet by mouth every morning, Disp: 90 tablet, Rfl: 1    omeprazole (PRILOSEC) 40 MG delayed release capsule, Take 1 capsule by mouth every morning, Disp: 90 capsule, Rfl: 3    rosuvastatin (CRESTOR) 10 MG tablet, Take 1 tablet by mouth nightly, Disp: 90 tablet, Rfl: 3    SYNTHROID 75 MCG tablet, Take 1 tablet by mouth every morning, Disp: 90 tablet, Rfl: 1    amLODIPine (NORVASC) 5 MG tablet, Take 1 tablet by mouth daily At night.  Supercedes previous order for 1/2 tab at hs, Disp: 90 tablet, Rfl: 1    tiZANidine (ZANAFLEX) 2 MG tablet, Take 1 tablet by mouth nightly as needed (sciatica.), Disp: 10 tablet, Rfl: 1    benzonatate (TESSALON) 100 MG capsule, take 1 TO 2 capsules by mouth three times a day if needed for cough, Disp: , Rfl:     amoxicillin (AMOXIL) 500 MG capsule, take 1 capsule by mouth three times a day, Disp: , Rfl:     fluticasone (FLONASE) 50 MCG/ACT nasal spray, 2 sprays by Each Nostril route daily, Disp: 1 Bottle, Rfl: 0    vitamin D (CHOLECALCIFEROL) 125 MCG (5000 UT) CAPS capsule, Take 5,000 Units by mouth daily, Disp: , Rfl:     metFORMIN (GLUCOPHAGE) 500 MG tablet, 500 mg daily (with breakfast) , Disp: , Rfl:     nitroGLYCERIN (NITROSTAT) 0.4 MG SL tablet, Place 1 tablet under the tongue every 5 minutes as needed for Chest pain, Disp: 25 tablet, Rfl: 3    vitamin E 400 UNIT capsule, Take 400 Units by mouth every morning, Disp: , Rfl:     vitamin C (ASCORBIC ACID) 500 MG tablet, Take 1,000 mg by mouth daily , Disp: , Rfl:     aspirin 81 MG chewable tablet, Take 81 mg by mouth every morning , Disp: , Rfl:   Allergies   Allergen Reactions    Epinephrine      Heart race    Other Nausea Only     unknown antibiotic       Past Medical History:   Diagnosis Date    CAD (coronary artery disease)     follows with Dr. Tootie Morales yearly (next appt3/22/18)    Diabetes mellitus ([facility] [address]     Essential hypertension, benign 3/26/2014    History of colon polyps     Hyperinsulinism     Hyperlipidemia     Hypertension     Hypothyroidism      Past Surgical History:   Procedure Laterality Date    APPENDECTOMY      CARDIAC CATHETERIZATION  5/2011    CARDIAC CATHETERIZATION  1-    Dr. Mary Anne Garay  4/11    stent placement    CARPAL TUNNEL RELEASE Bilateral     CARPAL TUNNEL RELEASE Left 11/2013    COLONOSCOPY      COLONOSCOPY  07/02/2012    CORONARY ANGIOPLASTY      DIAGNOSTIC CARDIAC CATH LAB PROCEDURE      ENDOSCOPY, COLON, DIAGNOSTIC      EYE SURGERY Right     cataract    HYSTERECTOMY      HYSTERECTOMY, TOTAL ABDOMINAL      POLYPECTOMY  2012    PTCA  2011    WITH STENT 2 TIMES     UPPER GASTROINTESTINAL ENDOSCOPY  2012     Family History   Problem Relation Age of Onset    Heart Attack Mother     Heart Attack Father     Heart Surgery Brother     Heart Attack Sister      Social History     Tobacco History     Smoking Status  Former Smoker Smoking Start Date  11/12/1959 Quit date  11/12/1974 Smoking Frequency  0.5 packs/day for 15 years (7.5 pk yrs)    Smoking Tobacco Type  Cigarettes    Smokeless Tobacco Use  Never Used          Alcohol History     Alcohol Use Status  No          Drug Use     Drug Use Status  No          Sexual Activity     Sexually Active  Not Currently Partners  Male                OBJECTIVE  Vitals:    12/09/21 0805 12/09/21 0830   BP: (!) 146/74 136/78   Site: Left Upper Arm    Position: Sitting    Pulse: 105    Temp: 96.2 °F (35.7 °C)    TempSrc: Temporal    SpO2: 92%    Weight: 162 lb 6.4 oz (73.7 kg)         Body mass index is 32.8 kg/m².     Orders Placed This Encounter   Procedures    US DUP LOWER EXTREMITY RIGHT EMERSON     Standing Status:   Future     Standing Expiration Date:   12/9/2022     Order Specific Question:   Reason for exam:     Answer:   suspect Baker's cyst    INFLUENZA, QUADV, ADJUVANTED, 72 YRS =, IM, PF, PREFILL SYR, 0.5ML (FLUAD)        EXAM Physical Exam  Vitals and nursing note reviewed. Constitutional:       Appearance: Normal appearance. She is well-developed. She is obese. HENT:      Right Ear: Tympanic membrane, ear canal and external ear normal.      Left Ear: Tympanic membrane, ear canal and external ear normal.      Nose: Nose normal.      Mouth/Throat:      Pharynx: Oropharynx is clear. No posterior oropharyngeal erythema. Eyes:      General: No scleral icterus. Conjunctiva/sclera: Conjunctivae normal.      Pupils: Pupils are equal, round, and reactive to light. Neck:      Thyroid: No thyroid mass or thyromegaly. Vascular: No carotid bruit or JVD. Trachea: Trachea normal.   Cardiovascular:      Rate and Rhythm: Regular rhythm. Tachycardia present. Heart sounds: Normal heart sounds. No murmur heard. No gallop. Pulmonary:      Effort: Pulmonary effort is normal.      Breath sounds: Normal breath sounds. No wheezing, rhonchi or rales. Abdominal:      General: Bowel sounds are normal. There is no distension. Palpations: Abdomen is soft. There is no mass. Tenderness: There is no abdominal tenderness. There is no guarding. Musculoskeletal:         General: Swelling and tenderness present. Normal range of motion. Cervical back: Neck supple. No tenderness. Right lower leg: No edema. Comments: No effusion and fairly normal ROM right knee. Suspected Baker's cyst proximal gastrocnemius muscle which is tender. Normal strength, circulation and sensation in distal extremity at this time   Lymphadenopathy:      Cervical: No cervical adenopathy. Skin:     General: Skin is warm and dry. Coloration: Skin is not jaundiced. Findings: No bruising or rash. Neurological:      General: No focal deficit present. Mental Status: She is alert and oriented to person, place, and time. Motor: No abnormal muscle tone.    Psychiatric:         Mood and Affect: Mood normal.         Behavior: Behavior normal.           Camila was seen today for hypertension, hyperlipidemia, hypothyroidism and knee pain. Diagnoses and all orders for this visit:    Right calf pain  -     US DUP LOWER EXTREMITY RIGHT EMEROSN; Future  Doubt severe OA in right knee. Pain possibly from apparent Boles's cyst. Will order u/s venous with attention to the possible cyst  Essential hypertension, benign  -     Discontinue: amLODIPine (NORVASC) 5 MG tablet; Take 0.5 tablets by mouth daily  -     metoprolol succinate (TOPROL XL) 25 MG extended release tablet; Take 0.5 tablets by mouth 2 times daily 12.5mg BID  -     olmesartan (BENICAR) 20 MG tablet; Take 1 tablet by mouth every morning  -     amLODIPine (NORVASC) 5 MG tablet; Take 1 tablet by mouth daily At night. Supercedes previous order for 1/2 tab at hs  Mildly elevated will increase amlodipine to 5 mg at hs  Gastroesophageal reflux disease without esophagitis  -     omeprazole (PRILOSEC) 40 MG delayed release capsule; Take 1 capsule by mouth every morning  Suspect responsible for some of her chest pain. Other hyperlipidemia  -     rosuvastatin (CRESTOR) 10 MG tablet; Take 1 tablet by mouth nightly    Hypothyroidism due to Hashimoto's thyroiditis  -     SYNTHROID 75 MCG tablet; Take 1 tablet by mouth every morning  Labs current, euthyroid    Other orders  -     INFLUENZA, QUADV, ADJUVANTED, 65 YRS =, IM, PF, PREFILL SYR, 0.5ML (FLUAD)          Return in about 6 months (around 6/9/2022).     Electronically signed by Jarred Sanchez MD on 12/9/21 at 8:18 AM EST

## 2021-12-10 DIAGNOSIS — Z12.31 ENCOUNTER FOR SCREENING MAMMOGRAM FOR MALIGNANT NEOPLASM OF BREAST: Primary | ICD-10-CM

## 2022-01-22 ENCOUNTER — OFFICE VISIT (OUTPATIENT)
Dept: FAMILY MEDICINE CLINIC | Age: 77
End: 2022-01-22
Payer: MEDICARE

## 2022-01-22 VITALS
DIASTOLIC BLOOD PRESSURE: 80 MMHG | BODY MASS INDEX: 34.13 KG/M2 | SYSTOLIC BLOOD PRESSURE: 126 MMHG | OXYGEN SATURATION: 95 % | WEIGHT: 169 LBS | HEART RATE: 107 BPM | TEMPERATURE: 97.9 F

## 2022-01-22 DIAGNOSIS — M17.11 PRIMARY OSTEOARTHRITIS OF RIGHT KNEE: Primary | ICD-10-CM

## 2022-01-22 DIAGNOSIS — S86.911A KNEE STRAIN, RIGHT, INITIAL ENCOUNTER: ICD-10-CM

## 2022-01-22 PROCEDURE — 1123F ACP DISCUSS/DSCN MKR DOCD: CPT | Performed by: INTERNAL MEDICINE

## 2022-01-22 PROCEDURE — 1036F TOBACCO NON-USER: CPT | Performed by: INTERNAL MEDICINE

## 2022-01-22 PROCEDURE — 1090F PRES/ABSN URINE INCON ASSESS: CPT | Performed by: INTERNAL MEDICINE

## 2022-01-22 PROCEDURE — G8400 PT W/DXA NO RESULTS DOC: HCPCS | Performed by: INTERNAL MEDICINE

## 2022-01-22 PROCEDURE — G8427 DOCREV CUR MEDS BY ELIG CLIN: HCPCS | Performed by: INTERNAL MEDICINE

## 2022-01-22 PROCEDURE — 4040F PNEUMOC VAC/ADMIN/RCVD: CPT | Performed by: INTERNAL MEDICINE

## 2022-01-22 PROCEDURE — 99212 OFFICE O/P EST SF 10 MIN: CPT | Performed by: INTERNAL MEDICINE

## 2022-01-22 PROCEDURE — G8417 CALC BMI ABV UP PARAM F/U: HCPCS | Performed by: INTERNAL MEDICINE

## 2022-01-22 PROCEDURE — G8484 FLU IMMUNIZE NO ADMIN: HCPCS | Performed by: INTERNAL MEDICINE

## 2022-01-22 ASSESSMENT — ENCOUNTER SYMPTOMS
DIARRHEA: 0
COUGH: 0
ABDOMINAL PAIN: 0
BACK PAIN: 0
COLOR CHANGE: 0
SHORTNESS OF BREATH: 0
VOMITING: 0
RHINORRHEA: 0
CONSTIPATION: 0

## 2022-01-22 NOTE — PATIENT INSTRUCTIONS
Take ibuprofen 800 mg twice daily as needed for knee pain. If not improvement in 1-2 weeks follow up with family doctor.     Electronically signed by Kelly Gonzalez DO on 1/22/2022 at 9:18 AM

## 2022-01-22 NOTE — PROGRESS NOTES
Camila [facility] (:  1945) is a 67 y.o. female here at urgent care clinic for evaluation of the following chief complaint(s):  Knee Pain ()           ASSESSMENT/PLAN:  1. Primary osteoarthritis of right knee  2. Knee strain, right, initial encounter    Recommend ibuprofen over-the-counter. She previously had a prescription from us from her dentist.    Dee Loyd that her pain likely will improve with conservative management. If symptoms persist may consider knee x-ray or additional analgesia with topical NSAID. Return if symptoms worsen or fail to improve. Subjective   SUBJECTIVE/OBJECTIVE:  HPI     Twisted knee on Thursday at stove after tripping on dog, did not fall. Tried Tylenol with no significant improvement. Pain seem to be worse this morning. After she moved a little bit was able to have a little less pain. No surgeries on this leg, however she reports possibly tearing her meniscus about 30 years ago which had no significant on her mobility. She is able to bear weight, ambulated here without any assistance. She denies any swelling of the joint. She was recently diagnosed with a Baker's cyst on the right knee. Review of Systems   Constitutional: Negative for chills, fatigue, fever and unexpected weight change. HENT: Negative for congestion and rhinorrhea. Respiratory: Negative for cough and shortness of breath. Cardiovascular: Negative for chest pain and palpitations. Gastrointestinal: Negative for abdominal pain, constipation, diarrhea and vomiting. Genitourinary: Negative for dysuria and frequency. Musculoskeletal: Positive for arthralgias (right knee). Negative for back pain. Skin: Negative for color change, pallor and wound. Neurological: Negative for syncope and light-headedness. Psychiatric/Behavioral: Negative for dysphoric mood. The patient is not nervous/anxious.            Objective   Physical Exam  Constitutional:       General: She is not in acute distress. Appearance: She is well-developed. HENT:      Head: Normocephalic and atraumatic. Eyes:      General: No scleral icterus. Conjunctiva/sclera: Conjunctivae normal.   Neck:      Trachea: No tracheal deviation. Cardiovascular:      Rate and Rhythm: Normal rate. Heart sounds: No murmur heard. Pulmonary:      Effort: Pulmonary effort is normal. No respiratory distress. Breath sounds: Normal breath sounds. Abdominal:      General: Bowel sounds are normal. There is no distension. Palpations: Abdomen is soft. Tenderness: There is no abdominal tenderness. Musculoskeletal:         General: Tenderness (right knee with swelling in popliteal fossa c/w Baker's cyst) present. No swelling or deformity. Normal range of motion. Cervical back: Normal range of motion. Comments: Right knee with degenerative changes noted and bony hypertrophy. Flexion limited beyond 90 degrees secondary to pain. Extension well-preserved. Skin:     General: Skin is warm and dry. Neurological:      Mental Status: She is alert and oriented to person, place, and time. Psychiatric:         Behavior: Behavior normal.         Thought Content: Thought content normal.         Judgment: Judgment normal.                An electronic signature was used to authenticate this note.     --Myke Mcgill DO

## 2022-01-24 ENCOUNTER — TELEPHONE (OUTPATIENT)
Dept: FAMILY MEDICINE CLINIC | Age: 77
End: 2022-01-24

## 2022-01-24 NOTE — TELEPHONE ENCOUNTER
----- Message from Pedro Zapata sent at 1/24/2022  8:31 AM EST -----  Subject: Message to Provider    QUESTIONS  Information for Provider? Pt wants to know if she has lab orders from from   Dr. Amie Duckworth to do labs at [facility]. Dr. Amie Duckworth is her   endocrinologist. Pt says he sent the orders to Dr. Shoshana Adamson office. Please   give the pt a call back to advise.   ---------------------------------------------------------------------------  --------------  CALL BACK INFO  What is the best way for the office to contact you? OK to leave message on   voicemail  Preferred Call Back Phone Number?  [phone]  ---------------------------------------------------------------------------  --------------  SCRIPT ANSWERS  undefined

## 2022-06-08 ENCOUNTER — OFFICE VISIT (OUTPATIENT)
Dept: FAMILY MEDICINE CLINIC | Age: 77
End: 2022-06-08
Payer: MEDICARE

## 2022-06-08 VITALS
SYSTOLIC BLOOD PRESSURE: 116 MMHG | HEART RATE: 92 BPM | HEIGHT: 59 IN | WEIGHT: 165.8 LBS | TEMPERATURE: 97.1 F | BODY MASS INDEX: 33.43 KG/M2 | OXYGEN SATURATION: 95 % | RESPIRATION RATE: 17 BRPM | DIASTOLIC BLOOD PRESSURE: 76 MMHG

## 2022-06-08 DIAGNOSIS — M54.31 SCIATICA, RIGHT SIDE: ICD-10-CM

## 2022-06-08 DIAGNOSIS — E11.9 TYPE 2 DIABETES MELLITUS WITHOUT COMPLICATION, WITHOUT LONG-TERM CURRENT USE OF INSULIN (HCC): ICD-10-CM

## 2022-06-08 DIAGNOSIS — E03.8 HYPOTHYROIDISM DUE TO HASHIMOTO'S THYROIDITIS: ICD-10-CM

## 2022-06-08 DIAGNOSIS — Z86.010 PERSONAL HISTORY OF COLONIC POLYPS: Primary | ICD-10-CM

## 2022-06-08 DIAGNOSIS — E06.3 HYPOTHYROIDISM DUE TO HASHIMOTO'S THYROIDITIS: ICD-10-CM

## 2022-06-08 DIAGNOSIS — I10 ESSENTIAL HYPERTENSION, BENIGN: Chronic | ICD-10-CM

## 2022-06-08 DIAGNOSIS — K21.9 GASTROESOPHAGEAL REFLUX DISEASE WITHOUT ESOPHAGITIS: ICD-10-CM

## 2022-06-08 DIAGNOSIS — M81.0 AGE-RELATED OSTEOPOROSIS WITHOUT CURRENT PATHOLOGICAL FRACTURE: ICD-10-CM

## 2022-06-08 DIAGNOSIS — E78.49 OTHER HYPERLIPIDEMIA: ICD-10-CM

## 2022-06-08 PROBLEM — M46.1 SACROILIITIS (HCC): Status: RESOLVED | Noted: 2021-06-03 | Resolved: 2022-06-08

## 2022-06-08 LAB — HBA1C MFR BLD: 6.4 %

## 2022-06-08 PROCEDURE — 1123F ACP DISCUSS/DSCN MKR DOCD: CPT | Performed by: FAMILY MEDICINE

## 2022-06-08 PROCEDURE — 1090F PRES/ABSN URINE INCON ASSESS: CPT | Performed by: FAMILY MEDICINE

## 2022-06-08 PROCEDURE — G8417 CALC BMI ABV UP PARAM F/U: HCPCS | Performed by: FAMILY MEDICINE

## 2022-06-08 PROCEDURE — 83036 HEMOGLOBIN GLYCOSYLATED A1C: CPT | Performed by: FAMILY MEDICINE

## 2022-06-08 PROCEDURE — 99214 OFFICE O/P EST MOD 30 MIN: CPT | Performed by: FAMILY MEDICINE

## 2022-06-08 PROCEDURE — 1036F TOBACCO NON-USER: CPT | Performed by: FAMILY MEDICINE

## 2022-06-08 PROCEDURE — G8427 DOCREV CUR MEDS BY ELIG CLIN: HCPCS | Performed by: FAMILY MEDICINE

## 2022-06-08 PROCEDURE — 3044F HG A1C LEVEL LT 7.0%: CPT | Performed by: FAMILY MEDICINE

## 2022-06-08 PROCEDURE — G8400 PT W/DXA NO RESULTS DOC: HCPCS | Performed by: FAMILY MEDICINE

## 2022-06-08 RX ORDER — METOPROLOL SUCCINATE 25 MG/1
12.5 TABLET, EXTENDED RELEASE ORAL 2 TIMES DAILY
Qty: 90 TABLET | Refills: 3 | Status: SHIPPED | OUTPATIENT
Start: 2022-06-08

## 2022-06-08 RX ORDER — AMLODIPINE BESYLATE 5 MG/1
5 TABLET ORAL DAILY
Qty: 90 TABLET | Refills: 1 | Status: SHIPPED | OUTPATIENT
Start: 2022-06-08

## 2022-06-08 RX ORDER — TIZANIDINE 2 MG/1
2 TABLET ORAL NIGHTLY PRN
Qty: 10 TABLET | Refills: 1 | Status: SHIPPED
Start: 2022-06-08 | End: 2022-10-19

## 2022-06-08 RX ORDER — LEVOTHYROXINE SODIUM 75 MCG
75 TABLET ORAL EVERY MORNING
Qty: 90 TABLET | Refills: 1 | Status: SHIPPED | OUTPATIENT
Start: 2022-06-08

## 2022-06-08 RX ORDER — OMEPRAZOLE 40 MG/1
40 CAPSULE, DELAYED RELEASE ORAL EVERY MORNING
Qty: 90 CAPSULE | Refills: 3 | Status: SHIPPED | OUTPATIENT
Start: 2022-06-08

## 2022-06-08 RX ORDER — ROSUVASTATIN CALCIUM 10 MG/1
10 TABLET, COATED ORAL NIGHTLY
Qty: 90 TABLET | Refills: 3 | Status: SHIPPED | OUTPATIENT
Start: 2022-06-08

## 2022-06-08 ASSESSMENT — ENCOUNTER SYMPTOMS
COUGH: 0
PHOTOPHOBIA: 0
CONSTIPATION: 0
SHORTNESS OF BREATH: 0
DIARRHEA: 0
EYE REDNESS: 0
CHEST TIGHTNESS: 0
VOMITING: 0
EYES NEGATIVE: 1
WHEEZING: 0
ABDOMINAL PAIN: 0
BACK PAIN: 1
BLOOD IN STOOL: 0

## 2022-06-08 ASSESSMENT — PATIENT HEALTH QUESTIONNAIRE - PHQ9
SUM OF ALL RESPONSES TO PHQ QUESTIONS 1-9: 0
SUM OF ALL RESPONSES TO PHQ QUESTIONS 1-9: 0
1. LITTLE INTEREST OR PLEASURE IN DOING THINGS: 0
2. FEELING DOWN, DEPRESSED OR HOPELESS: 0
SUM OF ALL RESPONSES TO PHQ QUESTIONS 1-9: 0
SUM OF ALL RESPONSES TO PHQ QUESTIONS 1-9: 0
SUM OF ALL RESPONSES TO PHQ9 QUESTIONS 1 & 2: 0

## 2022-06-08 ASSESSMENT — LIFESTYLE VARIABLES
HOW OFTEN DO YOU HAVE A DRINK CONTAINING ALCOHOL: MONTHLY OR LESS
HOW MANY STANDARD DRINKS CONTAINING ALCOHOL DO YOU HAVE ON A TYPICAL DAY: 1 OR 2

## 2022-06-08 NOTE — PROGRESS NOTES
OFFICE NOTE    22  Name: Patel Glaser  :1945   Sex:female   Age:76 y.o. SUBJECTIVE  Chief Complaint   Patient presents with    Medication Refill       HPI comes in for checkup and refills. Believes she is doing pretty well. Recently Cardiologist raise her Omesartan to 40 mg per day  Review of Systems   Constitutional: Positive for fatigue. Negative for appetite change, fever and unexpected weight change. HENT: Positive for congestion. Negative for ear pain and postnasal drip. Eyes: Negative. Negative for photophobia, redness and visual disturbance. Respiratory: Negative for cough, chest tightness, shortness of breath and wheezing. Cardiovascular: Positive for palpitations. Negative for chest pain. Gastrointestinal: Negative for abdominal pain, blood in stool, constipation, diarrhea and vomiting. Endocrine: Negative for cold intolerance, polydipsia and polyuria. Genitourinary: Positive for frequency and urgency. Negative for dysuria and hematuria. Musculoskeletal: Positive for arthralgias and back pain. Negative for gait problem and joint swelling. Skin: Negative for rash and wound. Allergic/Immunologic: Negative for environmental allergies and food allergies. Neurological: Negative for dizziness, tremors, seizures, weakness, numbness and headaches. Hematological: Negative for adenopathy. Does not bruise/bleed easily. Psychiatric/Behavioral: Negative for behavioral problems, confusion, dysphoric mood and sleep disturbance. The patient is nervous/anxious.              Current Outpatient Medications:     metoprolol succinate (TOPROL XL) 25 MG extended release tablet, Take 0.5 tablets by mouth 2 times daily 12.5mg BID, Disp: 90 tablet, Rfl: 3    omeprazole (PRILOSEC) 40 MG delayed release capsule, Take 1 capsule by mouth every morning, Disp: 90 capsule, Rfl: 3    rosuvastatin (CRESTOR) 10 MG tablet, Take 1 tablet by mouth nightly, Disp: 90 tablet, Rfl: 3   amLODIPine (NORVASC) 5 MG tablet, Take 1 tablet by mouth daily At night.  Supercedes previous order for 1/2 tab at hs, Disp: 90 tablet, Rfl: 1    tiZANidine (ZANAFLEX) 2 MG tablet, Take 1 tablet by mouth nightly as needed (sciatica.), Disp: 10 tablet, Rfl: 1    SYNTHROID 75 MCG tablet, Take 1 tablet by mouth every morning, Disp: 90 tablet, Rfl: 1    olmesartan (BENICAR) 20 MG tablet, Take 1 tablet by mouth every morning (Patient taking differently: Take 40 mg by mouth every morning ), Disp: 90 tablet, Rfl: 1    vitamin D (CHOLECALCIFEROL) 125 MCG (5000 UT) CAPS capsule, Take 5,000 Units by mouth daily, Disp: , Rfl:     metFORMIN (GLUCOPHAGE) 500 MG tablet, 500 mg daily (with breakfast) , Disp: , Rfl:     nitroGLYCERIN (NITROSTAT) 0.4 MG SL tablet, Place 1 tablet under the tongue every 5 minutes as needed for Chest pain, Disp: 25 tablet, Rfl: 3    vitamin E 400 UNIT capsule, Take 400 Units by mouth every morning, Disp: , Rfl:     vitamin C (ASCORBIC ACID) 500 MG tablet, Take 1,000 mg by mouth daily , Disp: , Rfl:     aspirin 81 MG chewable tablet, Take 81 mg by mouth every morning , Disp: , Rfl:   Allergies   Allergen Reactions    Other Nausea Only     unknown antibiotic       Past Medical History:   Diagnosis Date    CAD (coronary artery disease)     follows with Dr. Aroldo Prieto yearly (next appt3/22/18)    Diabetes mellitus ([facility] [address]     Essential hypertension, benign 3/26/2014    History of colon polyps     Hyperinsulinism     Hyperlipidemia     Hypertension     Hypothyroidism      Past Surgical History:   Procedure Laterality Date    APPENDECTOMY      CARDIAC CATHETERIZATION  5/2011    CARDIAC CATHETERIZATION  1-    Dr. Patricia Daigle  4/11    stent placement    CARPAL TUNNEL RELEASE Bilateral     CARPAL TUNNEL RELEASE Left 11/2013    COLONOSCOPY      COLONOSCOPY  07/02/2012    CORONARY ANGIOPLASTY      DIAGNOSTIC CARDIAC CATH LAB PROCEDURE      ENDOSCOPY, COLON, DIAGNOSTIC      EYE SURGERY Right     cataract    HYSTERECTOMY (CERVIX STATUS UNKNOWN)      Complete at age 27    HYSTERECTOMY, TOTAL ABDOMINAL (CERVIX REMOVED)      POLYPECTOMY  2012    PTCA  2011    WITH STENT 2 TIMES     UPPER GASTROINTESTINAL ENDOSCOPY  2012     Family History   Problem Relation Age of Onset    Heart Attack Mother     Heart Attack Father     Heart Surgery Brother     Heart Attack Sister      Social History     Tobacco History     Smoking Status  Former Smoker Smoking Start Date  11/12/1959 Quit date  11/12/1974 Smoking Frequency  0.5 packs/day for 15 years (7.5 pk yrs)    Smoking Tobacco Type  Cigarettes    Smokeless Tobacco Use  Never Used          Alcohol History     Alcohol Use Status  No          Drug Use     Drug Use Status  No          Sexual Activity     Sexually Active  Not Currently Partners  Male                OBJECTIVE  Vitals:    06/08/22 0828 06/08/22 0838   BP: (!) 142/82 116/76   Pulse: 92    Resp: 17    Temp: 97.1 °F (36.2 °C)    TempSrc: Temporal    SpO2: 95%    Weight: 165 lb 12.8 oz (75.2 kg)    Height: 4' 11\" (1.499 m)         Body mass index is 33.49 kg/m². Orders Placed This Encounter   Procedures    DEXA BONE DENSITY 2 SITES     Standing Status:   Future     Standing Expiration Date:   6/8/2023    POCT glycosylated hemoglobin (Hb A1C)        EXAM   Physical Exam  Vitals and nursing note reviewed. Constitutional:       Appearance: Normal appearance. She is well-developed. She is obese. HENT:      Right Ear: Tympanic membrane, ear canal and external ear normal.      Left Ear: Tympanic membrane, ear canal and external ear normal.      Nose: Congestion present. Mouth/Throat:      Pharynx: Oropharynx is clear. No posterior oropharyngeal erythema. Eyes:      General: No scleral icterus. Conjunctiva/sclera: Conjunctivae normal.      Pupils: Pupils are equal, round, and reactive to light. Neck:      Thyroid: No thyroid mass or thyromegaly.       Vascular: No carotid bruit or JVD. Trachea: Trachea normal.   Cardiovascular:      Rate and Rhythm: Normal rate and regular rhythm. Heart sounds: Murmur heard. No gallop. Pulmonary:      Effort: Pulmonary effort is normal.      Breath sounds: Normal breath sounds. No wheezing, rhonchi or rales. Abdominal:      General: Bowel sounds are normal. There is no distension. Palpations: Abdomen is soft. There is no mass. Tenderness: There is no abdominal tenderness. There is no guarding. Musculoskeletal:         General: No swelling or tenderness. Normal range of motion. Cervical back: Neck supple. No tenderness. Right lower leg: No edema. Left lower leg: No edema. Lymphadenopathy:      Cervical: No cervical adenopathy. Skin:     General: Skin is warm and dry. Coloration: Skin is not jaundiced. Findings: No bruising or rash. Neurological:      General: No focal deficit present. Mental Status: She is alert and oriented to person, place, and time. Sensory: No sensory deficit. Motor: No weakness or abnormal muscle tone. Coordination: Coordination normal.      Gait: Gait normal.   Psychiatric:         Mood and Affect: Mood normal.         Behavior: Behavior normal.           Camila was seen today for medication refill. Diagnoses and all orders for this visit:    Personal history of colonic polyps  Was clear when done by Dr. Lexx Crawford in 2018, advised good for 10 years    Essential hypertension, benign  -     metoprolol succinate (TOPROL XL) 25 MG extended release tablet; Take 0.5 tablets by mouth 2 times daily 12.5mg BID  -     amLODIPine (NORVASC) 5 MG tablet; Take 1 tablet by mouth daily At night. Supercedes previous order for 1/2 tab at hs  Normotensive on current meds, takes amlodipine at hs  Gastroesophageal reflux disease without esophagitis  -     omeprazole (PRILOSEC) 40 MG delayed release capsule;  Take 1 capsule by mouth every morning    Other hyperlipidemia  - rosuvastatin (CRESTOR) 10 MG tablet; Take 1 tablet by mouth nightly    Sciatica, right side  -     tiZANidine (ZANAFLEX) 2 MG tablet; Take 1 tablet by mouth nightly as needed (sciatica.)  Takes this when acts up usually does pretty well. Hypothyroidism due to Hashimoto's thyroiditis  -     SYNTHROID 75 MCG tablet; Take 1 tablet by mouth every morning  Euthyroid status, labs current  Type 2 diabetes mellitus without complication, without long-term current use of insulin ([facility])  -     POCT glycosylated hemoglobin (Hb A1C)  Well controlled, A1C ordered  Age-related osteoporosis without current pathological fracture  -     DEXA BONE DENSITY 2 SITES; Future  Last checked in 2015, omeprazol and synthroid up risk some          No follow-ups on file.     Electronically signed by Raul Bajwa MD on 6/8/22 at 8:51 AM EDT

## 2022-08-05 DIAGNOSIS — I10 ESSENTIAL HYPERTENSION, BENIGN: Chronic | ICD-10-CM

## 2022-08-05 RX ORDER — OLMESARTAN MEDOXOMIL 20 MG/1
40 TABLET ORAL EVERY MORNING
Qty: 180 TABLET | Refills: 1 | Status: SHIPPED | OUTPATIENT
Start: 2022-08-05

## 2022-08-05 NOTE — TELEPHONE ENCOUNTER
Last Appointment:  6/8/2022  Future Appointments   Date Time Provider Lucinda Sexton   12/14/2022  9:00 AM Lazarus Handler,  [address]

## 2022-08-31 ENCOUNTER — TELEPHONE (OUTPATIENT)
Dept: FAMILY MEDICINE CLINIC | Age: 77
End: 2022-08-31

## 2022-08-31 NOTE — TELEPHONE ENCOUNTER
Pt calling in she got home from [place] [address] last night. She is sick, she tested positive for covid on an at home test today. She has sinus drainage, throat discomfort and cant stop blowing her nose. She is not taking anything otc. She is vaccinated and boosted. What do you recommend she do for treatment ?

## 2022-08-31 NOTE — TELEPHONE ENCOUNTER
Should monitor pulse ox at home, rest, fluids, and keep temp down.  If feel she is really ill would need to come to [facility] or ER to get antibodies or paxlovid

## 2022-10-12 ENCOUNTER — TELEPHONE (OUTPATIENT)
Dept: ADMINISTRATIVE | Age: 77
End: 2022-10-12

## 2022-10-12 NOTE — TELEPHONE ENCOUNTER
Pt called to schedule an appt with Dr. Vianney Stewart. Last seen 2020,  pt states she has been having bouts of syncope with no passing out. States she has been to the hospital for this problem and would like to see Dr. Alex Ruiz. She said she called you a few days ago. I would have scheduled her, but I thought you might want to talk to her to find out what is going on.

## 2022-10-18 ENCOUNTER — OFFICE VISIT (OUTPATIENT)
Dept: FAMILY MEDICINE CLINIC | Age: 77
End: 2022-10-18
Payer: MEDICARE

## 2022-10-18 VITALS
TEMPERATURE: 97.2 F | SYSTOLIC BLOOD PRESSURE: 140 MMHG | RESPIRATION RATE: 17 BRPM | OXYGEN SATURATION: 93 % | HEIGHT: 59 IN | HEART RATE: 94 BPM | WEIGHT: 166 LBS | DIASTOLIC BLOOD PRESSURE: 90 MMHG | BODY MASS INDEX: 33.47 KG/M2

## 2022-10-18 DIAGNOSIS — F41.0 PANIC DISORDER: Primary | ICD-10-CM

## 2022-10-18 DIAGNOSIS — E03.8 HYPOTHYROIDISM DUE TO HASHIMOTO'S THYROIDITIS: ICD-10-CM

## 2022-10-18 DIAGNOSIS — Z23 IMMUNIZATION DUE: ICD-10-CM

## 2022-10-18 DIAGNOSIS — I10 ESSENTIAL HYPERTENSION, BENIGN: Chronic | ICD-10-CM

## 2022-10-18 DIAGNOSIS — Z87.898 H/O DIZZINESS: ICD-10-CM

## 2022-10-18 DIAGNOSIS — E06.3 HYPOTHYROIDISM DUE TO HASHIMOTO'S THYROIDITIS: ICD-10-CM

## 2022-10-18 PROCEDURE — 1123F ACP DISCUSS/DSCN MKR DOCD: CPT | Performed by: FAMILY MEDICINE

## 2022-10-18 PROCEDURE — 1090F PRES/ABSN URINE INCON ASSESS: CPT | Performed by: FAMILY MEDICINE

## 2022-10-18 PROCEDURE — G8484 FLU IMMUNIZE NO ADMIN: HCPCS | Performed by: FAMILY MEDICINE

## 2022-10-18 PROCEDURE — 1036F TOBACCO NON-USER: CPT | Performed by: FAMILY MEDICINE

## 2022-10-18 PROCEDURE — G0008 ADMIN INFLUENZA VIRUS VAC: HCPCS | Performed by: FAMILY MEDICINE

## 2022-10-18 PROCEDURE — 99214 OFFICE O/P EST MOD 30 MIN: CPT | Performed by: FAMILY MEDICINE

## 2022-10-18 PROCEDURE — 90694 VACC AIIV4 NO PRSRV 0.5ML IM: CPT | Performed by: FAMILY MEDICINE

## 2022-10-18 PROCEDURE — G8417 CALC BMI ABV UP PARAM F/U: HCPCS | Performed by: FAMILY MEDICINE

## 2022-10-18 PROCEDURE — G8427 DOCREV CUR MEDS BY ELIG CLIN: HCPCS | Performed by: FAMILY MEDICINE

## 2022-10-18 PROCEDURE — G8399 PT W/DXA RESULTS DOCUMENT: HCPCS | Performed by: FAMILY MEDICINE

## 2022-10-18 RX ORDER — AMLODIPINE BESYLATE 5 MG/1
5 TABLET ORAL DAILY
Qty: 90 TABLET | Refills: 1 | Status: CANCELLED | OUTPATIENT
Start: 2022-10-18

## 2022-10-18 RX ORDER — LEVOTHYROXINE SODIUM 75 MCG
75 TABLET ORAL EVERY MORNING
Qty: 90 TABLET | Refills: 1 | Status: CANCELLED | OUTPATIENT
Start: 2022-10-18

## 2022-10-18 RX ORDER — ALPRAZOLAM 0.25 MG/1
0.25 TABLET ORAL 2 TIMES DAILY PRN
Qty: 20 TABLET | Refills: 1 | Status: SHIPPED | OUTPATIENT
Start: 2022-10-18 | End: 2023-01-18

## 2022-10-18 SDOH — ECONOMIC STABILITY: FOOD INSECURITY: WITHIN THE PAST 12 MONTHS, YOU WORRIED THAT YOUR FOOD WOULD RUN OUT BEFORE YOU GOT MONEY TO BUY MORE.: NEVER TRUE

## 2022-10-18 SDOH — ECONOMIC STABILITY: FOOD INSECURITY: WITHIN THE PAST 12 MONTHS, THE FOOD YOU BOUGHT JUST DIDN'T LAST AND YOU DIDN'T HAVE MONEY TO GET MORE.: NEVER TRUE

## 2022-10-18 ASSESSMENT — ENCOUNTER SYMPTOMS
EYES NEGATIVE: 1
EYE REDNESS: 0
ABDOMINAL PAIN: 0
PHOTOPHOBIA: 0
CHEST TIGHTNESS: 0
CONSTIPATION: 0
SHORTNESS OF BREATH: 0
WHEEZING: 0
DIARRHEA: 0
BLOOD IN STOOL: 0
VOMITING: 0
COUGH: 0

## 2022-10-18 ASSESSMENT — SOCIAL DETERMINANTS OF HEALTH (SDOH): HOW HARD IS IT FOR YOU TO PAY FOR THE VERY BASICS LIKE FOOD, HOUSING, MEDICAL CARE, AND HEATING?: NOT HARD AT ALL

## 2022-10-18 NOTE — PROGRESS NOTES
OFFICE NOTE    10/18/22  Name: Mateusz Hull  :1945   Sex:female   Age:77 y.o. SUBJECTIVE  Chief Complaint   Patient presents with    Referral - General     ENT     Dizziness     X3-5 years no comes and goes       HPI reports feeling presyncopal every couple of mos for couple of days. Has had several nuclear treadmills, a cath, and wore a monitor for 2 weeks. Workup negative. Cardiology suggested neurology or audiology work upl    Review of Systems   Constitutional:  Positive for fatigue. Negative for activity change, appetite change, fever and unexpected weight change. HENT:  Negative for congestion, ear pain and postnasal drip. Eyes: Negative. Negative for photophobia, redness and visual disturbance. Respiratory:  Negative for cough, chest tightness, shortness of breath and wheezing. Cardiovascular:  Positive for palpitations. Negative for chest pain and leg swelling. Gastrointestinal:  Negative for abdominal pain, blood in stool, constipation, diarrhea and vomiting. Endocrine: Negative for cold intolerance, polydipsia and polyuria. Genitourinary:  Positive for frequency and urgency. Negative for dysuria and hematuria. Musculoskeletal:  Negative for arthralgias, gait problem and joint swelling. Skin:  Negative for pallor, rash and wound. Allergic/Immunologic: Negative for environmental allergies and food allergies. Neurological:  Negative for dizziness, tremors, weakness and headaches. Hematological:  Negative for adenopathy. Does not bruise/bleed easily. Psychiatric/Behavioral:  Negative for behavioral problems, confusion, dysphoric mood and sleep disturbance. Current Outpatient Medications:     ALPRAZolam (XANAX) 0.25 MG tablet, Take 1 tablet by mouth 2 times daily as needed for Anxiety (as rescue) for up to 20 doses. , Disp: 20 tablet, Rfl: 1    olmesartan (BENICAR) 20 MG tablet, Take 2 tablets by mouth every morning, Disp: 180 tablet, Rfl: 1    metoprolol succinate (TOPROL XL) 25 MG extended release tablet, Take 0.5 tablets by mouth 2 times daily 12.5mg BID, Disp: 90 tablet, Rfl: 3    omeprazole (PRILOSEC) 40 MG delayed release capsule, Take 1 capsule by mouth every morning, Disp: 90 capsule, Rfl: 3    rosuvastatin (CRESTOR) 10 MG tablet, Take 1 tablet by mouth nightly, Disp: 90 tablet, Rfl: 3    amLODIPine (NORVASC) 5 MG tablet, Take 1 tablet by mouth daily At night.  Supercedes previous order for 1/2 tab at hs, Disp: 90 tablet, Rfl: 1    SYNTHROID 75 MCG tablet, Take 1 tablet by mouth every morning, Disp: 90 tablet, Rfl: 1    vitamin D (CHOLECALCIFEROL) 125 MCG (5000 UT) CAPS capsule, Take 5,000 Units by mouth daily, Disp: , Rfl:     metFORMIN (GLUCOPHAGE) 500 MG tablet, 500 mg daily (with breakfast) , Disp: , Rfl:     nitroGLYCERIN (NITROSTAT) 0.4 MG SL tablet, Place 1 tablet under the tongue every 5 minutes as needed for Chest pain, Disp: 25 tablet, Rfl: 3    vitamin E 400 UNIT capsule, Take 400 Units by mouth every morning, Disp: , Rfl:     vitamin C (ASCORBIC ACID) 500 MG tablet, Take 1,000 mg by mouth daily , Disp: , Rfl:     aspirin 81 MG chewable tablet, Take 81 mg by mouth every morning , Disp: , Rfl:     tiZANidine (ZANAFLEX) 2 MG tablet, Take 1 tablet by mouth nightly as needed (sciatica.) (Patient not taking: Reported on 10/18/2022), Disp: 10 tablet, Rfl: 1  Allergies   Allergen Reactions    Other Nausea Only     unknown antibiotic       Past Medical History:   Diagnosis Date    CAD (coronary artery disease)     follows with Dr. Ruy Bellamy yearly (next appt3/22/18)    Diabetes mellitus ([facility] [address]     Essential hypertension, benign 3/26/2014    History of colon polyps     Hyperinsulinism     Hyperlipidemia     Hypertension     Hypothyroidism      Past Surgical History:   Procedure Laterality Date    APPENDECTOMY      CARDIAC CATHETERIZATION  5/2011    CARDIAC CATHETERIZATION  1-    Dr. Gregoria Beltran  4/11    stent placement    CARPAL TUNNEL RELEASE Bilateral     CARPAL TUNNEL RELEASE Left 11/2013    COLONOSCOPY      COLONOSCOPY  07/02/2012    CORONARY ANGIOPLASTY      DIAGNOSTIC CARDIAC CATH LAB PROCEDURE      ENDOSCOPY, COLON, DIAGNOSTIC      EYE SURGERY Right     cataract    HYSTERECTOMY (CERVIX STATUS UNKNOWN)      Complete at age 27    HYSTERECTOMY, TOTAL ABDOMINAL (CERVIX REMOVED)      POLYPECTOMY  2012    PTCA  2011    WITH STENT 2 TIMES     UPPER GASTROINTESTINAL ENDOSCOPY  2012     Family History   Problem Relation Age of Onset    Heart Attack Mother     Heart Attack Father     Heart Surgery Brother     Heart Attack Sister      Social History       Tobacco History       Smoking Status  Former Smoking Start Date  11/12/1959 Quit Date  11/12/1974 Smoking Frequency  0.50 packs/day for 15.00 years (7.50 pk-yrs)    Smoking Tobacco Type  Cigarettes from 11/12/1959 to 11/12/1974      Smokeless Tobacco Use  Never              Alcohol History       Alcohol Use Status  No              Drug Use       Drug Use Status  No              Sexual Activity       Sexually Active  Not Currently Partners  Male                    OBJECTIVE  Vitals:    10/18/22 1540 10/18/22 1553   BP: (!) 170/100 (!) 168/98   Pulse: 94    Resp: 17    Temp: 97.2 °F (36.2 °C)    TempSrc: Temporal    SpO2: 93%    Weight: 166 lb (75.3 kg)    Height: 4' 11\" (1.499 m)         Body mass index is 33.53 kg/m². Orders Placed This Encounter   Procedures    Influenza, FLUAD, (age 72 y+), IM, Preservative Free, 0.5 mL    External Referral To Audiology     Referral Priority:   Routine     Referral Type:   Eval and Treat     Referral Reason:   Specialty Services Required     Referred to Provider:   Padmaja Wilson     Requested Specialty:   Audiology     Number of Visits Requested:   1        EXAM   Physical Exam  Vitals and nursing note reviewed. Constitutional:       Appearance: Normal appearance. She is obese.       Comments: BP labile with demonstrable white coat hypertension   Neck: Vascular: No carotid bruit. Cardiovascular:      Rate and Rhythm: Normal rate and regular rhythm. Heart sounds: No murmur heard. Pulmonary:      Effort: Pulmonary effort is normal.      Breath sounds: No wheezing, rhonchi or rales. Abdominal:      General: Bowel sounds are normal.      Palpations: There is no mass. Musculoskeletal:         General: No swelling or tenderness. Cervical back: No tenderness. Right lower leg: No edema. Left lower leg: No edema. Lymphadenopathy:      Cervical: No cervical adenopathy. Skin:     Coloration: Skin is not jaundiced. Findings: No bruising, erythema or rash. Neurological:      General: No focal deficit present. Mental Status: She is alert and oriented to person, place, and time. Psychiatric:         Mood and Affect: Mood normal.         Behavior: Behavior normal.         Camila was seen today for referral - general and dizziness. Diagnoses and all orders for this visit:    Panic disorder  -     ALPRAZolam (XANAX) 0.25 MG tablet; Take 1 tablet by mouth 2 times daily as needed for Anxiety (as rescue) for up to 20 doses. Clearly has this is rare. Suggest 1 po bid when has a \"spell\"  and see how she does  Hypothyroidism due to Hashimoto's thyroiditis  TSH and T4 both good in July  Essential hypertension, benign  Currently on metoprolol 25 mg 1/2 tab bid. No bradycardic. Will up to 25 in AM and 12.5 in PM for a week and to 25 mg bid if needs to and able  H/O dizziness  -     External Referral To Audiology  Will make sure not vestibular, but bet it is not. Immunization due  -     Influenza, FLUAD, (age 72 y+), IM, Preservative Free, 0.5 mL        No follow-ups on file.     Electronically signed by Mindy Caballero MD on 10/18/22 at 4:37 PM EDT

## 2022-10-19 ENCOUNTER — OFFICE VISIT (OUTPATIENT)
Dept: CARDIOLOGY CLINIC | Age: 77
End: 2022-10-19
Payer: MEDICARE

## 2022-10-19 VITALS
HEIGHT: 59 IN | SYSTOLIC BLOOD PRESSURE: 142 MMHG | HEART RATE: 75 BPM | WEIGHT: 165 LBS | RESPIRATION RATE: 16 BRPM | DIASTOLIC BLOOD PRESSURE: 80 MMHG | BODY MASS INDEX: 33.26 KG/M2

## 2022-10-19 DIAGNOSIS — I25.10 CORONARY ARTERY DISEASE INVOLVING NATIVE CORONARY ARTERY OF NATIVE HEART WITHOUT ANGINA PECTORIS: Primary | ICD-10-CM

## 2022-10-19 DIAGNOSIS — I10 ESSENTIAL HYPERTENSION, BENIGN: ICD-10-CM

## 2022-10-19 DIAGNOSIS — E78.00 PURE HYPERCHOLESTEROLEMIA: ICD-10-CM

## 2022-10-19 PROCEDURE — G8417 CALC BMI ABV UP PARAM F/U: HCPCS | Performed by: INTERNAL MEDICINE

## 2022-10-19 PROCEDURE — G8484 FLU IMMUNIZE NO ADMIN: HCPCS | Performed by: INTERNAL MEDICINE

## 2022-10-19 PROCEDURE — 1090F PRES/ABSN URINE INCON ASSESS: CPT | Performed by: INTERNAL MEDICINE

## 2022-10-19 PROCEDURE — 93000 ELECTROCARDIOGRAM COMPLETE: CPT | Performed by: INTERNAL MEDICINE

## 2022-10-19 PROCEDURE — 99213 OFFICE O/P EST LOW 20 MIN: CPT | Performed by: INTERNAL MEDICINE

## 2022-10-19 PROCEDURE — 1123F ACP DISCUSS/DSCN MKR DOCD: CPT | Performed by: INTERNAL MEDICINE

## 2022-10-19 PROCEDURE — 1036F TOBACCO NON-USER: CPT | Performed by: INTERNAL MEDICINE

## 2022-10-19 PROCEDURE — G8427 DOCREV CUR MEDS BY ELIG CLIN: HCPCS | Performed by: INTERNAL MEDICINE

## 2022-10-19 PROCEDURE — G8399 PT W/DXA RESULTS DOCUMENT: HCPCS | Performed by: INTERNAL MEDICINE

## 2022-10-19 NOTE — PROGRESS NOTES
Patient Active Problem List   Diagnosis    Stented coronary artery - Cx    Essential hypertension, benign    Chest pain    Hypothyroidism    Hyperlipidemia    Diabetes mellitus (HCC)    CAD (coronary artery disease)    H/O heart artery stent    Anxiety       Current Outpatient Medications   Medication Sig Dispense Refill    olmesartan (BENICAR) 20 MG tablet Take 2 tablets by mouth every morning 180 tablet 1    metoprolol succinate (TOPROL XL) 25 MG extended release tablet Take 0.5 tablets by mouth 2 times daily 12.5mg BID 90 tablet 3    omeprazole (PRILOSEC) 40 MG delayed release capsule Take 1 capsule by mouth every morning 90 capsule 3    rosuvastatin (CRESTOR) 10 MG tablet Take 1 tablet by mouth nightly 90 tablet 3    amLODIPine (NORVASC) 5 MG tablet Take 1 tablet by mouth daily At night. Supercedes previous order for 1/2 tab at hs 90 tablet 1    SYNTHROID 75 MCG tablet Take 1 tablet by mouth every morning 90 tablet 1    vitamin D (CHOLECALCIFEROL) 125 MCG (5000 UT) CAPS capsule Take 5,000 Units by mouth daily      metFORMIN (GLUCOPHAGE) 500 MG tablet 500 mg daily (with breakfast)       nitroGLYCERIN (NITROSTAT) 0.4 MG SL tablet Place 1 tablet under the tongue every 5 minutes as needed for Chest pain 25 tablet 3    vitamin E 400 UNIT capsule Take 400 Units by mouth every morning      vitamin C (ASCORBIC ACID) 500 MG tablet Take 1,000 mg by mouth daily       aspirin 81 MG chewable tablet Take 81 mg by mouth every morning       ALPRAZolam (XANAX) 0.25 MG tablet Take 1 tablet by mouth 2 times daily as needed for Anxiety (as rescue) for up to 20 doses. (Patient not taking: Reported on 10/19/2022) 20 tablet 1     No current facility-administered medications for this visit. CC:    Patient is seen in follow up for:  1. Coronary artery disease involving native coronary artery of native heart without angina pectoris    2. Pure hypercholesterolemia    3.  Essential hypertension, benign        HPI:  Seen in follow-up for her coronary artery disease, hypertension and hyperlipidemia. She has had no exertional angina. Still has some intermittent lightheaded spells. No near-syncope or syncope. Previous cardiac evaluation unremarkable.     ROS:   General: No unusual weight gain, no change in exercise tolerance  Skin: No rash or itching  EENT: No vision changes or nosebleeds  Cardiovascular: No orthopnea or paroxysmal nocturnal dyspnea  Respiratory: No cough or hemoptysis  Gastrointestinal: No hematemesis or recent changes in bowel habits  Genitourinary: No hematuria, urgency or frequency  Musculoskeletal: No muscular weakness or joint swelling   Neurologic / Psychiatric: No incoordination or convulsions  Allergic / Immunologic/ Lymphatic / Endocrine: No anemia or bleeding tendency    Social History     Socioeconomic History    Marital status:      Spouse name: Not on file    Number of children: 0    Years of education: 14    Highest education level: Not on file   Occupational History    Occupation: retired   Tobacco Use    Smoking status: Former     Packs/day: 0.50     Years: 15.00     Pack years: 7.50     Types: Cigarettes     Start date: 1959     Quit date: 1974     Years since quittin.9    Smokeless tobacco: Never   Vaping Use    Vaping Use: Never used   Substance and Sexual Activity    Alcohol use: No    Drug use: No    Sexual activity: Not Currently     Partners: Male   Other Topics Concern    Not on file   Social History Narrative    Not on file     Social Determinants of Health     Financial Resource Strain: Low Risk     Difficulty of Paying Living Expenses: Not hard at all   Food Insecurity: No Food Insecurity    Worried About Running Out of Food in the Last Year: Never true    Ran Out of Food in the Last Year: Never true   Transportation Needs: Not on file   Physical Activity: Not on file   Stress: Not on file   Social Connections: Not on file   Intimate Partner Violence: Not on file   Housing Stability: Not on file       Past Medical History:   Diagnosis Date    CAD (coronary artery disease)     follows with Dr. Keith Child yearly (next appt3/22/18)    Diabetes mellitus ([facility] [address]     Essential hypertension, benign 3/26/2014    History of colon polyps     Hyperinsulinism     Hyperlipidemia     Hypertension     Hypothyroidism        PHYSICAL EXAM:  CONSTITUTIONAL:  Well developed, well nourished    Vitals:    10/19/22 0811   BP: (!) 142/80   Pulse: 75   Resp: 16   Weight: 165 lb (74.8 kg)   Height: 4' 11\" (1.499 m)     HEAD & FACE: Normocephalic. Symmetric. EYES: No xanthelasma. Conjunctivae not injected. EARS, NOSE, MOUTH & THROAT: Good dentition. No oral pallor or cyanosis. NECK: No JVD at 30 degrees. No thyromegaly. RESPIRATORY: Clear to auscultation and percussion in all fields. No use of accessory muscle or intercostal retractions. CARDIOVASCULAR: Regular rate and rhythm. No lifts or thrills on palpitation. Auscultation with normal S1-S2 in intensity and splitting. No carotid bruits. Abdominal aorta not enlarged. Femoral arteries without bruits. Pedal pulses 2+. No edema. ABDOMEN: Soft without hepatic or splenic enlargement. No tenderness. MUSCULOSKELETAL: No kyphosis or scoliosis of the back. Good muscle strength and tone. No muscle atrophy. Normal gait and ability to undergo exercise stress testing. EXTREMITIES: No clubbing or cyanosis. SKIN: No Xanthomas or ulcerations. NEUROLOGIC: Oriented to time, place and person. Normal mood and affect. LYMPHATIC:  No palpable neck or supraclavicular nodes. No splenomegaly. EKG: Normal sinus rhythm. No change compared to prior tracing. QTc 424 ms. ASSESSMENT:                                                     ORDERS:       Diagnosis Orders   1. Coronary artery disease involving native coronary artery of native heart without angina pectoris  EKG 12 lead      2. Pure hypercholesterolemia  EKG 12 lead      3.  Essential hypertension, benign  EKG 12 lead        Above assessment cardiac issues stable. PLAN:   See above orders. Reviewed prior records and testing: LDL 88 on 1/24/22. Agree with recent increase in beta-blocker therapy. Assessment of medication compliance. Discussed issues that would prompt earlier evaluation. Same cardiac medications.     Follow-up office visit in 1/2 year

## 2022-11-22 ENCOUNTER — OFFICE VISIT (OUTPATIENT)
Dept: FAMILY MEDICINE CLINIC | Age: 77
End: 2022-11-22
Payer: MEDICARE

## 2022-11-22 VITALS
TEMPERATURE: 98 F | OXYGEN SATURATION: 96 % | HEART RATE: 78 BPM | DIASTOLIC BLOOD PRESSURE: 84 MMHG | HEIGHT: 59 IN | BODY MASS INDEX: 33.26 KG/M2 | SYSTOLIC BLOOD PRESSURE: 124 MMHG | WEIGHT: 165 LBS | RESPIRATION RATE: 16 BRPM

## 2022-11-22 DIAGNOSIS — I49.3 PVC (PREMATURE VENTRICULAR CONTRACTION): ICD-10-CM

## 2022-11-22 DIAGNOSIS — K21.9 GASTROESOPHAGEAL REFLUX DISEASE WITHOUT ESOPHAGITIS: ICD-10-CM

## 2022-11-22 DIAGNOSIS — E78.00 PURE HYPERCHOLESTEROLEMIA: ICD-10-CM

## 2022-11-22 DIAGNOSIS — Z12.31 ENCOUNTER FOR SCREENING MAMMOGRAM FOR BREAST CANCER: Primary | ICD-10-CM

## 2022-11-22 DIAGNOSIS — R07.89 OTHER CHEST PAIN: ICD-10-CM

## 2022-11-22 DIAGNOSIS — E06.3 HYPOTHYROIDISM DUE TO HASHIMOTO'S THYROIDITIS: ICD-10-CM

## 2022-11-22 DIAGNOSIS — E03.8 HYPOTHYROIDISM DUE TO HASHIMOTO'S THYROIDITIS: ICD-10-CM

## 2022-11-22 DIAGNOSIS — I10 ESSENTIAL HYPERTENSION, BENIGN: Chronic | ICD-10-CM

## 2022-11-22 DIAGNOSIS — E78.49 OTHER HYPERLIPIDEMIA: ICD-10-CM

## 2022-11-22 DIAGNOSIS — I25.119 CORONARY ARTERY DISEASE INVOLVING NATIVE CORONARY ARTERY WITH ANGINA PECTORIS, UNSPECIFIED WHETHER NATIVE OR TRANSPLANTED HEART (HCC): Chronic | ICD-10-CM

## 2022-11-22 PROCEDURE — 99214 OFFICE O/P EST MOD 30 MIN: CPT | Performed by: FAMILY MEDICINE

## 2022-11-22 PROCEDURE — 3078F DIAST BP <80 MM HG: CPT | Performed by: FAMILY MEDICINE

## 2022-11-22 PROCEDURE — G8427 DOCREV CUR MEDS BY ELIG CLIN: HCPCS | Performed by: FAMILY MEDICINE

## 2022-11-22 PROCEDURE — 1036F TOBACCO NON-USER: CPT | Performed by: FAMILY MEDICINE

## 2022-11-22 PROCEDURE — G8417 CALC BMI ABV UP PARAM F/U: HCPCS | Performed by: FAMILY MEDICINE

## 2022-11-22 PROCEDURE — 1090F PRES/ABSN URINE INCON ASSESS: CPT | Performed by: FAMILY MEDICINE

## 2022-11-22 PROCEDURE — G8484 FLU IMMUNIZE NO ADMIN: HCPCS | Performed by: FAMILY MEDICINE

## 2022-11-22 PROCEDURE — G8399 PT W/DXA RESULTS DOCUMENT: HCPCS | Performed by: FAMILY MEDICINE

## 2022-11-22 PROCEDURE — 3074F SYST BP LT 130 MM HG: CPT | Performed by: FAMILY MEDICINE

## 2022-11-22 PROCEDURE — 1123F ACP DISCUSS/DSCN MKR DOCD: CPT | Performed by: FAMILY MEDICINE

## 2022-11-22 RX ORDER — AMLODIPINE BESYLATE 5 MG/1
5 TABLET ORAL DAILY
Qty: 90 TABLET | Refills: 1 | Status: SHIPPED | OUTPATIENT
Start: 2022-11-22

## 2022-11-22 RX ORDER — NITROGLYCERIN 0.4 MG/1
0.4 TABLET SUBLINGUAL EVERY 5 MIN PRN
Qty: 25 TABLET | Refills: 3 | Status: SHIPPED | OUTPATIENT
Start: 2022-11-22

## 2022-11-22 RX ORDER — LEVOTHYROXINE SODIUM 75 MCG
75 TABLET ORAL EVERY MORNING
Qty: 90 TABLET | Refills: 1 | Status: SHIPPED | OUTPATIENT
Start: 2022-11-22

## 2022-11-22 RX ORDER — ROSUVASTATIN CALCIUM 10 MG/1
10 TABLET, COATED ORAL NIGHTLY
Qty: 90 TABLET | Refills: 3 | Status: SHIPPED | OUTPATIENT
Start: 2022-11-22

## 2022-11-22 RX ORDER — OLMESARTAN MEDOXOMIL 20 MG/1
40 TABLET ORAL EVERY MORNING
Qty: 180 TABLET | Refills: 1 | Status: SHIPPED | OUTPATIENT
Start: 2022-11-22

## 2022-11-22 RX ORDER — OMEPRAZOLE 40 MG/1
40 CAPSULE, DELAYED RELEASE ORAL EVERY MORNING
Qty: 90 CAPSULE | Refills: 3 | Status: SHIPPED | OUTPATIENT
Start: 2022-11-22

## 2022-11-22 RX ORDER — METOPROLOL SUCCINATE 25 MG/1
12.5 TABLET, EXTENDED RELEASE ORAL 2 TIMES DAILY
Qty: 90 TABLET | Refills: 3 | Status: SHIPPED | OUTPATIENT
Start: 2022-11-22

## 2022-11-22 ASSESSMENT — ENCOUNTER SYMPTOMS
COUGH: 0
CHEST TIGHTNESS: 0
VOMITING: 0
ABDOMINAL PAIN: 0
WHEEZING: 0
BLOOD IN STOOL: 0
EYES NEGATIVE: 1
PHOTOPHOBIA: 0
DIARRHEA: 0
SHORTNESS OF BREATH: 0
CONSTIPATION: 0
EYE REDNESS: 0

## 2022-11-22 NOTE — PROGRESS NOTES
OFFICE NOTE    22  Name: Dillon Gomez  :1945   Sex:female   Age:77 y.o. SUBJECTIVE  Chief Complaint   Patient presents with    6 Month Follow-Up       HPI comes in for checkup and refills    Review of Systems   Constitutional:  Positive for fatigue. Negative for appetite change, fever and unexpected weight change. HENT:  Positive for congestion and postnasal drip. Negative for ear pain and hearing loss. Eyes: Negative. Negative for photophobia, redness and visual disturbance. Respiratory:  Negative for cough, chest tightness, shortness of breath and wheezing. Cardiovascular:  Negative for chest pain and palpitations. Gastrointestinal:  Negative for abdominal pain, blood in stool, constipation, diarrhea and vomiting. Endocrine: Negative for cold intolerance, polydipsia and polyuria. Genitourinary:  Positive for frequency and urgency. Negative for dysuria and hematuria. Musculoskeletal:  Positive for arthralgias. Negative for gait problem and joint swelling. Skin:  Negative for rash and wound. Allergic/Immunologic: Negative for environmental allergies and food allergies. Neurological:  Negative for dizziness, tremors, seizures, weakness, numbness and headaches. Hematological:  Negative for adenopathy. Does not bruise/bleed easily. Psychiatric/Behavioral:  Negative for behavioral problems, confusion, dysphoric mood and sleep disturbance. The patient is nervous/anxious. Current Outpatient Medications:     amLODIPine (NORVASC) 5 MG tablet, Take 1 tablet by mouth daily At night.  Supercedes previous order for 1/2 tab at hs, Disp: 90 tablet, Rfl: 1    metoprolol succinate (TOPROL XL) 25 MG extended release tablet, Take 0.5 tablets by mouth 2 times daily 12.5mg BID, Disp: 90 tablet, Rfl: 3    nitroGLYCERIN (NITROSTAT) 0.4 MG SL tablet, Place 1 tablet under the tongue every 5 minutes as needed for Chest pain, Disp: 25 tablet, Rfl: 3    olmesartan (BENICAR) 20 MG tablet, Take 2 tablets by mouth every morning, Disp: 180 tablet, Rfl: 1    omeprazole (PRILOSEC) 40 MG delayed release capsule, Take 1 capsule by mouth every morning, Disp: 90 capsule, Rfl: 3    rosuvastatin (CRESTOR) 10 MG tablet, Take 1 tablet by mouth nightly, Disp: 90 tablet, Rfl: 3    SYNTHROID 75 MCG tablet, Take 1 tablet by mouth every morning, Disp: 90 tablet, Rfl: 1    vitamin D (CHOLECALCIFEROL) 125 MCG (5000 UT) CAPS capsule, Take 5,000 Units by mouth daily, Disp: , Rfl:     metFORMIN (GLUCOPHAGE) 500 MG tablet, 500 mg daily (with breakfast) , Disp: , Rfl:     vitamin E 400 UNIT capsule, Take 400 Units by mouth every morning, Disp: , Rfl:     vitamin C (ASCORBIC ACID) 500 MG tablet, Take 1,000 mg by mouth daily , Disp: , Rfl:     aspirin 81 MG chewable tablet, Take 81 mg by mouth every morning , Disp: , Rfl:     ALPRAZolam (XANAX) 0.25 MG tablet, Take 1 tablet by mouth 2 times daily as needed for Anxiety (as rescue) for up to 20 doses.  (Patient not taking: Reported on 11/22/2022), Disp: 20 tablet, Rfl: 1  Allergies   Allergen Reactions    Other Nausea Only     unknown antibiotic       Past Medical History:   Diagnosis Date    CAD (coronary artery disease)     follows with Dr. Jose Cruz yearly (next appt3/22/18)    Diabetes mellitus ([facility] [address]     Essential hypertension, benign 3/26/2014    History of colon polyps     Hyperinsulinism     Hyperlipidemia     Hypertension     Hypothyroidism      Past Surgical History:   Procedure Laterality Date    APPENDECTOMY      CARDIAC CATHETERIZATION  5/2011    CARDIAC CATHETERIZATION  1-    Dr. Brandon Azul  4/11    stent placement    CARPAL TUNNEL RELEASE Bilateral     CARPAL TUNNEL RELEASE Left 11/2013    COLONOSCOPY      COLONOSCOPY  07/02/2012    CORONARY ANGIOPLASTY      DIAGNOSTIC CARDIAC CATH LAB PROCEDURE      ENDOSCOPY, COLON, DIAGNOSTIC      EYE SURGERY Right     cataract    HYSTERECTOMY (CERVIX STATUS UNKNOWN)      Complete at age 27 HYSTERECTOMY, TOTAL ABDOMINAL (CERVIX REMOVED)      POLYPECTOMY  2012    PTCA  2011    WITH STENT 2 TIMES     UPPER GASTROINTESTINAL ENDOSCOPY  2012     Family History   Problem Relation Age of Onset    Heart Attack Mother     Heart Attack Father     Heart Surgery Brother     Heart Attack Sister      Social History       Tobacco History       Smoking Status  Former Smoking Start Date  11/12/1959 Quit Date  11/12/1974 Smoking Frequency  0.50 packs/day for 15.00 years (7.50 pk-yrs)    Smoking Tobacco Type  Cigarettes from 11/12/1959 to 11/12/1974      Smokeless Tobacco Use  Never              Alcohol History       Alcohol Use Status  No              Drug Use       Drug Use Status  No              Sexual Activity       Sexually Active  Not Currently Partners  Male                    OBJECTIVE  Vitals:    11/22/22 1447   BP: 124/84   Pulse: 78   Resp: 16   Temp: 98 °F (36.7 °C)   TempSrc: Temporal   SpO2: 96%   Weight: 165 lb (74.8 kg)   Height: 4' 11\" (1.499 m)        Body mass index is 33.33 kg/m². Orders Placed This Encounter   Procedures    [facility] RIMMA DIGITAL SCREEN BILATERAL     Further imaging can be completed per [address] protocol     Standing Status:   Future     Standing Expiration Date:   1/22/2024        EXAM   Physical Exam  Vitals and nursing note reviewed. Constitutional:       Appearance: Normal appearance. She is well-developed. She is obese. HENT:      Right Ear: Tympanic membrane, ear canal and external ear normal.      Left Ear: Tympanic membrane, ear canal and external ear normal.      Nose: Congestion and rhinorrhea present. Mouth/Throat:      Pharynx: Oropharynx is clear. No posterior oropharyngeal erythema. Eyes:      General: No scleral icterus. Conjunctiva/sclera: Conjunctivae normal.      Pupils: Pupils are equal, round, and reactive to light. Comments: May have slight icterus   Neck:      Thyroid: No thyroid mass or thyromegaly.       Vascular: No carotid bruit or JVD.      Trachea: Trachea normal.   Cardiovascular:      Rate and Rhythm: Normal rate and regular rhythm. Heart sounds: Normal heart sounds. No murmur heard. No gallop. Pulmonary:      Effort: Pulmonary effort is normal.      Breath sounds: Normal breath sounds. No wheezing, rhonchi or rales. Chest:      Chest wall: No tenderness. Abdominal:      General: Bowel sounds are normal. There is no distension. Palpations: Abdomen is soft. There is no mass. Tenderness: There is no abdominal tenderness. There is no guarding. Hernia: No hernia is present. Musculoskeletal:         General: No swelling or tenderness. Normal range of motion. Cervical back: Neck supple. No tenderness. Right lower leg: No edema. Left lower leg: No edema. Lymphadenopathy:      Cervical: No cervical adenopathy. Skin:     General: Skin is warm and dry. Capillary Refill: Capillary refill takes less than 2 seconds. Coloration: Skin is not pale. Findings: No bruising or rash. Neurological:      General: No focal deficit present. Mental Status: She is alert and oriented to person, place, and time. Sensory: No sensory deficit. Motor: No weakness or abnormal muscle tone. Coordination: Coordination normal.      Gait: Gait normal.   Psychiatric:         Mood and Affect: Mood normal.         Behavior: Behavior normal.         Camila was seen today for 6 month follow-up. Diagnoses and all orders for this visit:    Encounter for screening mammogram for breast cancer  -     [facility] RIMMA DIGITAL SCREEN BILATERAL; Future    Essential hypertension, benign  -     amLODIPine (NORVASC) 5 MG tablet; Take 1 tablet by mouth daily At night. Supercedes previous order for 1/2 tab at hs  -     metoprolol succinate (TOPROL XL) 25 MG extended release tablet;  Take 0.5 tablets by mouth 2 times daily 12.5mg BID  -     nitroGLYCERIN (NITROSTAT) 0.4 MG SL tablet; Place 1 tablet under the tongue every 5 minutes as needed for Chest pain  -     olmesartan (BENICAR) 20 MG tablet; Take 2 tablets by mouth every morning  Well controlled, no changes made  Coronary artery disease involving native coronary artery with angina pectoris, unspecified whether native or transplanted heart (HCC)  -     nitroGLYCERIN (NITROSTAT) 0.4 MG SL tablet; Place 1 tablet under the tongue every 5 minutes as needed for Chest pain  Denies current ischemic symptoms, sees Cardiology regularly  Pure hypercholesterolemia  -     nitroGLYCERIN (NITROSTAT) 0.4 MG SL tablet; Place 1 tablet under the tongue every 5 minutes as needed for Chest pain    PVC (premature ventricular contraction)  -     nitroGLYCERIN (NITROSTAT) 0.4 MG SL tablet; Place 1 tablet under the tongue every 5 minutes as needed for Chest pain    Gastroesophageal reflux disease without esophagitis  -     omeprazole (PRILOSEC) 40 MG delayed release capsule; Take 1 capsule by mouth every morning    Other hyperlipidemia  -     rosuvastatin (CRESTOR) 10 MG tablet; Take 1 tablet by mouth nightly    Hypothyroidism due to Hashimoto's thyroiditis  -     SYNTHROID 75 MCG tablet; Take 1 tablet by mouth every morning        No follow-ups on file.     Electronically signed by Pascual Miller MD on 11/22/22 at 3:20 PM EST

## 2023-01-26 ENCOUNTER — OFFICE VISIT (OUTPATIENT)
Dept: FAMILY MEDICINE CLINIC | Age: 78
End: 2023-01-26

## 2023-01-26 VITALS
SYSTOLIC BLOOD PRESSURE: 160 MMHG | DIASTOLIC BLOOD PRESSURE: 80 MMHG | HEART RATE: 80 BPM | OXYGEN SATURATION: 96 % | TEMPERATURE: 97.5 F

## 2023-01-26 DIAGNOSIS — J34.89 SINUS DRAINAGE: ICD-10-CM

## 2023-01-26 DIAGNOSIS — J01.90 ACUTE SINUSITIS, RECURRENCE NOT SPECIFIED, UNSPECIFIED LOCATION: ICD-10-CM

## 2023-01-26 DIAGNOSIS — J02.9 SORE THROAT: ICD-10-CM

## 2023-01-26 RX ORDER — AMOXICILLIN AND CLAVULANATE POTASSIUM 875; 125 MG/1; MG/1
1 TABLET, FILM COATED ORAL 2 TIMES DAILY
Qty: 14 TABLET | Refills: 0 | Status: SHIPPED | OUTPATIENT
Start: 2023-01-26 | End: 2023-02-02

## 2023-01-26 RX ORDER — FLUTICASONE PROPIONATE 50 MCG
2 SPRAY, SUSPENSION (ML) NASAL DAILY
Qty: 16 G | Refills: 0 | Status: SHIPPED | OUTPATIENT
Start: 2023-01-26

## 2023-01-26 ASSESSMENT — ENCOUNTER SYMPTOMS
WHEEZING: 0
ABDOMINAL PAIN: 0
SINUS PRESSURE: 1
CHEST TIGHTNESS: 0
EYES NEGATIVE: 1
VOMITING: 0
COUGH: 1
SORE THROAT: 1
NAUSEA: 0
SHORTNESS OF BREATH: 0

## 2023-01-27 NOTE — PROGRESS NOTES
[address] [place]     23  [facility] : 1945 Sex: female  Age: 65 y.o. Chief Complaint   Patient presents with    Pharyngitis     Runny nose-tested negative for covid at home  Symptoms started 5 days ago       HPI    Presents to express care today complaining of sore throat, sinus drainage along with mild cough, runny nose over the past 5 days. Denies shortness of breath, fever or chills. No recent exposures. She is vaccinated to [employer]. Review of Systems   Constitutional:  Negative for chills and fever. HENT:  Positive for congestion, postnasal drip, sinus pressure and sore throat. Negative for ear pain. Eyes: Negative. Respiratory:  Positive for cough. Negative for chest tightness, shortness of breath and wheezing. Cardiovascular:  Negative for chest pain. Gastrointestinal:  Negative for abdominal pain, nausea and vomiting. Musculoskeletal:  Negative for myalgias. Neurological:  Negative for headaches. REST OF PERTINENT ROS GONE OVER AND WAS NEGATIVE. Current Outpatient Medications:     amoxicillin-clavulanate (AUGMENTIN) 875-125 MG per tablet, Take 1 tablet by mouth 2 times daily for 7 days, Disp: 14 tablet, Rfl: 0    fluticasone (FLONASE) 50 MCG/ACT nasal spray, 2 sprays by Each Nostril route daily, Disp: 16 g, Rfl: 0    amLODIPine (NORVASC) 5 MG tablet, Take 1 tablet by mouth daily At night.  Supercedes previous order for 1/2 tab at hs, Disp: 90 tablet, Rfl: 1    metoprolol succinate (TOPROL XL) 25 MG extended release tablet, Take 0.5 tablets by mouth 2 times daily 12.5mg BID, Disp: 90 tablet, Rfl: 3    olmesartan (BENICAR) 20 MG tablet, Take 2 tablets by mouth every morning, Disp: 180 tablet, Rfl: 1    rosuvastatin (CRESTOR) 10 MG tablet, Take 1 tablet by mouth nightly, Disp: 90 tablet, Rfl: 3    SYNTHROID 75 MCG tablet, Take 1 tablet by mouth every morning, Disp: 90 tablet, Rfl: 1    vitamin D (CHOLECALCIFEROL) 125 MCG (5000 UT) CAPS capsule, Take 5,000 Units by mouth daily, Disp: , Rfl:     metFORMIN (GLUCOPHAGE) 500 MG tablet, 500 mg daily (with breakfast) , Disp: , Rfl:     vitamin E 400 UNIT capsule, Take 400 Units by mouth every morning, Disp: , Rfl:     vitamin C (ASCORBIC ACID) 500 MG tablet, Take 1,000 mg by mouth daily , Disp: , Rfl:     nitroGLYCERIN (NITROSTAT) 0.4 MG SL tablet, Place 1 tablet under the tongue every 5 minutes as needed for Chest pain, Disp: 25 tablet, Rfl: 3    omeprazole (PRILOSEC) 40 MG delayed release capsule, Take 1 capsule by mouth every morning, Disp: 90 capsule, Rfl: 3    aspirin 81 MG chewable tablet, Take 81 mg by mouth every morning , Disp: , Rfl:   Allergies   Allergen Reactions    Other Nausea Only     unknown antibiotic       Past Medical History:   Diagnosis Date    CAD (coronary artery disease)     follows with Dr. Cheng Crenshaw yearly (next appt3/22/18)    Diabetes mellitus ([facility] [address]     Essential hypertension, benign 3/26/2014    History of colon polyps     Hyperinsulinism     Hyperlipidemia     Hypertension     Hypothyroidism      Past Surgical History:   Procedure Laterality Date    APPENDECTOMY      CARDIAC CATHETERIZATION  5/2011    CARDIAC CATHETERIZATION  1-    Dr. Rosemary Velasco  4/11    stent placement    CARPAL TUNNEL RELEASE Bilateral     CARPAL TUNNEL RELEASE Left 11/2013    COLONOSCOPY      COLONOSCOPY  07/02/2012    CORONARY ANGIOPLASTY      DIAGNOSTIC CARDIAC CATH LAB PROCEDURE      ENDOSCOPY, COLON, DIAGNOSTIC      EYE SURGERY Right     cataract    HYSTERECTOMY (CERVIX STATUS UNKNOWN)      Complete at age 27    HYSTERECTOMY, TOTAL ABDOMINAL (CERVIX REMOVED)      POLYPECTOMY  2012    PTCA  2011    WITH STENT 2 TIMES     UPPER GASTROINTESTINAL ENDOSCOPY  2012     Family History   Problem Relation Age of Onset    Heart Attack Mother     Heart Attack Father     Heart Surgery Brother     Heart Attack Sister      Social History     Socioeconomic History    Marital status:  Spouse name: Not on file    Number of children: 0    Years of education: 14    Highest education level: Not on file   Occupational History    Occupation: retired   Tobacco Use    Smoking status: Former     Packs/day: 0.50     Years: 15.00     Pack years: 7.50     Types: Cigarettes     Start date: 1959     Quit date: 1974     Years since quittin.2    Smokeless tobacco: Never   Vaping Use    Vaping Use: Never used   Substance and Sexual Activity    Alcohol use: No    Drug use: No    Sexual activity: Not Currently     Partners: Male   Other Topics Concern    Not on file   Social History Narrative    Not on file     Social Determinants of Health     Financial Resource Strain: Low Risk     Difficulty of Paying Living Expenses: Not hard at all   Food Insecurity: No Food Insecurity    Worried About Running Out of Food in the Last Year: Never true    Ran Out of Food in the Last Year: Never true   Transportation Needs: Not on file   Physical Activity: Not on file   Stress: Not on file   Social Connections: Not on file   Intimate Partner Violence: Not on file   Housing Stability: Not on file       Vitals:    23 0936   BP: (!) 160/80   Pulse: 80   Temp: 97.5 °F (36.4 °C)   SpO2: 96%       Physical Exam  Vitals and nursing note reviewed. Constitutional:       General: She is not in acute distress. Appearance: She is well-developed. HENT:      Head: Normocephalic and atraumatic. Right Ear: Tympanic membrane, ear canal and external ear normal.      Left Ear: Tympanic membrane, ear canal and external ear normal.      Nose: Nose normal.      Mouth/Throat:      Mouth: Mucous membranes are moist.      Pharynx: Oropharynx is clear. Posterior oropharyngeal erythema present. No oropharyngeal exudate. Comments: Thick white mucus draining posterior pharynx  Cardiovascular:      Rate and Rhythm: Normal rate and regular rhythm. Heart sounds: Normal heart sounds. No murmur heard.   Pulmonary: Effort: Pulmonary effort is normal. No respiratory distress. Breath sounds: Normal breath sounds. No wheezing, rhonchi or rales. Musculoskeletal:      Cervical back: Normal range of motion and neck supple. No tenderness. Lymphadenopathy:      Cervical: No cervical adenopathy. Skin:     General: Skin is warm and dry. Neurological:      Mental Status: She is alert and oriented to person, place, and time. Psychiatric:         Mood and Affect: Mood normal.         Behavior: Behavior normal.         Thought Content: Thought content normal.         Judgment: Judgment normal.               Assessment and Plan:  Camila was seen today for pharyngitis. Diagnoses and all orders for this visit:    Acute sinusitis, recurrence not specified, unspecified location    Sinus drainage    Sore throat    Other orders  -     amoxicillin-clavulanate (AUGMENTIN) 875-125 MG per tablet; Take 1 tablet by mouth 2 times daily for 7 days  -     fluticasone (FLONASE) 50 MCG/ACT nasal spray; 2 sprays by Each Nostril route daily    Plan: I am going to treat her as a sinusitis at this time with Augmentin and fluticasone nasal spray. Did not use any steroids at this time with her diabetes and the fact that it shoots her sugars very high. I asked her to push fluids. Warned of potential side effects of medication. Probiotic. Follow-up with PCP. Notify us if not improving. Return for fu pcp. Seen By:  Evans Bullock MD      *Document was created using voice recognition software. Note was reviewed however may contain grammatical errors.

## 2023-02-24 ENCOUNTER — OFFICE VISIT (OUTPATIENT)
Dept: FAMILY MEDICINE CLINIC | Age: 78
End: 2023-02-24

## 2023-02-24 VITALS
SYSTOLIC BLOOD PRESSURE: 148 MMHG | DIASTOLIC BLOOD PRESSURE: 92 MMHG | HEART RATE: 87 BPM | OXYGEN SATURATION: 96 % | WEIGHT: 158 LBS | TEMPERATURE: 96.8 F | BODY MASS INDEX: 31.91 KG/M2

## 2023-02-24 DIAGNOSIS — J01.90 ACUTE SINUSITIS, RECURRENCE NOT SPECIFIED, UNSPECIFIED LOCATION: ICD-10-CM

## 2023-02-24 DIAGNOSIS — J34.89 SINUS DRAINAGE: Primary | ICD-10-CM

## 2023-02-24 DIAGNOSIS — R05.9 COUGH, UNSPECIFIED TYPE: ICD-10-CM

## 2023-02-24 RX ORDER — DOXYCYCLINE HYCLATE 100 MG
100 TABLET ORAL 2 TIMES DAILY
Qty: 20 TABLET | Refills: 0 | Status: SHIPPED | OUTPATIENT
Start: 2023-02-24 | End: 2023-03-06

## 2023-02-24 ASSESSMENT — ENCOUNTER SYMPTOMS
NAUSEA: 0
ABDOMINAL PAIN: 0
COUGH: 1
VOMITING: 0
WHEEZING: 0
SINUS PRESSURE: 1
DIARRHEA: 0
SHORTNESS OF BREATH: 0
EYES NEGATIVE: 1
CHEST TIGHTNESS: 0
SORE THROAT: 1

## 2023-02-24 NOTE — PROGRESS NOTES
[address] [place]     23  [facility] : 1945 Sex: female  Age: 65 y.o. Chief Complaint   Patient presents with    Pharyngitis     Onset 1 week    Cough    Congestion       HPI    Patient presents today to express care complaining of sore throat, cough, head congestion, sinus drainage. States she has yellow mucus drainage. I actually saw her about a month ago for sinus infection treated with Augmentin which she states she improved from but has come back now in the past several days. She denies fever or chills. Denies shortness of breath. Pulse ox on room air is 96%. No other recent exposures. She does live in [place] and is about 2 miles from the [facility]. She was very teary-eyed concerned about that situation states has been somewhat depressed over this. She did test for COVID about 2 days ago at home and it was negative. Review of Systems   Constitutional:  Negative for chills and fever. HENT:  Positive for congestion, postnasal drip, sinus pressure and sore throat. Negative for ear pain. Eyes: Negative. Respiratory:  Positive for cough. Negative for chest tightness, shortness of breath and wheezing. Cardiovascular:  Negative for chest pain. Gastrointestinal:  Negative for abdominal pain, diarrhea, nausea and vomiting. Musculoskeletal:  Negative for myalgias. Neurological:  Negative for headaches. REST OF PERTINENT ROS GONE OVER AND WAS NEGATIVE. Current Outpatient Medications:     doxycycline hyclate (VIBRA-TABS) 100 MG tablet, Take 1 tablet by mouth 2 times daily for 10 days, Disp: 20 tablet, Rfl: 0    fluticasone (FLONASE) 50 MCG/ACT nasal spray, 2 sprays by Each Nostril route daily, Disp: 16 g, Rfl: 0    amLODIPine (NORVASC) 5 MG tablet, Take 1 tablet by mouth daily At night.  Supercedes previous order for 1/2 tab at hs, Disp: 90 tablet, Rfl: 1    metoprolol succinate (TOPROL XL) 25 MG extended release tablet, Take 0.5 tablets by mouth 2 times daily 12.5mg BID, Disp: 90 tablet, Rfl: 3    nitroGLYCERIN (NITROSTAT) 0.4 MG SL tablet, Place 1 tablet under the tongue every 5 minutes as needed for Chest pain, Disp: 25 tablet, Rfl: 3    olmesartan (BENICAR) 20 MG tablet, Take 2 tablets by mouth every morning, Disp: 180 tablet, Rfl: 1    omeprazole (PRILOSEC) 40 MG delayed release capsule, Take 1 capsule by mouth every morning, Disp: 90 capsule, Rfl: 3    rosuvastatin (CRESTOR) 10 MG tablet, Take 1 tablet by mouth nightly, Disp: 90 tablet, Rfl: 3    SYNTHROID 75 MCG tablet, Take 1 tablet by mouth every morning, Disp: 90 tablet, Rfl: 1    vitamin D (CHOLECALCIFEROL) 125 MCG (5000 UT) CAPS capsule, Take 5,000 Units by mouth daily, Disp: , Rfl:     metFORMIN (GLUCOPHAGE) 500 MG tablet, 500 mg daily (with breakfast) , Disp: , Rfl:     vitamin E 400 UNIT capsule, Take 400 Units by mouth every morning, Disp: , Rfl:     vitamin C (ASCORBIC ACID) 500 MG tablet, Take 1,000 mg by mouth daily , Disp: , Rfl:     aspirin 81 MG chewable tablet, Take 81 mg by mouth every morning , Disp: , Rfl:   Allergies   Allergen Reactions    Other Nausea Only     unknown antibiotic       Past Medical History:   Diagnosis Date    CAD (coronary artery disease)     follows with Dr. Alex Ruiz yearly (next appt3/22/18)    Diabetes mellitus ([facility] [address]     Essential hypertension, benign 3/26/2014    History of colon polyps     Hyperinsulinism     Hyperlipidemia     Hypertension     Hypothyroidism      Past Surgical History:   Procedure Laterality Date    APPENDECTOMY      CARDIAC CATHETERIZATION  5/2011    CARDIAC CATHETERIZATION  1-    Dr. Tee Anderson  4/11    stent placement    CARPAL TUNNEL RELEASE Bilateral     CARPAL TUNNEL RELEASE Left 11/2013    COLONOSCOPY      COLONOSCOPY  07/02/2012    CORONARY ANGIOPLASTY      DIAGNOSTIC CARDIAC CATH LAB PROCEDURE      ENDOSCOPY, COLON, DIAGNOSTIC      EYE SURGERY Right     cataract    HYSTERECTOMY (CERVIX STATUS UNKNOWN)      Complete at age 27    HYSTERECTOMY, TOTAL ABDOMINAL (CERVIX REMOVED)      POLYPECTOMY  2012    PTCA  2011    WITH STENT 2 TIMES     UPPER GASTROINTESTINAL ENDOSCOPY  2012     Family History   Problem Relation Age of Onset    Heart Attack Mother     Heart Attack Father     Heart Attack Sister     Heart Surgery Brother     Breast Cancer Maternal Aunt      Social History     Socioeconomic History    Marital status:      Spouse name: Not on file    Number of children: 0    Years of education: 14    Highest education level: Not on file   Occupational History    Occupation: retired   Tobacco Use    Smoking status: Former     Packs/day: 0.50     Years: 15.00     Pack years: 7.50     Types: Cigarettes     Start date: 1959     Quit date: 1974     Years since quittin.3    Smokeless tobacco: Never   Vaping Use    Vaping Use: Never used   Substance and Sexual Activity    Alcohol use: No    Drug use: No    Sexual activity: Not Currently     Partners: Male   Other Topics Concern    Not on file   Social History Narrative    Not on file     Social Determinants of Health     Financial Resource Strain: Low Risk     Difficulty of Paying Living Expenses: Not hard at all   Food Insecurity: No Food Insecurity    Worried About Running Out of Food in the Last Year: Never true    Ran Out of Food in the Last Year: Never true   Transportation Needs: Not on file   Physical Activity: Not on file   Stress: Not on file   Social Connections: Not on file   Intimate Partner Violence: Not on file   Housing Stability: Not on file       Vitals:    23 0820   BP: (!) 148/92   Pulse: 87   Temp: 96.8 °F (36 °C)   SpO2: 96%   Weight: 158 lb (71.7 kg)       Physical Exam  Vitals and nursing note reviewed. Constitutional:       General: She is not in acute distress. Appearance: She is well-developed. HENT:      Head: Normocephalic and atraumatic.       Right Ear: Tympanic membrane, ear canal and external ear normal.      Left Ear: Tympanic membrane, ear canal and external ear normal.      Nose: Nose normal.      Comments: Mild tenderness to palpation over maxillary sinus bilaterally. Mouth/Throat:      Mouth: Mucous membranes are moist.      Pharynx: Oropharynx is clear. Posterior oropharyngeal erythema present. No oropharyngeal exudate. Comments: Thick white mucus draining posterior pharynx  Cardiovascular:      Rate and Rhythm: Normal rate and regular rhythm. Heart sounds: Normal heart sounds. No murmur heard. Pulmonary:      Effort: Pulmonary effort is normal. No respiratory distress. Breath sounds: Normal breath sounds. No wheezing, rhonchi or rales. Musculoskeletal:      Cervical back: Normal range of motion and neck supple. No tenderness. Lymphadenopathy:      Cervical: No cervical adenopathy. Skin:     General: Skin is warm and dry. Neurological:      Mental Status: She is alert and oriented to person, place, and time. Psychiatric:         Mood and Affect: Mood normal.         Behavior: Behavior normal.         Thought Content: Thought content normal.         Judgment: Judgment normal.               Assessment and Plan:  Camila was seen today for pharyngitis, cough and congestion. Diagnoses and all orders for this visit:    Sinus drainage    Acute sinusitis, recurrence not specified, unspecified location    Cough, unspecified type    Other orders  -     doxycycline hyclate (VIBRA-TABS) 100 MG tablet; Take 1 tablet by mouth 2 times daily for 10 days    Plan: I will retreat at this time with doxycycline. Continue her Flonase. We will avoid prednisone with her diabetes. Warned of potential side effects of medication. Probiotic. Push fluids. I asked her to follow-up with Dr. Angelika Mckinley in the next 2 weeks with consideration for maybe antidepressant medication given her home situation. She is not suicidal at this time. Return in about 2 weeks (around 3/10/2023).     Seen By:  Skip Carias MD      *Document was created using voice recognition software. Note was reviewed however may contain grammatical errors.

## 2023-02-27 LAB — DIABETIC RETINOPATHY: NEGATIVE

## 2023-03-07 ENCOUNTER — OFFICE VISIT (OUTPATIENT)
Dept: FAMILY MEDICINE CLINIC | Age: 78
End: 2023-03-07
Payer: MEDICARE

## 2023-03-07 DIAGNOSIS — I10 ESSENTIAL HYPERTENSION, BENIGN: Primary | Chronic | ICD-10-CM

## 2023-03-07 DIAGNOSIS — I25.119 CORONARY ARTERY DISEASE INVOLVING NATIVE CORONARY ARTERY WITH ANGINA PECTORIS, UNSPECIFIED WHETHER NATIVE OR TRANSPLANTED HEART (HCC): ICD-10-CM

## 2023-03-07 DIAGNOSIS — E11.9 TYPE 2 DIABETES MELLITUS WITHOUT COMPLICATION, WITHOUT LONG-TERM CURRENT USE OF INSULIN (HCC): ICD-10-CM

## 2023-03-07 DIAGNOSIS — E03.4 HYPOTHYROIDISM DUE TO ACQUIRED ATROPHY OF THYROID: ICD-10-CM

## 2023-03-07 DIAGNOSIS — E78.49 OTHER HYPERLIPIDEMIA: ICD-10-CM

## 2023-03-07 PROCEDURE — 3078F DIAST BP <80 MM HG: CPT | Performed by: FAMILY MEDICINE

## 2023-03-07 PROCEDURE — 1036F TOBACCO NON-USER: CPT | Performed by: FAMILY MEDICINE

## 2023-03-07 PROCEDURE — 1123F ACP DISCUSS/DSCN MKR DOCD: CPT | Performed by: FAMILY MEDICINE

## 2023-03-07 PROCEDURE — G8427 DOCREV CUR MEDS BY ELIG CLIN: HCPCS | Performed by: FAMILY MEDICINE

## 2023-03-07 PROCEDURE — G8399 PT W/DXA RESULTS DOCUMENT: HCPCS | Performed by: FAMILY MEDICINE

## 2023-03-07 PROCEDURE — 3044F HG A1C LEVEL LT 7.0%: CPT | Performed by: FAMILY MEDICINE

## 2023-03-07 PROCEDURE — G8417 CALC BMI ABV UP PARAM F/U: HCPCS | Performed by: FAMILY MEDICINE

## 2023-03-07 PROCEDURE — 99214 OFFICE O/P EST MOD 30 MIN: CPT | Performed by: FAMILY MEDICINE

## 2023-03-07 PROCEDURE — 1090F PRES/ABSN URINE INCON ASSESS: CPT | Performed by: FAMILY MEDICINE

## 2023-03-07 PROCEDURE — G8484 FLU IMMUNIZE NO ADMIN: HCPCS | Performed by: FAMILY MEDICINE

## 2023-03-07 PROCEDURE — 3074F SYST BP LT 130 MM HG: CPT | Performed by: FAMILY MEDICINE

## 2023-03-07 SDOH — ECONOMIC STABILITY: INCOME INSECURITY: HOW HARD IS IT FOR YOU TO PAY FOR THE VERY BASICS LIKE FOOD, HOUSING, MEDICAL CARE, AND HEATING?: PATIENT DECLINED

## 2023-03-07 SDOH — ECONOMIC STABILITY: HOUSING INSECURITY
IN THE LAST 12 MONTHS, WAS THERE A TIME WHEN YOU DID NOT HAVE A STEADY PLACE TO SLEEP OR SLEPT IN A SHELTER (INCLUDING NOW)?: PATIENT REFUSED

## 2023-03-07 SDOH — ECONOMIC STABILITY: FOOD INSECURITY: WITHIN THE PAST 12 MONTHS, YOU WORRIED THAT YOUR FOOD WOULD RUN OUT BEFORE YOU GOT MONEY TO BUY MORE.: PATIENT DECLINED

## 2023-03-07 SDOH — ECONOMIC STABILITY: FOOD INSECURITY: WITHIN THE PAST 12 MONTHS, THE FOOD YOU BOUGHT JUST DIDN'T LAST AND YOU DIDN'T HAVE MONEY TO GET MORE.: PATIENT DECLINED

## 2023-03-07 ASSESSMENT — PATIENT HEALTH QUESTIONNAIRE - PHQ9
SUM OF ALL RESPONSES TO PHQ QUESTIONS 1-9: 0
1. LITTLE INTEREST OR PLEASURE IN DOING THINGS: 0
2. FEELING DOWN, DEPRESSED OR HOPELESS: 0
SUM OF ALL RESPONSES TO PHQ QUESTIONS 1-9: 0
SUM OF ALL RESPONSES TO PHQ9 QUESTIONS 1 & 2: 0

## 2023-03-07 NOTE — PROGRESS NOTES
OFFICE NOTE    3/7/23  Name: Art Modi  :1945   Sex:female   Age:77 y.o. SUBJECTIVE  Chief Complaint   Patient presents with    Other     2 week follow up [facility] She was also stating she is more anxious. HPI Says she lives about 2 miles from [facility] and has been upset and depressed about all the commotion. Had symptoms c/w sinus infection. Was seen by Dr. Saulo Bourgeois in [facility] and he suggested she talk to me. Says she believes she is feeling better    Review of Systems   Constitutional:  Positive for activity change and fatigue. Negative for appetite change, fever and unexpected weight change. HENT:  Positive for postnasal drip. Negative for sinus pressure and sore throat. Eyes:  Negative for photophobia, redness and visual disturbance. Respiratory:  Negative for cough and shortness of breath. Cardiovascular:  Negative for chest pain and palpitations. Gastrointestinal:  Negative for abdominal pain and blood in stool. Neurological:  Positive for headaches. Negative for tremors, seizures, syncope, weakness and numbness. Psychiatric/Behavioral:  Negative for dysphoric mood and sleep disturbance. The patient is nervous/anxious. Current Outpatient Medications:     amLODIPine (NORVASC) 5 MG tablet, Take 1 tablet by mouth daily At night.  Supercedes previous order for 1/2 tab at hs, Disp: 90 tablet, Rfl: 1    metoprolol succinate (TOPROL XL) 25 MG extended release tablet, Take 0.5 tablets by mouth 2 times daily 12.5mg BID, Disp: 90 tablet, Rfl: 3    nitroGLYCERIN (NITROSTAT) 0.4 MG SL tablet, Place 1 tablet under the tongue every 5 minutes as needed for Chest pain, Disp: 25 tablet, Rfl: 3    olmesartan (BENICAR) 20 MG tablet, Take 2 tablets by mouth every morning, Disp: 180 tablet, Rfl: 1    omeprazole (PRILOSEC) 40 MG delayed release capsule, Take 1 capsule by mouth every morning, Disp: 90 capsule, Rfl: 3    rosuvastatin (CRESTOR) 10 MG tablet, Take 1 tablet by mouth nightly, Disp: 90 tablet, Rfl: 3    SYNTHROID 75 MCG tablet, Take 1 tablet by mouth every morning, Disp: 90 tablet, Rfl: 1    vitamin D (CHOLECALCIFEROL) 125 MCG (5000 UT) CAPS capsule, Take 5,000 Units by mouth daily, Disp: , Rfl:     metFORMIN (GLUCOPHAGE) 500 MG tablet, 500 mg daily (with breakfast) , Disp: , Rfl:     vitamin E 400 UNIT capsule, Take 400 Units by mouth every morning, Disp: , Rfl:     vitamin C (ASCORBIC ACID) 500 MG tablet, Take 1,000 mg by mouth daily , Disp: , Rfl:     aspirin 81 MG chewable tablet, Take 81 mg by mouth every morning , Disp: , Rfl:     fluticasone (FLONASE) 50 MCG/ACT nasal spray, 2 sprays by Each Nostril route daily (Patient not taking: Reported on 3/7/2023), Disp: 16 g, Rfl: 0  Allergies   Allergen Reactions    Other Nausea Only     unknown antibiotic       Past Medical History:   Diagnosis Date    CAD (coronary artery disease)     follows with Dr. Sade Watts yearly (next appt3/22/18)    Diabetes mellitus ([facility] [address]     Essential hypertension, benign 3/26/2014    History of colon polyps     Hyperinsulinism     Hyperlipidemia     Hypertension     Hypothyroidism      Past Surgical History:   Procedure Laterality Date    APPENDECTOMY      CARDIAC CATHETERIZATION  5/2011    CARDIAC CATHETERIZATION  1-    Dr. Chuckie Bolaños  4/11    stent placement    CARPAL TUNNEL RELEASE Bilateral     CARPAL TUNNEL RELEASE Left 11/2013    COLONOSCOPY      COLONOSCOPY  07/02/2012    CORONARY ANGIOPLASTY      DIAGNOSTIC CARDIAC CATH LAB PROCEDURE      ENDOSCOPY, COLON, DIAGNOSTIC      EYE SURGERY Right     cataract    HYSTERECTOMY (CERVIX STATUS UNKNOWN)      Complete at age 27    HYSTERECTOMY, TOTAL ABDOMINAL (CERVIX REMOVED)      POLYPECTOMY  2012    PTCA  2011    WITH STENT 2 TIMES     UPPER GASTROINTESTINAL ENDOSCOPY  2012     Family History   Problem Relation Age of Onset    Heart Attack Mother     Heart Attack Father Heart Attack Sister     Heart Surgery Brother     Breast Cancer Maternal Aunt      Social History       Tobacco History       Smoking Status  Former Smoking Start Date  11/12/1959 Quit Date  11/12/1974 Smoking Frequency  0.50 packs/day for 15.00 years (7.50 pk-yrs)    Smoking Tobacco Type  Cigarettes from 11/12/1959 to 11/12/1974      Smokeless Tobacco Use  Never              Alcohol History       Alcohol Use Status  No              Drug Use       Drug Use Status  No              Sexual Activity       Sexually Active  Not Currently Partners  Male                    OBJECTIVE  Vitals:    03/07/23 1437 03/07/23 1530   BP: (!) 140/80 128/78   Pulse: 78    Resp: 18    Temp: 97.7 °F (36.5 °C)    TempSrc: Temporal    SpO2: 97%    Weight: 165 lb (74.8 kg)    Height: 4' 11\" (1.499 m)         Body mass index is 33.33 kg/m². No orders of the defined types were placed in this encounter. EXAM   Physical Exam  Vitals and nursing note reviewed. Constitutional:       Appearance: Normal appearance. She is obese. HENT:      Right Ear: Tympanic membrane and external ear normal.      Left Ear: Tympanic membrane normal.      Nose: Congestion and rhinorrhea present. Mouth/Throat:      Pharynx: No posterior oropharyngeal erythema. Eyes:      Pupils: Pupils are equal, round, and reactive to light. Cardiovascular:      Rate and Rhythm: Normal rate and regular rhythm. Heart sounds: No murmur heard. Pulmonary:      Effort: Pulmonary effort is normal.      Breath sounds: No wheezing, rhonchi or rales. Abdominal:      General: Bowel sounds are normal.      Palpations: There is no mass. Tenderness: There is no abdominal tenderness. Musculoskeletal:      Cervical back: No tenderness. Lymphadenopathy:      Cervical: No cervical adenopathy. Skin:     Coloration: Skin is not jaundiced or pale. Findings: No bruising or rash. Neurological:      General: No focal deficit present.       Mental Status: She is alert and oriented to person, place, and time. Psychiatric:         Mood and Affect: Mood normal.         Behavior: Behavior normal.         Camila was seen today for other. Diagnoses and all orders for this visit:    Essential hypertension, benign  Came down on repeat. Admits she has been anxious with her normal life disrupted by all of this. Type 2 diabetes mellitus without complication, without long-term current use of insulin ([facility])  A1C 6.7 6 weeks ago, no changes made    Coronary artery disease involving native coronary artery with angina pectoris, unspecified whether native or transplanted heart ([facility] [address]  Does not report any ischemic symptoms  Hypothyroidism due to acquired atrophy of thyroid  Euthyroid status no changes made  Other hyperlipidemia  On moderate dose statin, crestor 10 mg per day and labs good  Adjustment reaction with anxiety possibly a little PTSD. At this point needs to get out a little more and see her friends etc. She will get through this. Offered counselling, do not believe she needs medication at this point      No follow-ups on file.     Electronically signed by Jasson Jaime MD on 3/7/23 at 3:19 PM EST

## 2023-03-08 VITALS
TEMPERATURE: 97.7 F | OXYGEN SATURATION: 97 % | SYSTOLIC BLOOD PRESSURE: 128 MMHG | RESPIRATION RATE: 18 BRPM | BODY MASS INDEX: 33.26 KG/M2 | HEART RATE: 78 BPM | HEIGHT: 59 IN | WEIGHT: 165 LBS | DIASTOLIC BLOOD PRESSURE: 78 MMHG

## 2023-03-08 ASSESSMENT — ENCOUNTER SYMPTOMS
SHORTNESS OF BREATH: 0
SORE THROAT: 0
PHOTOPHOBIA: 0
SINUS PRESSURE: 0
EYE REDNESS: 0
ABDOMINAL PAIN: 0
COUGH: 0
BLOOD IN STOOL: 0

## 2023-03-30 ENCOUNTER — TELEPHONE (OUTPATIENT)
Dept: FAMILY MEDICINE CLINIC | Age: 78
End: 2023-03-30

## 2023-03-31 ENCOUNTER — OFFICE VISIT (OUTPATIENT)
Dept: FAMILY MEDICINE CLINIC | Age: 78
End: 2023-03-31

## 2023-03-31 VITALS
TEMPERATURE: 98.4 F | SYSTOLIC BLOOD PRESSURE: 160 MMHG | DIASTOLIC BLOOD PRESSURE: 82 MMHG | RESPIRATION RATE: 17 BRPM | BODY MASS INDEX: 32.58 KG/M2 | HEIGHT: 59 IN | HEART RATE: 91 BPM | WEIGHT: 161.6 LBS | OXYGEN SATURATION: 98 %

## 2023-03-31 DIAGNOSIS — D17.1 LIPOMA OF TORSO: Primary | ICD-10-CM

## 2023-03-31 NOTE — PROGRESS NOTES
Chief Complaint   Mass (In RUQ )      History of Present Illness   Source of history provided by:  patient. Joby Arango is a 65 y.o. old female presenting to the walk in clinic for evaluation of a swollen superficial lesion to the RUQ, which she first noticed a few days ago. Denies any pain or redness at the site. Denies lymphangitic streaking. Denies any associated GI symptoms. Denies any bleeding or active drainage. Denies any fever, chills, HA, recent illness, myalgias, nausea, vomiting, or lethargy. ROS    Unless otherwise stated in this report or unable to obtain because of the patient's clinical or mental status as evidenced by the medical record, this patients's positive and negative responses for Review of Systems, constitutional, psych, eyes, ENT, cardiovascular, respiratory, gastrointestinal, neurological, genitourinary, musculoskeletal, integument systems and systems related to the presenting problem are either stated in the preceding or were not pertinent or were negative for the symptoms and/or complaints related to the medical problem. Past Medical History:  has a past medical history of CAD (coronary artery disease), Diabetes mellitus ([address] Essential hypertension, benign, History of colon polyps, Hyperinsulinism, Hyperlipidemia, Hypertension, and Hypothyroidism. Past Surgical History:  has a past surgical history that includes Cardiac surgery (4/11); Cardiac catheterization (5/2011); Diagnostic Cardiac Cath Lab Procedure; Coronary angioplasty; Colonoscopy; Endoscopy, colon, diagnostic; Appendectomy; Colonoscopy (07/02/2012); Carpal tunnel release (Bilateral); Cardiac catheterization (1-); eye surgery (Right); Percutaneous Transluminal Coronary Angio (2011); Upper gastrointestinal endoscopy (2012); polypectomy (2012); Carpal tunnel release (Left, 11/2013);  Hysterectomy; and Hysterectomy, total abdominal.  Social History:  reports that she quit smoking about 48 years

## 2023-05-22 DIAGNOSIS — I10 ESSENTIAL HYPERTENSION, BENIGN: Chronic | ICD-10-CM

## 2023-05-22 RX ORDER — AMLODIPINE BESYLATE 5 MG/1
TABLET ORAL
Qty: 90 TABLET | Refills: 3 | Status: SHIPPED | OUTPATIENT
Start: 2023-05-22

## 2023-05-22 RX ORDER — OLMESARTAN MEDOXOMIL 20 MG/1
TABLET ORAL
Qty: 180 TABLET | Refills: 3 | Status: SHIPPED | OUTPATIENT
Start: 2023-05-22

## 2023-05-22 NOTE — TELEPHONE ENCOUNTER
Last Appointment:  3/7/2023  Future Appointments   Date Time Provider Lucinda Sexton   5/30/2023  9:30 AM Nathaniel Garza MD [facility]   10/18/2023  8:40 AM Bong Mcmullen MD [address] [facility]

## 2023-05-30 ENCOUNTER — OFFICE VISIT (OUTPATIENT)
Dept: FAMILY MEDICINE CLINIC | Age: 78
End: 2023-05-30
Payer: MEDICARE

## 2023-05-30 VITALS
HEIGHT: 59 IN | RESPIRATION RATE: 18 BRPM | BODY MASS INDEX: 32.3 KG/M2 | OXYGEN SATURATION: 97 % | TEMPERATURE: 97 F | HEART RATE: 68 BPM | DIASTOLIC BLOOD PRESSURE: 76 MMHG | WEIGHT: 160.2 LBS | SYSTOLIC BLOOD PRESSURE: 122 MMHG

## 2023-05-30 DIAGNOSIS — E53.8 VITAMIN B12 DEFICIENCY: ICD-10-CM

## 2023-05-30 DIAGNOSIS — I10 ESSENTIAL HYPERTENSION, BENIGN: Chronic | ICD-10-CM

## 2023-05-30 DIAGNOSIS — E03.8 HYPOTHYROIDISM DUE TO HASHIMOTO'S THYROIDITIS: ICD-10-CM

## 2023-05-30 DIAGNOSIS — E06.3 HYPOTHYROIDISM DUE TO HASHIMOTO'S THYROIDITIS: ICD-10-CM

## 2023-05-30 DIAGNOSIS — I25.119 CORONARY ARTERY DISEASE INVOLVING NATIVE CORONARY ARTERY WITH ANGINA PECTORIS, UNSPECIFIED WHETHER NATIVE OR TRANSPLANTED HEART (HCC): Primary | ICD-10-CM

## 2023-05-30 DIAGNOSIS — E11.9 TYPE 2 DIABETES MELLITUS WITHOUT COMPLICATION, WITHOUT LONG-TERM CURRENT USE OF INSULIN (HCC): ICD-10-CM

## 2023-05-30 LAB
BASOPHILS # BLD: 0.06 E9/L (ref 0–0.2)
BASOPHILS NFR BLD: 1.1 % (ref 0–2)
EOSINOPHIL # BLD: 0.28 E9/L (ref 0.05–0.5)
EOSINOPHIL NFR BLD: 5.2 % (ref 0–6)
ERYTHROCYTE [DISTWIDTH] IN BLOOD BY AUTOMATED COUNT: 13.9 FL (ref 11.5–15)
HBA1C MFR BLD: 6.5 % (ref 4–5.6)
HCT VFR BLD AUTO: 41.3 % (ref 34–48)
HGB BLD-MCNC: 12.7 G/DL (ref 11.5–15.5)
IMM GRANULOCYTES # BLD: 0 E9/L
IMM GRANULOCYTES NFR BLD: 0 % (ref 0–5)
LYMPHOCYTES # BLD: 1.69 E9/L (ref 1.5–4)
LYMPHOCYTES NFR BLD: 31.6 % (ref 20–42)
MCH RBC QN AUTO: 31.7 PG (ref 26–35)
MCHC RBC AUTO-ENTMCNC: 30.8 % (ref 32–34.5)
MCV RBC AUTO: 103 FL (ref 80–99.9)
MONOCYTES # BLD: 0.38 E9/L (ref 0.1–0.95)
MONOCYTES NFR BLD: 7.1 % (ref 2–12)
NEUTROPHILS # BLD: 2.93 E9/L (ref 1.8–7.3)
NEUTS SEG NFR BLD: 55 % (ref 43–80)
PLATELET # BLD AUTO: 167 E9/L (ref 130–450)
PMV BLD AUTO: 13.3 FL (ref 7–12)
RBC # BLD AUTO: 4.01 E12/L (ref 3.5–5.5)
VIT B12 SERPL-MCNC: 395 PG/ML (ref 211–946)
WBC # BLD: 5.3 E9/L (ref 4.5–11.5)

## 2023-05-30 PROCEDURE — 1036F TOBACCO NON-USER: CPT | Performed by: FAMILY MEDICINE

## 2023-05-30 PROCEDURE — 3078F DIAST BP <80 MM HG: CPT | Performed by: FAMILY MEDICINE

## 2023-05-30 PROCEDURE — 1123F ACP DISCUSS/DSCN MKR DOCD: CPT | Performed by: FAMILY MEDICINE

## 2023-05-30 PROCEDURE — 99214 OFFICE O/P EST MOD 30 MIN: CPT | Performed by: FAMILY MEDICINE

## 2023-05-30 PROCEDURE — 3074F SYST BP LT 130 MM HG: CPT | Performed by: FAMILY MEDICINE

## 2023-05-30 PROCEDURE — 1090F PRES/ABSN URINE INCON ASSESS: CPT | Performed by: FAMILY MEDICINE

## 2023-05-30 PROCEDURE — G8427 DOCREV CUR MEDS BY ELIG CLIN: HCPCS | Performed by: FAMILY MEDICINE

## 2023-05-30 PROCEDURE — G8417 CALC BMI ABV UP PARAM F/U: HCPCS | Performed by: FAMILY MEDICINE

## 2023-05-30 PROCEDURE — G8399 PT W/DXA RESULTS DOCUMENT: HCPCS | Performed by: FAMILY MEDICINE

## 2023-05-30 PROCEDURE — 3044F HG A1C LEVEL LT 7.0%: CPT | Performed by: FAMILY MEDICINE

## 2023-05-30 RX ORDER — LEVOTHYROXINE SODIUM 75 MCG
75 TABLET ORAL EVERY MORNING
Qty: 90 TABLET | Refills: 1 | Status: SHIPPED | OUTPATIENT
Start: 2023-05-30

## 2023-05-30 RX ORDER — METOPROLOL SUCCINATE 25 MG/1
12.5 TABLET, EXTENDED RELEASE ORAL 2 TIMES DAILY
Qty: 90 TABLET | Refills: 3 | Status: SHIPPED | OUTPATIENT
Start: 2023-05-30

## 2023-05-30 ASSESSMENT — ENCOUNTER SYMPTOMS
WHEEZING: 0
CHEST TIGHTNESS: 0
PHOTOPHOBIA: 0
BLOOD IN STOOL: 0
EYE REDNESS: 0
ABDOMINAL DISTENTION: 0
SHORTNESS OF BREATH: 1
SINUS PRESSURE: 1
ABDOMINAL PAIN: 0
BACK PAIN: 1

## 2023-05-30 NOTE — PROGRESS NOTES
OFFICE NOTE    23  Name: Arnold Walters  :1945   Sex:female   Age:77 y.o. SUBJECTIVE  Chief Complaint   Patient presents with    Fatigue     X 1 month     Labs Only       HPI Says she worries about train derailment but they are back in house and staying put. Review of Systems   Constitutional:  Positive for activity change and fatigue. HENT:  Positive for congestion, hearing loss, postnasal drip and sinus pressure. Eyes:  Negative for photophobia, redness and visual disturbance. Respiratory:  Positive for shortness of breath. Negative for chest tightness and wheezing. Cardiovascular:  Negative for chest pain, palpitations and leg swelling. Gastrointestinal:  Negative for abdominal distention, abdominal pain and blood in stool. Genitourinary:  Positive for frequency and urgency. Negative for dysuria and hematuria. Musculoskeletal:  Positive for arthralgias and back pain. Skin:  Negative for pallor and rash. All other systems reviewed and are negative.          Current Outpatient Medications:     SYNTHROID 75 MCG tablet, Take 1 tablet by mouth every morning, Disp: 90 tablet, Rfl: 1    metoprolol succinate (TOPROL XL) 25 MG extended release tablet, Take 0.5 tablets by mouth 2 times daily 12.5mg BID, Disp: 90 tablet, Rfl: 3    olmesartan (BENICAR) 20 MG tablet, TAKE 2 TABLETS EVERY MORNING, Disp: 180 tablet, Rfl: 3    amLODIPine (NORVASC) 5 MG tablet, TAKE 1 TABLET DAILY AT NIGHT, Disp: 90 tablet, Rfl: 3    nitroGLYCERIN (NITROSTAT) 0.4 MG SL tablet, Place 1 tablet under the tongue every 5 minutes as needed for Chest pain, Disp: 25 tablet, Rfl: 3    omeprazole (PRILOSEC) 40 MG delayed release capsule, Take 1 capsule by mouth every morning, Disp: 90 capsule, Rfl: 3    rosuvastatin (CRESTOR) 10 MG tablet, Take 1 tablet by mouth nightly, Disp: 90 tablet, Rfl: 3    vitamin D (CHOLECALCIFEROL) 125 MCG (5000 UT) CAPS capsule, Take 1 capsule by mouth daily, Disp: , Rfl:     metFORMIN

## 2023-07-19 DIAGNOSIS — I10 ESSENTIAL HYPERTENSION, BENIGN: Chronic | ICD-10-CM

## 2023-07-19 RX ORDER — METOPROLOL SUCCINATE 25 MG/1
TABLET, EXTENDED RELEASE ORAL
Qty: 90 TABLET | Refills: 3 | OUTPATIENT
Start: 2023-07-19

## 2023-07-19 NOTE — TELEPHONE ENCOUNTER
Last Appointment:  5/30/2023  Future Appointments   Date Time Provider [address]   10/18/2023  8:40 AM Aleena Mora MD [address] [facility]   11/30/2023  8:30 AM Janna Duncan  [facility]

## 2023-07-24 ENCOUNTER — TELEPHONE (OUTPATIENT)
Dept: FAMILY MEDICINE CLINIC | Age: 78
End: 2023-07-24

## 2023-07-24 LAB
T4 FREE SERPL-MCNC: 1.3 NG/DL (ref 0.9–1.7)
TSH SERPL DL<=0.05 MIU/L-ACNC: 2.32 UIU/ML (ref 0.27–4.2)

## 2023-07-24 NOTE — TELEPHONE ENCOUNTER
Make sure she hydrates well. Little extra salt in hot weather unlikely to hurt her.  Can hold either one of her BP meds if systolic below 435 for that day

## 2023-07-24 NOTE — TELEPHONE ENCOUNTER
Camila calling in with concerns about her blood pressure. It is usually elevated, but today it was 103/51 and 100/48. This was around noon and she did get 2 vials of blood drawn today at around 9 am, but I told her this would most likely not be the cause. She is not symptomatic. She would like to know if you have any recommendations or concerns.

## 2023-07-25 ENCOUNTER — TELEPHONE (OUTPATIENT)
Dept: FAMILY MEDICINE CLINIC | Age: 78
End: 2023-07-25

## 2023-07-25 LAB — HBA1C MFR BLD: 6.6 % (ref 4–5.6)

## 2023-07-25 NOTE — TELEPHONE ENCOUNTER
Our lab has no way to test for vinyl chloride. It is a somewhat transient substance like alcohol that can cause damage but dissipates quickly from   The body. We could check her for bone marrow(CBC) or liver(transaminase levels) damage if she wants. Less specific but more inclusive for chemical damage in general. Her symptoms sound more upper respiratory. Are they measuring anything in the air where they are moving the dirt?

## 2023-07-25 NOTE — TELEPHONE ENCOUNTER
Wife calling in asking if you will order a vinyl chloride lab test at [facility]? She states they live in  and have had continued issues since the derailment and want another test. S/S; sinus issues and eye infection.

## 2023-07-26 NOTE — TELEPHONE ENCOUNTER
The order written is as is and cannot be changed. I would get it checked whenever she is supposed to have it  done. The labs appropriate to her concerns about the derailment can be ordered separately and with diagnoses attached appropriate to what we are looking for.  Might make sense to order these when we see her in November

## 2023-07-26 NOTE — TELEPHONE ENCOUNTER
Patient stated was wondering if she can wait closer to her appointment in November to get lab work done and when she does she is requesting a more detailed lab order to be done. Such as additional level labs, CBC labs and so on and so forth. Please advise.

## 2023-08-23 LAB — DIABETIC RETINOPATHY: NEGATIVE

## 2023-10-12 ENCOUNTER — TELEPHONE (OUTPATIENT)
Dept: FAMILY MEDICINE CLINIC | Age: 78
End: 2023-10-12

## 2023-10-12 DIAGNOSIS — I10 PRIMARY HYPERTENSION: Primary | ICD-10-CM

## 2023-10-12 DIAGNOSIS — E11.9 CONTROLLED TYPE 2 DIABETES MELLITUS WITHOUT COMPLICATION, WITHOUT LONG-TERM CURRENT USE OF INSULIN (HCC): ICD-10-CM

## 2023-10-12 DIAGNOSIS — E78.49 OTHER HYPERLIPIDEMIA: ICD-10-CM

## 2023-10-12 NOTE — TELEPHONE ENCOUNTER
Camila called and was asking if your could please put labwork in for herself and Rogelio Higgins now so they can have it done and be able to go over the results with you when they come in on 11/30. She said they would like the panel that would show chemical exposure due to the train derailment also.

## 2023-10-14 NOTE — TELEPHONE ENCOUNTER
Done. I am not a huge fan of doing it  this way as tona we will need to send them to lab to investigate something found during exam we did not test for, though

## 2023-11-08 ENCOUNTER — OFFICE VISIT (OUTPATIENT)
Dept: CARDIOLOGY CLINIC | Age: 78
End: 2023-11-08
Payer: MEDICARE

## 2023-11-08 VITALS
DIASTOLIC BLOOD PRESSURE: 86 MMHG | RESPIRATION RATE: 16 BRPM | HEIGHT: 59 IN | BODY MASS INDEX: 32.72 KG/M2 | WEIGHT: 162.3 LBS | SYSTOLIC BLOOD PRESSURE: 144 MMHG | HEART RATE: 73 BPM

## 2023-11-08 DIAGNOSIS — I10 ESSENTIAL HYPERTENSION, BENIGN: ICD-10-CM

## 2023-11-08 DIAGNOSIS — Z95.5 STENTED CORONARY ARTERY: ICD-10-CM

## 2023-11-08 DIAGNOSIS — I25.10 CORONARY ARTERY DISEASE INVOLVING NATIVE CORONARY ARTERY OF NATIVE HEART WITHOUT ANGINA PECTORIS: Primary | ICD-10-CM

## 2023-11-08 DIAGNOSIS — E78.00 PURE HYPERCHOLESTEROLEMIA: ICD-10-CM

## 2023-11-08 PROCEDURE — 3077F SYST BP >= 140 MM HG: CPT | Performed by: INTERNAL MEDICINE

## 2023-11-08 PROCEDURE — G8427 DOCREV CUR MEDS BY ELIG CLIN: HCPCS | Performed by: INTERNAL MEDICINE

## 2023-11-08 PROCEDURE — 3079F DIAST BP 80-89 MM HG: CPT | Performed by: INTERNAL MEDICINE

## 2023-11-08 PROCEDURE — 1036F TOBACCO NON-USER: CPT | Performed by: INTERNAL MEDICINE

## 2023-11-08 PROCEDURE — 1090F PRES/ABSN URINE INCON ASSESS: CPT | Performed by: INTERNAL MEDICINE

## 2023-11-08 PROCEDURE — 1123F ACP DISCUSS/DSCN MKR DOCD: CPT | Performed by: INTERNAL MEDICINE

## 2023-11-08 PROCEDURE — G8399 PT W/DXA RESULTS DOCUMENT: HCPCS | Performed by: INTERNAL MEDICINE

## 2023-11-08 PROCEDURE — 93000 ELECTROCARDIOGRAM COMPLETE: CPT | Performed by: INTERNAL MEDICINE

## 2023-11-08 PROCEDURE — G8417 CALC BMI ABV UP PARAM F/U: HCPCS | Performed by: INTERNAL MEDICINE

## 2023-11-08 PROCEDURE — G8484 FLU IMMUNIZE NO ADMIN: HCPCS | Performed by: INTERNAL MEDICINE

## 2023-11-08 PROCEDURE — 99214 OFFICE O/P EST MOD 30 MIN: CPT | Performed by: INTERNAL MEDICINE

## 2023-11-08 NOTE — PROGRESS NOTES
Patient Active Problem List   Diagnosis    Stented coronary artery - Cx    Essential hypertension, benign    Chest pain    Hypothyroidism    Hyperlipidemia    Diabetes mellitus ([address])    Coronary artery disease involving native coronary artery with angina pectoris, unspecified whether native or transplanted heart ([address])    H/O heart artery stent    Anxiety       Current Outpatient Medications   Medication Sig Dispense Refill    Multiple Vitamins-Minerals (MULTIVITAMIN WOMEN 50+ PO) Take 1 tablet by mouth daily      SYNTHROID 75 MCG tablet Take 1 tablet by mouth every morning 90 tablet 1    metoprolol succinate (TOPROL XL) 25 MG extended release tablet Take 0.5 tablets by mouth 2 times daily 12.5mg BID 90 tablet 3    olmesartan (BENICAR) 20 MG tablet TAKE 2 TABLETS EVERY MORNING 180 tablet 3    amLODIPine (NORVASC) 5 MG tablet TAKE 1 TABLET DAILY AT NIGHT 90 tablet 3    nitroGLYCERIN (NITROSTAT) 0.4 MG SL tablet Place 1 tablet under the tongue every 5 minutes as needed for Chest pain 25 tablet 3    omeprazole (PRILOSEC) 40 MG delayed release capsule Take 1 capsule by mouth every morning 90 capsule 3    rosuvastatin (CRESTOR) 10 MG tablet Take 1 tablet by mouth nightly 90 tablet 3    vitamin D (CHOLECALCIFEROL) 125 MCG (5000 UT) CAPS capsule Take 1 capsule by mouth daily      metFORMIN (GLUCOPHAGE) 500 MG tablet 1 tablet daily (with breakfast)      vitamin E 400 UNIT capsule Take 1 capsule by mouth every morning      aspirin 81 MG chewable tablet Take 1 tablet by mouth every morning      fluticasone (FLONASE) 50 MCG/ACT nasal spray 2 sprays by Each Nostril route daily (Patient not taking: Reported on 3/7/2023) 16 g 0    vitamin C (ASCORBIC ACID) 500 MG tablet Take 1,000 mg by mouth daily  (Patient not taking: Reported on 3/31/2023)       No current facility-administered medications for this visit. CC:    Patient is seen for new problem of ankle edema. Also in follow up for:  1.  Coronary artery disease involving

## 2023-11-21 DIAGNOSIS — E78.49 OTHER HYPERLIPIDEMIA: ICD-10-CM

## 2023-11-21 DIAGNOSIS — I10 PRIMARY HYPERTENSION: ICD-10-CM

## 2023-11-21 DIAGNOSIS — E11.9 CONTROLLED TYPE 2 DIABETES MELLITUS WITHOUT COMPLICATION, WITHOUT LONG-TERM CURRENT USE OF INSULIN (HCC): ICD-10-CM

## 2023-11-21 LAB
ABSOLUTE IMMATURE GRANULOCYTE: <0.03 K/UL (ref 0–0.58)
ALBUMIN SERPL-MCNC: 4 G/DL (ref 3.5–5.2)
ALP BLD-CCNC: 75 U/L (ref 35–104)
ALT SERPL-CCNC: 28 U/L (ref 0–32)
ANION GAP SERPL CALCULATED.3IONS-SCNC: 14 MMOL/L (ref 7–16)
AST SERPL-CCNC: 33 U/L (ref 0–31)
BASOPHILS ABSOLUTE: 0.05 K/UL (ref 0–0.2)
BASOPHILS RELATIVE PERCENT: 1 % (ref 0–2)
BILIRUB SERPL-MCNC: 0.7 MG/DL (ref 0–1.2)
BUN BLDV-MCNC: 14 MG/DL (ref 6–23)
CALCIUM SERPL-MCNC: 9.7 MG/DL (ref 8.6–10.2)
CHLORIDE BLD-SCNC: 103 MMOL/L (ref 98–107)
CHOLESTEROL: 150 MG/DL
CO2: 21 MMOL/L (ref 22–29)
CREAT SERPL-MCNC: 0.8 MG/DL (ref 0.5–1)
CREATININE URINE: 61.9 MG/DL (ref 29–226)
EOSINOPHILS ABSOLUTE: 0.22 K/UL (ref 0.05–0.5)
EOSINOPHILS RELATIVE PERCENT: 4 % (ref 0–6)
GFR SERPL CREATININE-BSD FRML MDRD: >60 ML/MIN/1.73M2
GLUCOSE BLD-MCNC: 130 MG/DL (ref 74–99)
HBA1C MFR BLD: 6.7 % (ref 4–5.6)
HCT VFR BLD CALC: 40.8 % (ref 34–48)
HDLC SERPL-MCNC: 39 MG/DL
HEMOGLOBIN: 13 G/DL (ref 11.5–15.5)
IMMATURE GRANULOCYTES: 0 % (ref 0–5)
LDL CHOLESTEROL: 83 MG/DL
LYMPHOCYTES ABSOLUTE: 1.62 K/UL (ref 1.5–4)
LYMPHOCYTES RELATIVE PERCENT: 29 % (ref 20–42)
MCH RBC QN AUTO: 31.3 PG (ref 26–35)
MCHC RBC AUTO-ENTMCNC: 31.9 G/DL (ref 32–34.5)
MCV RBC AUTO: 98.3 FL (ref 80–99.9)
MICROALBUMIN/CREAT 24H UR: <12 MG/L (ref 0–19)
MICROALBUMIN/CREAT UR-RTO: NORMAL MCG/MG CREAT (ref 0–30)
MONOCYTES ABSOLUTE: 0.34 K/UL (ref 0.1–0.95)
MONOCYTES RELATIVE PERCENT: 6 % (ref 2–12)
NEUTROPHILS ABSOLUTE: 3.29 K/UL (ref 1.8–7.3)
NEUTROPHILS RELATIVE PERCENT: 60 % (ref 43–80)
PDW BLD-RTO: 13.3 % (ref 11.5–15)
PLATELET, FLUORESCENCE: 168 K/UL (ref 130–450)
PMV BLD AUTO: 13.4 FL (ref 7–12)
POTASSIUM SERPL-SCNC: 4.4 MMOL/L (ref 3.5–5)
RBC # BLD: 4.15 M/UL (ref 3.5–5.5)
SODIUM BLD-SCNC: 138 MMOL/L (ref 132–146)
TOTAL PROTEIN: 7.3 G/DL (ref 6.4–8.3)
TRIGL SERPL-MCNC: 142 MG/DL
VLDLC SERPL CALC-MCNC: 28 MG/DL
WBC # BLD: 5.5 K/UL (ref 4.5–11.5)

## 2023-11-30 ENCOUNTER — OFFICE VISIT (OUTPATIENT)
Dept: FAMILY MEDICINE CLINIC | Age: 78
End: 2023-11-30
Payer: MEDICARE

## 2023-11-30 VITALS
BODY MASS INDEX: 32.66 KG/M2 | OXYGEN SATURATION: 97 % | WEIGHT: 162 LBS | HEIGHT: 59 IN | HEART RATE: 84 BPM | TEMPERATURE: 98.7 F | DIASTOLIC BLOOD PRESSURE: 80 MMHG | SYSTOLIC BLOOD PRESSURE: 120 MMHG | RESPIRATION RATE: 18 BRPM

## 2023-11-30 DIAGNOSIS — K21.9 GASTROESOPHAGEAL REFLUX DISEASE WITHOUT ESOPHAGITIS: ICD-10-CM

## 2023-11-30 DIAGNOSIS — E06.3 HYPOTHYROIDISM DUE TO HASHIMOTO'S THYROIDITIS: ICD-10-CM

## 2023-11-30 DIAGNOSIS — E78.49 OTHER HYPERLIPIDEMIA: ICD-10-CM

## 2023-11-30 DIAGNOSIS — E03.8 HYPOTHYROIDISM DUE TO HASHIMOTO'S THYROIDITIS: ICD-10-CM

## 2023-11-30 DIAGNOSIS — Z00.00 INITIAL MEDICARE ANNUAL WELLNESS VISIT: Primary | ICD-10-CM

## 2023-11-30 DIAGNOSIS — E11.9 TYPE 2 DIABETES MELLITUS WITHOUT COMPLICATION, WITHOUT LONG-TERM CURRENT USE OF INSULIN (HCC): ICD-10-CM

## 2023-11-30 DIAGNOSIS — I25.119 CORONARY ARTERY DISEASE INVOLVING NATIVE CORONARY ARTERY WITH ANGINA PECTORIS, UNSPECIFIED WHETHER NATIVE OR TRANSPLANTED HEART (HCC): ICD-10-CM

## 2023-11-30 PROCEDURE — 3074F SYST BP LT 130 MM HG: CPT | Performed by: FAMILY MEDICINE

## 2023-11-30 PROCEDURE — G8484 FLU IMMUNIZE NO ADMIN: HCPCS | Performed by: FAMILY MEDICINE

## 2023-11-30 PROCEDURE — 3044F HG A1C LEVEL LT 7.0%: CPT | Performed by: FAMILY MEDICINE

## 2023-11-30 PROCEDURE — 1123F ACP DISCUSS/DSCN MKR DOCD: CPT | Performed by: FAMILY MEDICINE

## 2023-11-30 PROCEDURE — G0438 PPPS, INITIAL VISIT: HCPCS | Performed by: FAMILY MEDICINE

## 2023-11-30 PROCEDURE — 3079F DIAST BP 80-89 MM HG: CPT | Performed by: FAMILY MEDICINE

## 2023-11-30 RX ORDER — ROSUVASTATIN CALCIUM 10 MG/1
10 TABLET, COATED ORAL NIGHTLY
Qty: 90 TABLET | Refills: 3 | Status: SHIPPED | OUTPATIENT
Start: 2023-11-30

## 2023-11-30 RX ORDER — OMEPRAZOLE 40 MG/1
40 CAPSULE, DELAYED RELEASE ORAL EVERY MORNING
Qty: 90 CAPSULE | Refills: 3 | Status: SHIPPED | OUTPATIENT
Start: 2023-11-30

## 2023-11-30 RX ORDER — LEVOTHYROXINE SODIUM 75 MCG
75 TABLET ORAL EVERY MORNING
Qty: 90 TABLET | Refills: 1 | Status: SHIPPED | OUTPATIENT
Start: 2023-11-30

## 2023-11-30 ASSESSMENT — ENCOUNTER SYMPTOMS
EYE REDNESS: 0
SHORTNESS OF BREATH: 0
CHEST TIGHTNESS: 0
PHOTOPHOBIA: 0
WHEEZING: 0
DIARRHEA: 0
ABDOMINAL PAIN: 0
BLOOD IN STOOL: 0
VOMITING: 0
EYES NEGATIVE: 1
COUGH: 0
CONSTIPATION: 0

## 2023-11-30 ASSESSMENT — LIFESTYLE VARIABLES
HOW MANY STANDARD DRINKS CONTAINING ALCOHOL DO YOU HAVE ON A TYPICAL DAY: 1 OR 2
HOW OFTEN DO YOU HAVE A DRINK CONTAINING ALCOHOL: MONTHLY OR LESS

## 2023-11-30 ASSESSMENT — PATIENT HEALTH QUESTIONNAIRE - PHQ9
SUM OF ALL RESPONSES TO PHQ9 QUESTIONS 1 & 2: 0
SUM OF ALL RESPONSES TO PHQ QUESTIONS 1-9: 0
SUM OF ALL RESPONSES TO PHQ QUESTIONS 1-9: 0
1. LITTLE INTEREST OR PLEASURE IN DOING THINGS: 0
SUM OF ALL RESPONSES TO PHQ QUESTIONS 1-9: 0
2. FEELING DOWN, DEPRESSED OR HOPELESS: 0
SUM OF ALL RESPONSES TO PHQ QUESTIONS 1-9: 0

## 2023-11-30 NOTE — PROGRESS NOTES
spray, 2 sprays by Each Nostril route daily (Patient not taking: Reported on 3/7/2023), Disp: 16 g, Rfl: 0    vitamin C (ASCORBIC ACID) 500 MG tablet, Take 1,000 mg by mouth daily  (Patient not taking: Reported on 3/31/2023), Disp: , Rfl:   Allergies   Allergen Reactions    Other Nausea Only     unknown antibiotic       Past Medical History:   Diagnosis Date    CAD (coronary artery disease)     follows with Dr. Earl Gowers yearly (next appt3/22/18)    Diabetes mellitus ([address] [facility])     Essential hypertension, benign 3/26/2014    History of colon polyps     Hyperinsulinism     Hyperlipidemia     Hypertension     Hypothyroidism      Past Surgical History:   Procedure Laterality Date    APPENDECTOMY      CARDIAC CATHETERIZATION  5/2011    CARDIAC CATHETERIZATION  1-    Dr. Angelica Hebert  4/11    stent placement    CARPAL TUNNEL RELEASE Bilateral     CARPAL TUNNEL RELEASE Left 11/2013    COLONOSCOPY      COLONOSCOPY  07/02/2012    CORONARY ANGIOPLASTY      DIAGNOSTIC CARDIAC CATH LAB PROCEDURE      ENDOSCOPY, COLON, DIAGNOSTIC      EYE SURGERY Right     cataract    HYSTERECTOMY (CERVIX STATUS UNKNOWN)      Complete at age 27    HYSTERECTOMY, TOTAL ABDOMINAL (CERVIX REMOVED)      POLYPECTOMY  2012    PTCA  2011    WITH STENT 2 TIMES     UPPER GASTROINTESTINAL ENDOSCOPY  2012     Family History   Problem Relation Age of Onset    Heart Attack Mother     Heart Attack Father     Heart Attack Sister     Heart Surgery Brother     Breast Cancer Maternal Aunt      Social History       Tobacco History       Smoking Status  Former Smoking Start Date  11/12/1959 Quit Date  11/12/1974 Smoking Frequency  0.50 packs/day for 15.00 years (7.50 ttl pk-yrs)    Smoking Tobacco Type  Cigarettes from 11/12/1959 to 11/12/1974      Smokeless Tobacco Use  Never              Alcohol History       Alcohol Use Status  No              Drug Use       Drug Use Status  No              Sexual Activity       Sexually Active  Defer

## 2024-02-06 LAB
ALBUMIN SERPL-MCNC: 4 G/DL (ref 3.5–5.2)
ALP SERPL-CCNC: 75 U/L (ref 35–104)
ALT SERPL-CCNC: 28 U/L (ref 0–32)
ANION GAP SERPL CALCULATED.3IONS-SCNC: 11 MMOL/L (ref 7–16)
AST SERPL-CCNC: 28 U/L (ref 0–31)
BILIRUB SERPL-MCNC: 0.5 MG/DL (ref 0–1.2)
BUN SERPL-MCNC: 18 MG/DL (ref 6–23)
CALCIUM SERPL-MCNC: 9.9 MG/DL (ref 8.6–10.2)
CHLORIDE SERPL-SCNC: 103 MMOL/L (ref 98–107)
CHOLEST SERPL-MCNC: 137 MG/DL
CO2 SERPL-SCNC: 23 MMOL/L (ref 22–29)
CREAT SERPL-MCNC: 0.7 MG/DL (ref 0.5–1)
CREAT UR-MCNC: 79.1 MG/DL (ref 29–226)
GFR SERPL CREATININE-BSD FRML MDRD: >60 ML/MIN/1.73M2
GLUCOSE P FAST SERPL-MCNC: 135 MG/DL (ref 74–99)
HBA1C MFR BLD: 6.7 % (ref 4–5.6)
HDLC SERPL-MCNC: 42 MG/DL
LDLC SERPL CALC-MCNC: 74 MG/DL
MICROALBUMIN UR-MCNC: <12 MG/L (ref 0–19)
MICROALBUMIN/CREAT UR-RTO: NORMAL MCG/MG CREAT (ref 0–30)
POTASSIUM SERPL-SCNC: 4.3 MMOL/L (ref 3.5–5)
PROT SERPL-MCNC: 7.7 G/DL (ref 6.4–8.3)
SODIUM SERPL-SCNC: 137 MMOL/L (ref 132–146)
TRIGL SERPL-MCNC: 105 MG/DL
VLDLC SERPL CALC-MCNC: 21 MG/DL

## 2024-02-23 LAB
BUN SERPL-MCNC: 16 MG/DL (ref 6–23)
CREAT SERPL-MCNC: 0.8 MG/DL (ref 0.5–1)
GFR SERPL CREATININE-BSD FRML MDRD: >60 ML/MIN/1.73M2

## 2024-03-13 ENCOUNTER — TELEPHONE (OUTPATIENT)
Dept: FAMILY MEDICINE CLINIC | Age: 79
End: 2024-03-13

## 2024-03-13 DIAGNOSIS — I10 ESSENTIAL HYPERTENSION, BENIGN: Chronic | ICD-10-CM

## 2024-03-13 DIAGNOSIS — E78.00 PURE HYPERCHOLESTEROLEMIA: ICD-10-CM

## 2024-03-13 DIAGNOSIS — I49.3 PVC (PREMATURE VENTRICULAR CONTRACTION): ICD-10-CM

## 2024-03-13 DIAGNOSIS — I25.119 CORONARY ARTERY DISEASE INVOLVING NATIVE CORONARY ARTERY WITH ANGINA PECTORIS, UNSPECIFIED WHETHER NATIVE OR TRANSPLANTED HEART (HCC): Chronic | ICD-10-CM

## 2024-03-13 DIAGNOSIS — R07.89 OTHER CHEST PAIN: ICD-10-CM

## 2024-03-13 RX ORDER — NITROGLYCERIN 0.4 MG/1
0.4 TABLET SUBLINGUAL EVERY 5 MIN PRN
Qty: 25 TABLET | Refills: 3 | Status: SHIPPED | OUTPATIENT
Start: 2024-03-13

## 2024-03-13 NOTE — TELEPHONE ENCOUNTER
Patient requested that a copy of her CT report be faxed to Dr Navas for her chart. This was to be faxed yesterday. Patient wanted to make sure we received.Derrell was having an issue w/ phones yesterday, unsure if it went through.  I do not see a report in her chart, has this come across or would you like me to call Derrell to request again?     3/5 urology note states that CT urogram was unremarkable.

## 2024-03-13 NOTE — TELEPHONE ENCOUNTER
Camila would like a refill on Nitro sent to [facility]. Rx is ready for you to sign.     Last Appointment:  11/30/2023  Future Appointments   Date Time Provider Department Center   3/26/2024  2:00 PM [facility]   3/27/2024  8:40 AM Walter Ramirez MD [place] [facility]   6/10/2024 10:00 AM Yobani Navas MD [facility]   11/12/2024  8:20 AM Walter Ramirez MD [place] [facility]

## 2024-03-15 NOTE — TELEPHONE ENCOUNTER
I called and spoke with [facility] Medical records department and they stated that they will send over the results today.

## 2024-03-19 ENCOUNTER — TELEPHONE (OUTPATIENT)
Dept: FAMILY MEDICINE CLINIC | Age: 79
End: 2024-03-19

## 2024-03-19 DIAGNOSIS — Z12.31 ENCOUNTER FOR SCREENING MAMMOGRAM FOR BREAST CANCER: Primary | ICD-10-CM

## 2024-03-19 NOTE — TELEPHONE ENCOUNTER
Patient is scheduled for a mammogram on 4/9. Can you place an order so I can attach it to her appt?

## 2024-03-19 NOTE — TELEPHONE ENCOUNTER
She is taking metformin for her diabetes, and crestor for his lipids. Following a Mediterranean diet might help some. If her liver enzymes are normal, probably doesn't need to worry too much about this.

## 2024-03-19 NOTE — TELEPHONE ENCOUNTER
Patient called. She wants to know what she do about her fatty liver? She doesn't see you again until June. Is there something she can do help that fatty liver in the meantime?

## 2024-03-20 NOTE — TELEPHONE ENCOUNTER
I called Camila to relay this message, but Don said she just left the house.    He will have her call the nurse line when she gets home.

## 2024-03-20 NOTE — TELEPHONE ENCOUNTER
Camila called back and was advised that by reducing the amount of fat in the diet, she can reduce the amount that ends up in the liver.    She stated that she understands and will work on that.

## 2024-03-27 ENCOUNTER — OFFICE VISIT (OUTPATIENT)
Dept: CARDIOLOGY CLINIC | Age: 79
End: 2024-03-27
Payer: MEDICARE

## 2024-03-27 VITALS
HEIGHT: 59 IN | BODY MASS INDEX: 32.7 KG/M2 | RESPIRATION RATE: 18 BRPM | WEIGHT: 162.2 LBS | DIASTOLIC BLOOD PRESSURE: 82 MMHG | SYSTOLIC BLOOD PRESSURE: 134 MMHG | HEART RATE: 71 BPM

## 2024-03-27 DIAGNOSIS — E78.00 PURE HYPERCHOLESTEROLEMIA: ICD-10-CM

## 2024-03-27 DIAGNOSIS — R07.89 CHEST PAIN, ATYPICAL: Primary | ICD-10-CM

## 2024-03-27 DIAGNOSIS — I10 ESSENTIAL HYPERTENSION, BENIGN: ICD-10-CM

## 2024-03-27 DIAGNOSIS — I25.10 CORONARY ARTERY DISEASE INVOLVING NATIVE CORONARY ARTERY OF NATIVE HEART WITHOUT ANGINA PECTORIS: ICD-10-CM

## 2024-03-27 DIAGNOSIS — Z95.5 STENTED CORONARY ARTERY: ICD-10-CM

## 2024-03-27 PROCEDURE — 1090F PRES/ABSN URINE INCON ASSESS: CPT | Performed by: INTERNAL MEDICINE

## 2024-03-27 PROCEDURE — G8399 PT W/DXA RESULTS DOCUMENT: HCPCS | Performed by: INTERNAL MEDICINE

## 2024-03-27 PROCEDURE — G8484 FLU IMMUNIZE NO ADMIN: HCPCS | Performed by: INTERNAL MEDICINE

## 2024-03-27 PROCEDURE — 1123F ACP DISCUSS/DSCN MKR DOCD: CPT | Performed by: INTERNAL MEDICINE

## 2024-03-27 PROCEDURE — 93000 ELECTROCARDIOGRAM COMPLETE: CPT | Performed by: INTERNAL MEDICINE

## 2024-03-27 PROCEDURE — 3079F DIAST BP 80-89 MM HG: CPT | Performed by: INTERNAL MEDICINE

## 2024-03-27 PROCEDURE — 1036F TOBACCO NON-USER: CPT | Performed by: INTERNAL MEDICINE

## 2024-03-27 PROCEDURE — G8417 CALC BMI ABV UP PARAM F/U: HCPCS | Performed by: INTERNAL MEDICINE

## 2024-03-27 PROCEDURE — 99214 OFFICE O/P EST MOD 30 MIN: CPT | Performed by: INTERNAL MEDICINE

## 2024-03-27 PROCEDURE — 3075F SYST BP GE 130 - 139MM HG: CPT | Performed by: INTERNAL MEDICINE

## 2024-03-27 PROCEDURE — G8427 DOCREV CUR MEDS BY ELIG CLIN: HCPCS | Performed by: INTERNAL MEDICINE

## 2024-03-27 NOTE — PROGRESS NOTES
Patient Active Problem List   Diagnosis    Stented coronary artery - Cx    Essential hypertension, benign    Chest pain    Hypothyroidism    Hyperlipidemia    Diabetes mellitus (HCC)    Coronary artery disease involving native coronary artery with angina pectoris, unspecified whether native or transplanted heart ([facility])    H/O heart artery stent    Anxiety       Current Outpatient Medications   Medication Sig Dispense Refill    nitroGLYCERIN (NITROSTAT) 0.4 MG SL tablet Place 1 tablet under the tongue every 5 minutes as needed for Chest pain 25 tablet 3    SYNTHROID 75 MCG tablet Take 1 tablet by mouth every morning 90 tablet 1    omeprazole (PRILOSEC) 40 MG delayed release capsule Take 1 capsule by mouth every morning 90 capsule 3    rosuvastatin (CRESTOR) 10 MG tablet Take 1 tablet by mouth nightly 90 tablet 3    Multiple Vitamins-Minerals (MULTIVITAMIN WOMEN 50+ PO) Take 1 tablet by mouth daily      metoprolol succinate (TOPROL XL) 25 MG extended release tablet Take 0.5 tablets by mouth 2 times daily 12.5mg BID (Patient taking differently: Take 1 tablet by mouth nightly) 90 tablet 3    olmesartan (BENICAR) 20 MG tablet TAKE 2 TABLETS EVERY MORNING 180 tablet 3    amLODIPine (NORVASC) 5 MG tablet TAKE 1 TABLET DAILY AT NIGHT 90 tablet 3    vitamin D (CHOLECALCIFEROL) 125 MCG (5000 UT) CAPS capsule Take 1 capsule by mouth daily      metFORMIN (GLUCOPHAGE) 500 MG tablet 1 tablet daily (with breakfast)      vitamin E 400 UNIT capsule Take 1 capsule by mouth every morning      vitamin C (ASCORBIC ACID) 500 MG tablet Take 2 tablets by mouth daily      aspirin 81 MG chewable tablet Take 1 tablet by mouth every morning       No current facility-administered medications for this visit.       CC:    Patient is seen in follow up for:  1. Chest pain, atypical    2. Coronary artery disease involving native coronary artery of native heart without angina pectoris    3. Pure hypercholesterolemia    4. Essential hypertension,

## 2024-03-29 ENCOUNTER — TELEPHONE (OUTPATIENT)
Dept: CARDIOLOGY | Age: 79
End: 2024-03-29

## 2024-03-29 NOTE — TELEPHONE ENCOUNTER
Spoke with patient and confirmed nuclear exercise stress test appointment on April 2, 2024 at 0800. Instructions for test,including holding metformin morning of test and holding metoprolol 24 hours prior to test, and COVID-19 preprocedure information reviewed with patient, questions answered. Patient verbalized understanding. Also instructed to call office if unable to keep appointment.

## 2024-04-02 ENCOUNTER — HOSPITAL ENCOUNTER (OUTPATIENT)
Dept: CARDIOLOGY | Age: 79
Discharge: HOME | End: 2024-04-04
Attending: INTERNAL MEDICINE
Payer: MEDICARE

## 2024-04-02 VITALS
WEIGHT: 162 LBS | RESPIRATION RATE: 16 BRPM | HEIGHT: 59 IN | SYSTOLIC BLOOD PRESSURE: 148 MMHG | BODY MASS INDEX: 32.66 KG/M2 | DIASTOLIC BLOOD PRESSURE: 82 MMHG | HEART RATE: 83 BPM

## 2024-04-02 DIAGNOSIS — R07.89 CHEST PAIN, ATYPICAL: ICD-10-CM

## 2024-04-02 LAB
ECHO BSA: 1.75 M2
NUC STRESS EJECTION FRACTION: 72 %
STRESS BASELINE DIAS BP: 82 MMHG
STRESS BASELINE HR: 87 BPM
STRESS BASELINE SYS BP: 148 MMHG
STRESS ESTIMATED WORKLOAD: 4.5 METS
STRESS EXERCISE DUR MIN: 3 MIN
STRESS O2 SAT PEAK: 93 %
STRESS O2 SAT REST: 96 %
STRESS PEAK DIAS BP: 80 MMHG
STRESS PEAK SYS BP: 162 MMHG
STRESS PERCENT HR ACHIEVED: 106 %
STRESS POST PEAK HR: 150 BPM
STRESS RATE PRESSURE PRODUCT: NORMAL BPM*MMHG
STRESS TARGET HR: 141 BPM

## 2024-04-02 PROCEDURE — 93016 CV STRESS TEST SUPVJ ONLY: CPT | Performed by: INTERNAL MEDICINE

## 2024-04-02 PROCEDURE — 2580000003 HC RX 258: Performed by: INTERNAL MEDICINE

## 2024-04-02 PROCEDURE — A9500 TC99M SESTAMIBI: HCPCS | Performed by: INTERNAL MEDICINE

## 2024-04-02 PROCEDURE — 93017 CV STRESS TEST TRACING ONLY: CPT

## 2024-04-02 PROCEDURE — 6370000000 HC RX 637 (ALT 250 FOR IP): Performed by: INTERNAL MEDICINE

## 2024-04-02 PROCEDURE — 3430000000 HC RX DIAGNOSTIC RADIOPHARMACEUTICAL: Performed by: INTERNAL MEDICINE

## 2024-04-02 PROCEDURE — 78452 HT MUSCLE IMAGE SPECT MULT: CPT | Performed by: INTERNAL MEDICINE

## 2024-04-02 PROCEDURE — 93018 CV STRESS TEST I&R ONLY: CPT | Performed by: INTERNAL MEDICINE

## 2024-04-02 RX ORDER — TETRAKIS(2-METHOXYISOBUTYLISOCYANIDE)COPPER(I) TETRAFLUOROBORATE 1 MG/ML
32.3 INJECTION, POWDER, LYOPHILIZED, FOR SOLUTION INTRAVENOUS
Status: COMPLETED | OUTPATIENT
Start: 2024-04-02 | End: 2024-04-02

## 2024-04-02 RX ORDER — TETRAKIS(2-METHOXYISOBUTYLISOCYANIDE)COPPER(I) TETRAFLUOROBORATE 1 MG/ML
9.7 INJECTION, POWDER, LYOPHILIZED, FOR SOLUTION INTRAVENOUS
Status: COMPLETED | OUTPATIENT
Start: 2024-04-02 | End: 2024-04-02

## 2024-04-02 RX ORDER — SODIUM CHLORIDE 0.9 % (FLUSH) 0.9 %
10 SYRINGE (ML) INJECTION PRN
Status: DISCONTINUED | OUTPATIENT
Start: 2024-04-02 | End: 2024-04-05 | Stop reason: HOSPADM

## 2024-04-02 RX ADMIN — Medication 9.7 MILLICURIE: at 08:06

## 2024-04-02 RX ADMIN — METOPROLOL TARTRATE 25 MG: 25 TABLET, FILM COATED ORAL at 10:00

## 2024-04-02 RX ADMIN — SODIUM CHLORIDE, PRESERVATIVE FREE 10 ML: 5 INJECTION INTRAVENOUS at 08:06

## 2024-04-02 RX ADMIN — Medication 32.3 MILLICURIE: at 09:36

## 2024-04-02 RX ADMIN — SODIUM CHLORIDE, PRESERVATIVE FREE 10 ML: 5 INJECTION INTRAVENOUS at 09:36

## 2024-04-03 ENCOUNTER — TELEPHONE (OUTPATIENT)
Dept: CARDIOLOGY CLINIC | Age: 79
End: 2024-04-03

## 2024-04-03 NOTE — TELEPHONE ENCOUNTER
----- Message from Walter Ramirez MD sent at 4/3/2024  8:00 AM EDT -----  Please let patient know that stress test was normal.  Continue same medications.  Office visit follow-up if chest discomfort persist.

## 2024-04-28 NOTE — TELEPHONE ENCOUNTER
Your ultrasound today did not demonstrate an intrauterine pregnancy nor an ectopic pregnancy.  Judging by your blood hCG (pregnancy hormone) level, this is likely because your pregnancy is still in the very early stages.  However, we still have not ruled out ectopic (tubal) pregnancy, which can be life-threatening.  It is important that you have a repeat hormone level drawn in about 2 days.  We have provided you a prescription and you can go to any [facility]'s lab to have this performed.  Please call ahead of time to confirm time and availability.  On the advice of our on-call OB team you can continue to take Wellbutrin and Lexapro.  Call the clinic to establish prenatal care.  When you talk to your OB, you can discuss whether or not to continue taking these medicines or to switch to other alternatives.  If you have new or worsening symptoms please call your OB provider or return to the emergency department.   sent

## 2024-05-31 DIAGNOSIS — I10 ESSENTIAL HYPERTENSION, BENIGN: Chronic | ICD-10-CM

## 2024-05-31 RX ORDER — AMLODIPINE BESYLATE 5 MG/1
TABLET ORAL
Qty: 90 TABLET | Refills: 1 | Status: SHIPPED | OUTPATIENT
Start: 2024-05-31

## 2024-05-31 RX ORDER — OLMESARTAN MEDOXOMIL 20 MG/1
TABLET ORAL
Qty: 180 TABLET | Refills: 1 | Status: SHIPPED | OUTPATIENT
Start: 2024-05-31

## 2024-05-31 NOTE — TELEPHONE ENCOUNTER
Last Appointment:  11/30/2023  Future Appointments   Date Time Provider Department Center   6/10/2024 10:00 AM Yobani Navas MD [facility]   11/12/2024  8:20 AM Walter Ramirez MD [facility]

## 2024-06-10 ENCOUNTER — OFFICE VISIT (OUTPATIENT)
Dept: FAMILY MEDICINE CLINIC | Age: 79
End: 2024-06-10
Payer: MEDICARE

## 2024-06-10 VITALS
DIASTOLIC BLOOD PRESSURE: 80 MMHG | TEMPERATURE: 98.1 F | RESPIRATION RATE: 18 BRPM | SYSTOLIC BLOOD PRESSURE: 122 MMHG | OXYGEN SATURATION: 97 % | WEIGHT: 159 LBS | BODY MASS INDEX: 32.05 KG/M2 | HEIGHT: 59 IN | HEART RATE: 72 BPM

## 2024-06-10 DIAGNOSIS — E11.9 TYPE 2 DIABETES MELLITUS WITHOUT COMPLICATION, WITHOUT LONG-TERM CURRENT USE OF INSULIN (HCC): ICD-10-CM

## 2024-06-10 DIAGNOSIS — E06.3 HYPOTHYROIDISM DUE TO HASHIMOTO'S THYROIDITIS: ICD-10-CM

## 2024-06-10 DIAGNOSIS — R05.3 CHRONIC COUGH: ICD-10-CM

## 2024-06-10 DIAGNOSIS — Z95.5 STENTED CORONARY ARTERY: ICD-10-CM

## 2024-06-10 DIAGNOSIS — E03.8 HYPOTHYROIDISM DUE TO HASHIMOTO'S THYROIDITIS: ICD-10-CM

## 2024-06-10 DIAGNOSIS — I10 ESSENTIAL HYPERTENSION, BENIGN: Primary | Chronic | ICD-10-CM

## 2024-06-10 DIAGNOSIS — R31.1 BENIGN ESSENTIAL MICROSCOPIC HEMATURIA: ICD-10-CM

## 2024-06-10 PROCEDURE — G8427 DOCREV CUR MEDS BY ELIG CLIN: HCPCS | Performed by: FAMILY MEDICINE

## 2024-06-10 PROCEDURE — 3074F SYST BP LT 130 MM HG: CPT | Performed by: FAMILY MEDICINE

## 2024-06-10 PROCEDURE — G8417 CALC BMI ABV UP PARAM F/U: HCPCS | Performed by: FAMILY MEDICINE

## 2024-06-10 PROCEDURE — 1123F ACP DISCUSS/DSCN MKR DOCD: CPT | Performed by: FAMILY MEDICINE

## 2024-06-10 PROCEDURE — 3079F DIAST BP 80-89 MM HG: CPT | Performed by: FAMILY MEDICINE

## 2024-06-10 PROCEDURE — 1090F PRES/ABSN URINE INCON ASSESS: CPT | Performed by: FAMILY MEDICINE

## 2024-06-10 PROCEDURE — 99214 OFFICE O/P EST MOD 30 MIN: CPT | Performed by: FAMILY MEDICINE

## 2024-06-10 PROCEDURE — 1036F TOBACCO NON-USER: CPT | Performed by: FAMILY MEDICINE

## 2024-06-10 PROCEDURE — G8399 PT W/DXA RESULTS DOCUMENT: HCPCS | Performed by: FAMILY MEDICINE

## 2024-06-10 PROCEDURE — 3044F HG A1C LEVEL LT 7.0%: CPT | Performed by: FAMILY MEDICINE

## 2024-06-10 RX ORDER — CLOBETASOL PROPIONATE 0.5 MG/G
60 CREAM TOPICAL DAILY PRN
COMMUNITY
Start: 2023-12-27

## 2024-06-10 RX ORDER — LEVOTHYROXINE SODIUM 75 MCG
75 TABLET ORAL EVERY MORNING
Qty: 90 TABLET | Refills: 1 | Status: SHIPPED | OUTPATIENT
Start: 2024-06-10

## 2024-06-10 SDOH — ECONOMIC STABILITY: FOOD INSECURITY: WITHIN THE PAST 12 MONTHS, THE FOOD YOU BOUGHT JUST DIDN'T LAST AND YOU DIDN'T HAVE MONEY TO GET MORE.: NEVER TRUE

## 2024-06-10 SDOH — ECONOMIC STABILITY: HOUSING INSECURITY
IN THE LAST 12 MONTHS, WAS THERE A TIME WHEN YOU DID NOT HAVE A STEADY PLACE TO SLEEP OR SLEPT IN A SHELTER (INCLUDING NOW)?: NO

## 2024-06-10 SDOH — ECONOMIC STABILITY: FOOD INSECURITY: WITHIN THE PAST 12 MONTHS, YOU WORRIED THAT YOUR FOOD WOULD RUN OUT BEFORE YOU GOT MONEY TO BUY MORE.: NEVER TRUE

## 2024-06-10 SDOH — ECONOMIC STABILITY: INCOME INSECURITY: HOW HARD IS IT FOR YOU TO PAY FOR THE VERY BASICS LIKE FOOD, HOUSING, MEDICAL CARE, AND HEATING?: NOT HARD AT ALL

## 2024-06-10 ASSESSMENT — ENCOUNTER SYMPTOMS
PHOTOPHOBIA: 0
EYE REDNESS: 0
COUGH: 1
CHEST TIGHTNESS: 0
DIARRHEA: 0
ABDOMINAL PAIN: 0
EYES NEGATIVE: 1
SORE THROAT: 0
WHEEZING: 0
BLOOD IN STOOL: 0
TROUBLE SWALLOWING: 0
VOMITING: 0
CONSTIPATION: 0
SHORTNESS OF BREATH: 0

## 2024-06-10 ASSESSMENT — PATIENT HEALTH QUESTIONNAIRE - PHQ9
SUM OF ALL RESPONSES TO PHQ9 QUESTIONS 1 & 2: 0
SUM OF ALL RESPONSES TO PHQ QUESTIONS 1-9: 0
1. LITTLE INTEREST OR PLEASURE IN DOING THINGS: NOT AT ALL
2. FEELING DOWN, DEPRESSED OR HOPELESS: NOT AT ALL
SUM OF ALL RESPONSES TO PHQ QUESTIONS 1-9: 0

## 2024-06-10 NOTE — PROGRESS NOTES
07/02/2012    CORONARY ANGIOPLASTY      DIAGNOSTIC CARDIAC CATH LAB PROCEDURE      ENDOSCOPY, COLON, DIAGNOSTIC      EYE SURGERY Right     cataract    HYSTERECTOMY (CERVIX STATUS UNKNOWN)      Complete at age 34    HYSTERECTOMY, TOTAL ABDOMINAL (CERVIX REMOVED)      POLYPECTOMY  2012    PTCA  2011    WITH STENT 2 TIMES     UPPER GASTROINTESTINAL ENDOSCOPY  2012     Family History   Problem Relation Age of Onset    Heart Attack Mother     Heart Attack Father     Heart Attack Sister     Heart Surgery Brother     Breast Cancer Maternal Aunt      Social History       Tobacco History       Smoking Status  Former Smoking Start Date  11/12/1959 Quit Date  11/12/1974 Average Packs/Day  0.5 packs/day for 15.0 years (7.5 ttl pk-yrs) Smoking Tobacco Type  Cigarettes from 11/12/1959 to 11/12/1974   Pack Year History     Packs/Day From To Years    0 11/12/1974  49.6    0.5 11/12/1959 11/12/1974 15.0      Smokeless Tobacco Use  Never              Alcohol History       Alcohol Use Status  No              Drug Use       Drug Use Status  No              Sexual Activity       Sexually Active  Defer                    OBJECTIVE  Vitals:    06/10/24 1025   BP: 122/80   Pulse: 72   Resp: 18   Temp: 98.1 °F (36.7 °C)   TempSrc: Temporal   SpO2: 97%   Weight: 72.1 kg (159 lb)   Height: 1.499 m (4' 11\")        Body mass index is 32.11 kg/m².    Orders Placed This Encounter   Procedures    XR CHEST (2 VW)     Standing Status:   Future     Standing Expiration Date:   6/10/2025    CBC with Auto Differential     Standing Status:   Future     Standing Expiration Date:   6/10/2025    Comprehensive Metabolic Panel     Standing Status:   Future     Standing Expiration Date:   6/10/2025    Lipid Panel     Standing Status:   Future     Standing Expiration Date:   6/10/2025    TSH     Standing Status:   Future     Standing Expiration Date:   6/10/2025    Microalbumin, Ur     Standing Status:   Future     Standing Expiration Date:   6/10/2025

## 2024-06-11 DIAGNOSIS — E11.9 TYPE 2 DIABETES MELLITUS WITHOUT COMPLICATION, WITHOUT LONG-TERM CURRENT USE OF INSULIN (HCC): ICD-10-CM

## 2024-06-11 DIAGNOSIS — E03.8 HYPOTHYROIDISM DUE TO HASHIMOTO'S THYROIDITIS: ICD-10-CM

## 2024-06-11 DIAGNOSIS — E06.3 HYPOTHYROIDISM DUE TO HASHIMOTO'S THYROIDITIS: ICD-10-CM

## 2024-06-11 DIAGNOSIS — Z95.5 STENTED CORONARY ARTERY: ICD-10-CM

## 2024-06-11 DIAGNOSIS — R31.1 BENIGN ESSENTIAL MICROSCOPIC HEMATURIA: ICD-10-CM

## 2024-06-11 DIAGNOSIS — I10 ESSENTIAL HYPERTENSION, BENIGN: Chronic | ICD-10-CM

## 2024-06-11 LAB
ALBUMIN: 4.2 G/DL (ref 3.5–5.2)
ALP BLD-CCNC: 70 U/L (ref 35–104)
ALT SERPL-CCNC: 21 U/L (ref 0–32)
ANION GAP SERPL CALCULATED.3IONS-SCNC: 11 MMOL/L (ref 7–16)
AST SERPL-CCNC: 24 U/L (ref 0–31)
BACTERIA: ABNORMAL
BASOPHILS ABSOLUTE: 0.05 K/UL (ref 0–0.2)
BASOPHILS RELATIVE PERCENT: 1 % (ref 0–2)
BILIRUB SERPL-MCNC: 0.5 MG/DL (ref 0–1.2)
BILIRUBIN, URINE: NEGATIVE
BUN BLDV-MCNC: 13 MG/DL (ref 6–23)
CALCIUM SERPL-MCNC: 10.1 MG/DL (ref 8.6–10.2)
CHLORIDE BLD-SCNC: 106 MMOL/L (ref 98–107)
CHOLESTEROL, TOTAL: 135 MG/DL
CO2: 25 MMOL/L (ref 22–29)
COLOR: YELLOW
CREAT SERPL-MCNC: 0.9 MG/DL (ref 0.5–1)
CREATININE URINE: 127.7 MG/DL (ref 29–226)
EOSINOPHILS ABSOLUTE: 0.23 K/UL (ref 0.05–0.5)
EOSINOPHILS RELATIVE PERCENT: 4 % (ref 0–6)
EPITHELIAL CELLS, UA: ABNORMAL /HPF
GFR, ESTIMATED: 65 ML/MIN/1.73M2
GLUCOSE BLD-MCNC: 128 MG/DL (ref 74–99)
GLUCOSE URINE: NEGATIVE MG/DL
HCT VFR BLD CALC: 40.2 % (ref 34–48)
HDLC SERPL-MCNC: 46 MG/DL
HEMOGLOBIN: 12.9 G/DL (ref 11.5–15.5)
IMMATURE GRANULOCYTES %: 0 % (ref 0–5)
IMMATURE GRANULOCYTES ABSOLUTE: <0.03 K/UL (ref 0–0.58)
KETONES, URINE: NEGATIVE MG/DL
LDL CHOLESTEROL: 67 MG/DL
LEUKOCYTE ESTERASE, URINE: ABNORMAL
LYMPHOCYTES ABSOLUTE: 1.42 K/UL (ref 1.5–4)
LYMPHOCYTES RELATIVE PERCENT: 26 % (ref 20–42)
MCH RBC QN AUTO: 32 PG (ref 26–35)
MCHC RBC AUTO-ENTMCNC: 32.1 G/DL (ref 32–34.5)
MCV RBC AUTO: 99.8 FL (ref 80–99.9)
MICROALBUMIN/CREAT 24H UR: 14 MG/L (ref 0–19)
MICROALBUMIN/CREAT UR-RTO: 11 MCG/MG CREAT (ref 0–30)
MONOCYTES ABSOLUTE: 0.4 K/UL (ref 0.1–0.95)
MONOCYTES RELATIVE PERCENT: 7 % (ref 2–12)
NEUTROPHILS ABSOLUTE: 3.3 K/UL (ref 1.8–7.3)
NEUTROPHILS RELATIVE PERCENT: 61 % (ref 43–80)
NITRITE, URINE: NEGATIVE
PDW BLD-RTO: 13.4 % (ref 11.5–15)
PH, URINE: 6 (ref 5–9)
PLATELET, FLUORESCENCE: 181 K/UL (ref 130–450)
PMV BLD AUTO: 13.5 FL (ref 7–12)
POTASSIUM SERPL-SCNC: 5 MMOL/L (ref 3.5–5)
PROTEIN UA: NEGATIVE MG/DL
RBC # BLD: 4.03 M/UL (ref 3.5–5.5)
RBC UA: ABNORMAL /HPF
SODIUM BLD-SCNC: 142 MMOL/L (ref 132–146)
SPECIFIC GRAVITY UA: 1.02 (ref 1–1.03)
T4 FREE: 1.3 NG/DL (ref 0.9–1.7)
TOTAL PROTEIN: 7.8 G/DL (ref 6.4–8.3)
TRIGL SERPL-MCNC: 111 MG/DL
TSH SERPL DL<=0.05 MIU/L-ACNC: 1.57 UIU/ML (ref 0.27–4.2)
TURBIDITY: CLEAR
URINE HGB: ABNORMAL
UROBILINOGEN, URINE: 0.2 EU/DL (ref 0–1)
VLDLC SERPL CALC-MCNC: 22 MG/DL
WBC # BLD: 5.4 K/UL (ref 4.5–11.5)
WBC UA: ABNORMAL /HPF

## 2024-06-24 ENCOUNTER — OFFICE VISIT (OUTPATIENT)
Dept: ORTHOPEDIC SURGERY | Age: 79
End: 2024-06-24
Payer: MEDICARE

## 2024-06-24 DIAGNOSIS — M25.561 ACUTE PAIN OF RIGHT KNEE: Primary | ICD-10-CM

## 2024-06-24 DIAGNOSIS — M17.11 PRIMARY OSTEOARTHRITIS OF RIGHT KNEE: ICD-10-CM

## 2024-06-24 PROCEDURE — 1123F ACP DISCUSS/DSCN MKR DOCD: CPT | Performed by: FAMILY MEDICINE

## 2024-06-24 PROCEDURE — G8427 DOCREV CUR MEDS BY ELIG CLIN: HCPCS | Performed by: FAMILY MEDICINE

## 2024-06-24 PROCEDURE — 99203 OFFICE O/P NEW LOW 30 MIN: CPT | Performed by: FAMILY MEDICINE

## 2024-06-24 PROCEDURE — G8417 CALC BMI ABV UP PARAM F/U: HCPCS | Performed by: FAMILY MEDICINE

## 2024-06-24 PROCEDURE — 1036F TOBACCO NON-USER: CPT | Performed by: FAMILY MEDICINE

## 2024-06-24 PROCEDURE — 1090F PRES/ABSN URINE INCON ASSESS: CPT | Performed by: FAMILY MEDICINE

## 2024-06-24 PROCEDURE — 20610 DRAIN/INJ JOINT/BURSA W/O US: CPT | Performed by: FAMILY MEDICINE

## 2024-06-24 PROCEDURE — G8399 PT W/DXA RESULTS DOCUMENT: HCPCS | Performed by: FAMILY MEDICINE

## 2024-06-24 RX ORDER — LIDOCAINE HYDROCHLORIDE 10 MG/ML
3 INJECTION, SOLUTION INFILTRATION; PERINEURAL ONCE
Status: CANCELLED | OUTPATIENT
Start: 2024-06-24 | End: 2024-06-24

## 2024-06-24 RX ORDER — TRIAMCINOLONE ACETONIDE 40 MG/ML
40 INJECTION, SUSPENSION INTRA-ARTICULAR; INTRAMUSCULAR ONCE
Status: CANCELLED | OUTPATIENT
Start: 2024-06-24 | End: 2024-06-24

## 2024-06-24 RX ORDER — LIDOCAINE HYDROCHLORIDE 10 MG/ML
5 INJECTION, SOLUTION INFILTRATION; PERINEURAL ONCE
Status: COMPLETED | OUTPATIENT
Start: 2024-06-24 | End: 2024-06-24

## 2024-06-24 RX ORDER — TRIAMCINOLONE ACETONIDE 40 MG/ML
40 INJECTION, SUSPENSION INTRA-ARTICULAR; INTRAMUSCULAR ONCE
Status: COMPLETED | OUTPATIENT
Start: 2024-06-24 | End: 2024-06-24

## 2024-06-24 RX ADMIN — LIDOCAINE HYDROCHLORIDE 5 ML: 10 INJECTION, SOLUTION INFILTRATION; PERINEURAL at 13:02

## 2024-06-24 RX ADMIN — TRIAMCINOLONE ACETONIDE 40 MG: 40 INJECTION, SUSPENSION INTRA-ARTICULAR; INTRAMUSCULAR at 13:02

## 2024-06-24 NOTE — PROGRESS NOTES
[facility]  PRIMARY CARE SPORTS MEDICINE  DATE OF VISIT : 2024    Patient : Camila Ha  Age : 78 y.o.   : 1945  MRN : [id number]   ______________________________________________________________________    Chief Complaint :   Chief Complaint   Patient presents with    Knee Pain     Right   swollen and can't bend knee and can't kneel   3 weeks ago         HPI : Camila Ha is 78 y.o. female who presented to the clinic today for evaluation of right knee pain. Onset of the symptoms was a few weeks ago, with no known mechanism of injury.  Current symptoms include pain and swelling.  Patient denies mechanical symptoms.  Pain is aggravated by any weight bearing activity. Evaluation to date: XRs of the right knee which demonstrate no acute fractures or dislocations.  Treatment to date: avoidance of offending activity and OTC analgesics which are not very effective.     Past Medical History :  Past Medical History:   Diagnosis Date    CAD (coronary artery disease)     follows with Dr. Ramirez yearly (next appt3)    Diabetes mellitus (HCC)     Essential hypertension, benign 3/26/2014    History of colon polyps     Hyperinsulinism     Hyperlipidemia     Hypertension     Hypothyroidism      Past Surgical History:   Procedure Laterality Date    APPENDECTOMY      CARDIAC CATHETERIZATION  2011    CARDIAC CATHETERIZATION  2015    Dr. Rhodes    CARDIAC SURGERY      stent placement    CARPAL TUNNEL RELEASE Bilateral     CARPAL TUNNEL RELEASE Left 2013    COLONOSCOPY      COLONOSCOPY  2012    CORONARY ANGIOPLASTY      DIAGNOSTIC CARDIAC CATH LAB PROCEDURE      ENDOSCOPY, COLON, DIAGNOSTIC      EYE SURGERY Right     cataract    HYSTERECTOMY (CERVIX STATUS UNKNOWN)      Complete at age 34    HYSTERECTOMY, TOTAL ABDOMINAL (CERVIX REMOVED)      POLYPECTOMY      PTCA      WITH STENT 2 TIMES     UPPER GASTROINTESTINAL ENDOSCOPY         Allergies :   No Known Allergies    Medication

## 2024-08-05 DIAGNOSIS — E78.49 OTHER HYPERLIPIDEMIA: ICD-10-CM

## 2024-08-05 RX ORDER — ROSUVASTATIN CALCIUM 10 MG/1
10 TABLET, COATED ORAL NIGHTLY
Qty: 90 TABLET | Refills: 3 | Status: SHIPPED | OUTPATIENT
Start: 2024-08-05

## 2024-08-05 NOTE — TELEPHONE ENCOUNTER
Last Appointment:  6/10/2024  Future Appointments   Date Time Provider Department Center   11/12/2024  8:20 AM Walter Ramirez MD [facility]   12/10/2024  9:30 AM Yobani Navas MD [facility]      Patient called for refill.  She stated someone called today to schedule a Diabetes check and she stated she sees an Endocrinologist, she sees them every 6 months, sees next week.

## 2024-08-13 LAB
ALBUMIN SERPL-MCNC: 3.9 G/DL (ref 3.5–5.2)
ALP SERPL-CCNC: 60 U/L (ref 35–104)
ALT SERPL-CCNC: 20 U/L (ref 0–32)
ANION GAP SERPL CALCULATED.3IONS-SCNC: 15 MMOL/L (ref 7–16)
AST SERPL-CCNC: 25 U/L (ref 0–31)
BILIRUB SERPL-MCNC: 0.5 MG/DL (ref 0–1.2)
BUN SERPL-MCNC: 15 MG/DL (ref 6–23)
CALCIUM SERPL-MCNC: 10.5 MG/DL (ref 8.6–10.2)
CHLORIDE SERPL-SCNC: 105 MMOL/L (ref 98–107)
CO2 SERPL-SCNC: 22 MMOL/L (ref 22–29)
CREAT SERPL-MCNC: 0.9 MG/DL (ref 0.5–1)
GFR, ESTIMATED: 63 ML/MIN/1.73M2
GLUCOSE SERPL-MCNC: 146 MG/DL (ref 74–99)
POTASSIUM SERPL-SCNC: 5.2 MMOL/L (ref 3.5–5)
PROT SERPL-MCNC: 7.2 G/DL (ref 6.4–8.3)
SODIUM SERPL-SCNC: 142 MMOL/L (ref 132–146)
T4 FREE SERPL-MCNC: 1.3 NG/DL (ref 0.9–1.7)
TSH SERPL DL<=0.05 MIU/L-ACNC: 2.84 UIU/ML (ref 0.27–4.2)

## 2024-08-14 ENCOUNTER — TELEPHONE (OUTPATIENT)
Dept: FAMILY MEDICINE CLINIC | Age: 79
End: 2024-08-14

## 2024-08-14 NOTE — TELEPHONE ENCOUNTER
5.2 is not a problem. We have been getting some elevated readings recently we suspect is lab issues. 5.2 not a concern at any rate

## 2024-08-14 NOTE — TELEPHONE ENCOUNTER
Patient had labs drawn for Dr Perera through our lab, she noticed on MyChart that her potassium level is elevated at 5.2. She wanted to know if this needs repeated or if anything is needed at this time.

## 2024-08-16 ENCOUNTER — OFFICE VISIT (OUTPATIENT)
Dept: FAMILY MEDICINE CLINIC | Age: 79
End: 2024-08-16

## 2024-08-16 VITALS
OXYGEN SATURATION: 95 % | DIASTOLIC BLOOD PRESSURE: 80 MMHG | HEIGHT: 60 IN | HEART RATE: 83 BPM | BODY MASS INDEX: 30.63 KG/M2 | WEIGHT: 156 LBS | TEMPERATURE: 97.2 F | SYSTOLIC BLOOD PRESSURE: 144 MMHG

## 2024-08-16 DIAGNOSIS — B35.3 TINEA PEDIS OF RIGHT FOOT: Primary | ICD-10-CM

## 2024-08-16 RX ORDER — TRIAMCINOLONE ACETONIDE 1 MG/G
OINTMENT TOPICAL 2 TIMES DAILY
Qty: 30 G | Refills: 0 | Status: SHIPPED | OUTPATIENT
Start: 2024-08-16 | End: 2024-08-23

## 2024-08-16 RX ORDER — CLOTRIMAZOLE 1 %
CREAM (GRAM) TOPICAL
Qty: 40 G | Refills: 1 | Status: SHIPPED | OUTPATIENT
Start: 2024-08-16 | End: 2024-08-23

## 2024-08-16 NOTE — PROGRESS NOTES
[facility] [address] [address]  Dept: [phone]  Dept Fax: [phone]  Loc: [phone]   DATE OF VISIT : 2024      Patient:  Camila Ha  Age: 78 y.o.       : 1945      Chief complaint:   Chief Complaint   Patient presents with    Foot Problem     Right foot, itching and rash around heel and ankle            History of Present Illness     Camila Ha is a 78 y.o. female who presented to the clinic today for patient presents today with right foot itchiness.    Patient reports feeling right foot itchiness and rash for the past week or 2.  Has attempted to apply lotions for dry and cracked skin to no alleviation.  Does report walking barefoot often outside.  She denies any symptoms on her left foot at this time.  Has spread since initial onset.  Does experience some red spots.    Medication List:    Current Outpatient Medications   Medication Sig Dispense Refill    clotrimazole (LOTRIMIN AF) 1 % cream Apply topically 2 times daily. 40 g 1    triamcinolone (KENALOG) 0.1 % ointment Apply topically 2 times daily for 7 days 30 g 0    rosuvastatin (CRESTOR) 10 MG tablet Take 1 tablet by mouth nightly 90 tablet 3    clobetasol (TEMOVATE) 0.05 % cream Apply 60 g topically daily as needed      SYNTHROID 75 MCG tablet Take 1 tablet by mouth every morning 90 tablet 1    amLODIPine (NORVASC) 5 MG tablet TAKE 1 TABLET DAILY AT NIGHT 90 tablet 1    olmesartan (BENICAR) 20 MG tablet TAKE 2 TABLETS EVERY MORNING 180 tablet 1    nitroGLYCERIN (NITROSTAT) 0.4 MG SL tablet Place 1 tablet under the tongue every 5 minutes as needed for Chest pain 25 tablet 3    omeprazole (PRILOSEC) 40 MG delayed release capsule Take 1 capsule by mouth every morning 90 capsule 3    Multiple Vitamins-Minerals (MULTIVITAMIN WOMEN 50+ PO) Take 1 tablet by mouth daily      metoprolol succinate (TOPROL XL) 25 MG extended release tablet Take

## 2024-09-04 ENCOUNTER — TELEPHONE (OUTPATIENT)
Dept: FAMILY MEDICINE CLINIC | Age: 79
End: 2024-09-04

## 2024-09-04 NOTE — TELEPHONE ENCOUNTER
Patient calling in stating Dr. Disla's office called with her cardiac holter monitor results.  Patient states that her \"upper chamber is not pumping enough\".  Patient states she was told to increase toprol to 37.5 mg.  Patient states Dr. Disla is on vacation and she would like Dr. Navas to review results.  Patient was crying and upset.  Called Dr. Disla's office to fax results of holter monitor.  Will fax results to [phone].

## 2024-09-05 NOTE — TELEPHONE ENCOUNTER
Patient was informed and gave a verbal understanding.   Patient stated that the Cardiologist spoke with her yesterday and they decided to put her on metoprolol 25 mg take 1.5 tablets daily.     Patient wanted to thank Dr. Navas for review the monitor for her.

## 2024-09-05 NOTE — TELEPHONE ENCOUNTER
I am not Cardiologist but I reviewed her holter. Total burden PVC's 3% (not a lot) but 3 different morphologies and some bigeminy. BB like metoprolol best overall strategy to reduce or eliminate. Wouldn't worry but would follow their recommendations on her BB

## 2024-09-09 DIAGNOSIS — I10 ESSENTIAL HYPERTENSION, BENIGN: Chronic | ICD-10-CM

## 2024-09-09 RX ORDER — METOPROLOL SUCCINATE 25 MG/1
12.5 TABLET, EXTENDED RELEASE ORAL 2 TIMES DAILY
Qty: 90 TABLET | Refills: 3 | Status: CANCELLED | OUTPATIENT
Start: 2024-09-09

## 2024-09-10 LAB
ANION GAP SERPL CALCULATED.3IONS-SCNC: 11 MMOL/L (ref 7–16)
BUN SERPL-MCNC: 21 MG/DL (ref 6–23)
CALCIUM SERPL-MCNC: 10 MG/DL (ref 8.6–10.2)
CHLORIDE SERPL-SCNC: 105 MMOL/L (ref 98–107)
CO2 SERPL-SCNC: 24 MMOL/L (ref 22–29)
CREAT SERPL-MCNC: 0.8 MG/DL (ref 0.5–1)
GFR, ESTIMATED: 78 ML/MIN/1.73M2
GLUCOSE SERPL-MCNC: 119 MG/DL (ref 74–99)
POTASSIUM SERPL-SCNC: 4.8 MMOL/L (ref 3.5–5)
SODIUM SERPL-SCNC: 140 MMOL/L (ref 132–146)

## 2024-09-10 RX ORDER — METOPROLOL SUCCINATE 25 MG/1
25 TABLET, EXTENDED RELEASE ORAL SEE ADMIN INSTRUCTIONS
Qty: 135 TABLET | Refills: 1 | Status: SHIPPED | OUTPATIENT
Start: 2024-09-10 | End: 2025-03-09

## 2024-10-11 ENCOUNTER — TELEPHONE (OUTPATIENT)
Dept: FAMILY MEDICINE CLINIC | Age: 79
End: 2024-10-11

## 2024-10-11 ENCOUNTER — OFFICE VISIT (OUTPATIENT)
Dept: FAMILY MEDICINE CLINIC | Age: 79
End: 2024-10-11

## 2024-10-11 VITALS
OXYGEN SATURATION: 97 % | TEMPERATURE: 97.6 F | BODY MASS INDEX: 32.46 KG/M2 | WEIGHT: 161 LBS | SYSTOLIC BLOOD PRESSURE: 140 MMHG | DIASTOLIC BLOOD PRESSURE: 80 MMHG | HEART RATE: 74 BPM | HEIGHT: 59 IN

## 2024-10-11 DIAGNOSIS — H93.A2 PULSATILE TINNITUS OF LEFT EAR: ICD-10-CM

## 2024-10-11 DIAGNOSIS — H93.292 ABNORMAL AUDITORY PERCEPTION OF LEFT EAR: Primary | ICD-10-CM

## 2024-10-11 RX ORDER — BLOOD SUGAR DIAGNOSTIC
STRIP MISCELLANEOUS
COMMUNITY
Start: 2023-12-21

## 2024-10-11 ASSESSMENT — ENCOUNTER SYMPTOMS
ABDOMINAL PAIN: 0
CHEST TIGHTNESS: 0
SINUS PAIN: 0
WHEEZING: 0
VOMITING: 0
NAUSEA: 0
COUGH: 0
SINUS PRESSURE: 0
EYES NEGATIVE: 1
SORE THROAT: 0
DIARRHEA: 0
SHORTNESS OF BREATH: 0

## 2024-10-11 NOTE — TELEPHONE ENCOUNTER
Patient called and was informed of below result.   She will call us and let us know how she is when she gets back from vacation and if she wants a referral to ENT

## 2024-10-11 NOTE — TELEPHONE ENCOUNTER
Please notify patient that carotid ultrasound did not demonstrate anything of significance.  If symptoms persist may need scanning of internal auditory canal and ENT referral.  Should follow-up with PCP as soon as she can.  States she is going on vacation next week for a few days if any worsening of symptoms in the interim needs to go to ER.

## 2024-10-12 ENCOUNTER — TELEPHONE (OUTPATIENT)
Dept: FAMILY MEDICINE CLINIC | Age: 79
End: 2024-10-12

## 2024-10-12 ENCOUNTER — APPOINTMENT (OUTPATIENT)
Dept: CT IMAGING | Age: 79
End: 2024-10-12
Payer: MEDICARE

## 2024-10-12 ENCOUNTER — HOSPITAL ENCOUNTER (EMERGENCY)
Age: 79
Discharge: HOME | End: 2024-10-12
Attending: STUDENT IN AN ORGANIZED HEALTH CARE EDUCATION/TRAINING PROGRAM
Payer: MEDICARE

## 2024-10-12 VITALS
HEIGHT: 59 IN | TEMPERATURE: 97.5 F | RESPIRATION RATE: 16 BRPM | OXYGEN SATURATION: 97 % | WEIGHT: 158 LBS | HEART RATE: 80 BPM | SYSTOLIC BLOOD PRESSURE: 151 MMHG | BODY MASS INDEX: 31.85 KG/M2 | DIASTOLIC BLOOD PRESSURE: 66 MMHG

## 2024-10-12 DIAGNOSIS — H93.12 TINNITUS OF LEFT EAR: Primary | ICD-10-CM

## 2024-10-12 LAB
ANION GAP SERPL CALCULATED.3IONS-SCNC: 7 MMOL/L (ref 7–16)
BASOPHILS # BLD: 0.05 K/UL (ref 0–0.2)
BASOPHILS NFR BLD: 1 % (ref 0–2)
BUN SERPL-MCNC: 14 MG/DL (ref 6–23)
CALCIUM SERPL-MCNC: 10 MG/DL (ref 8.6–10.2)
CHLORIDE SERPL-SCNC: 103 MMOL/L (ref 98–107)
CO2 SERPL-SCNC: 27 MMOL/L (ref 22–29)
CREAT SERPL-MCNC: 0.8 MG/DL (ref 0.5–1)
EKG ATRIAL RATE: 78 BPM
EKG P AXIS: 19 DEGREES
EKG P-R INTERVAL: 152 MS
EKG Q-T INTERVAL: 412 MS
EKG QRS DURATION: 74 MS
EKG QTC CALCULATION (BAZETT): 469 MS
EKG R AXIS: 17 DEGREES
EKG T AXIS: 35 DEGREES
EKG VENTRICULAR RATE: 78 BPM
EOSINOPHIL # BLD: 0.14 K/UL (ref 0.05–0.5)
EOSINOPHILS RELATIVE PERCENT: 2 % (ref 0–6)
ERYTHROCYTE [DISTWIDTH] IN BLOOD BY AUTOMATED COUNT: 13.8 % (ref 11.5–15)
GFR, ESTIMATED: 78 ML/MIN/1.73M2
GLUCOSE SERPL-MCNC: 202 MG/DL (ref 74–99)
HCT VFR BLD AUTO: 40.4 % (ref 34–48)
HGB BLD-MCNC: 13 G/DL (ref 11.5–15.5)
IMM GRANULOCYTES # BLD AUTO: <0.03 K/UL (ref 0–0.58)
IMM GRANULOCYTES NFR BLD: 0 % (ref 0–5)
LYMPHOCYTES NFR BLD: 1.67 K/UL (ref 1.5–4)
LYMPHOCYTES RELATIVE PERCENT: 28 % (ref 20–42)
MCH RBC QN AUTO: 31.3 PG (ref 26–35)
MCHC RBC AUTO-ENTMCNC: 32.2 G/DL (ref 32–34.5)
MCV RBC AUTO: 97.3 FL (ref 80–99.9)
MONOCYTES NFR BLD: 0.36 K/UL (ref 0.1–0.95)
MONOCYTES NFR BLD: 6 % (ref 2–12)
NEUTROPHILS NFR BLD: 63 % (ref 43–80)
NEUTS SEG NFR BLD: 3.82 K/UL (ref 1.8–7.3)
PLATELET # BLD AUTO: 182 K/UL (ref 130–450)
PMV BLD AUTO: 13 FL (ref 7–12)
POTASSIUM SERPL-SCNC: 4.6 MMOL/L (ref 3.5–5)
RBC # BLD AUTO: 4.15 M/UL (ref 3.5–5.5)
SODIUM SERPL-SCNC: 137 MMOL/L (ref 132–146)
WBC OTHER # BLD: 6.1 K/UL (ref 4.5–11.5)

## 2024-10-12 PROCEDURE — 6360000004 HC RX CONTRAST MEDICATION: Performed by: RADIOLOGY

## 2024-10-12 PROCEDURE — 93005 ELECTROCARDIOGRAM TRACING: CPT | Performed by: STUDENT IN AN ORGANIZED HEALTH CARE EDUCATION/TRAINING PROGRAM

## 2024-10-12 PROCEDURE — 99285 EMERGENCY DEPT VISIT HI MDM: CPT

## 2024-10-12 PROCEDURE — 70498 CT ANGIOGRAPHY NECK: CPT

## 2024-10-12 PROCEDURE — 80048 BASIC METABOLIC PNL TOTAL CA: CPT

## 2024-10-12 PROCEDURE — 70496 CT ANGIOGRAPHY HEAD: CPT

## 2024-10-12 PROCEDURE — 85025 COMPLETE CBC W/AUTO DIFF WBC: CPT

## 2024-10-12 RX ORDER — IOPAMIDOL 755 MG/ML
75 INJECTION, SOLUTION INTRAVASCULAR
Status: COMPLETED | OUTPATIENT
Start: 2024-10-12 | End: 2024-10-12

## 2024-10-12 RX ADMIN — IOPAMIDOL 75 ML: 755 INJECTION, SOLUTION INTRAVENOUS at 14:36

## 2024-10-12 ASSESSMENT — ENCOUNTER SYMPTOMS
SHORTNESS OF BREATH: 0
DIARRHEA: 0
BACK PAIN: 0
NAUSEA: 0
SORE THROAT: 0
PHOTOPHOBIA: 0
COUGH: 0
ABDOMINAL PAIN: 0

## 2024-10-12 NOTE — ED PROVIDER NOTES
[facility] EMERGENCY DEPARTMENT  EMERGENCY DEPARTMENT ENCOUNTER        Pt Name: Camila Ha  MRN: [id number]  Birthdate 1945  Date of evaluation: 10/12/2024  Provider: Maria Guadalupe Richardson MD  PCP: Yobani Navas MD  Note Started: 1:23 PM EDT 10/12/24    HPI  78 y.o. female sent by outpatient physician for CTA.  Patient states that for the last 10 days, she has been hearing her heartbeat in her left ear.  It seems to be worse at night when she gets up.  She had carotid ultrasounds done, which were unremarkable.  She states physician called her this morning and told her she needed to come to the ED for CTAs as he was concerned for CVA.  Patient denies headache, vision disturbances, numbness, tingling, weakness, or other complaints at this time.      --------------------------------------------- PAST HISTORY ---------------------------------------------  Past Medical History:  has a past medical history of CAD (coronary artery disease), Diabetes mellitus (HCC), Essential hypertension, benign, History of colon polyps, Hyperinsulinism, Hyperlipidemia, Hypertension, and Hypothyroidism.    Past Surgical History:  has a past surgical history that includes Cardiac surgery (4/11); Cardiac catheterization (5/2011); Diagnostic Cardiac Cath Lab Procedure; Coronary angioplasty; Colonoscopy; Endoscopy, colon, diagnostic; Appendectomy; Colonoscopy (07/02/2012); Carpal tunnel release (Bilateral); Cardiac catheterization (1-); eye surgery (Right); Percutaneous Transluminal Coronary Angio (2011); Upper gastrointestinal endoscopy (2012); polypectomy (2012); Carpal tunnel release (Left, 11/2013); Hysterectomy; and Hysterectomy, total abdominal.    Social History:  reports that she quit smoking about 49 years ago. Her smoking use included cigarettes. She started smoking about 64 years ago. She has a 7.5 pack-year smoking history. She has never used smokeless tobacco. She reports that she  Making  78-year-old female sent by walk-in clinic physician for CTA head and neck for complaint of hearing heartbeat in left ear x 10 days.  Patient neurologically intact.  EKG and labs reassuring.  CTA head and neck unremarkable.  Symptom going on for 10 days, intermittent, localized only to pulsing sensation in left ear.  Patient is felt stable for discharge with outpatient follow-up.  She is agreeable to plan.  She was given number for ENT for follow-up.    Differential diagnoses include but not limited to: Arrhythmia, electrolyte abnormality, CVA, dissection, etc.      Amount and/or Complexity of Data Reviewed  External Data Reviewed: notes.     Details: Telephone encounter by Dr. Zepeda, internal medicine, 10/12/2024-patient still having pulsatile sensation to left ear, carotid ultrasound unremarkable, patient encouraged to go to the ED for CTA head/neck or MRI  Labs: ordered.     Details: All labs interpreted by me: No leukocytosis no anemia, electrolytes within normal limits, mild hyperglycemia    Radiology: ordered and independent interpretation performed.     Details: CTA head-no obvious LVO or intracranial hemorrhage  CTA neck-no obvious dissection  All imaging reviewed by me, final interpretation per radiologist    ECG/medicine tests: ordered and independent interpretation performed.     Details: Rate 78, normal sinus rhythm, no STEMI, occasional PVC, compared to most recent EKG PVCs now present        Is this patient to be included in the SEP-1 core measure? No Exclusion criteria - the patient is NOT to be included for SEP-1 Core Measure due to: Infection is not suspected             ------------------------------------------ PROGRESS NOTES ------------------------------------------  I have spoken with the patient and discussed today’s results, in addition to providing specific details for the plan of care and counseling regarding the diagnosis and prognosis.  Their questions are answered at this time and

## 2024-10-12 NOTE — PROGRESS NOTES
[facility]     10/11/24  [facility] : 1945 Sex: female  Age: 78 y.o.    Chief Complaint   Patient presents with    Otalgia     Patient reports left ear pain starting Tuesday that went away within a few days. Patient reports that since that ear pain went away she has been having a pulsating noise in the left ear.       HPI    Patient presents to express care today complaining of recent left ear pulsating sound that comes and goes.  States started about 10 days ago.  Did have some ear pain few days ago which resolved.  But pulsating sound continues off and on.  Cannot tell me what brings it on or makes it any better.  Denies hearing difficulty.  Denies any drainage.  Is not complaining of other upper respiratory tract complaints.  No visual difficulty.  No neurological symptoms.  Denies headache.  No fever or chills.    Review of Systems   Constitutional:  Negative for chills, fatigue and fever.   HENT:  Positive for tinnitus. Negative for congestion, ear pain, postnasal drip, sinus pressure, sinus pain and sore throat.         See above   Eyes: Negative.    Respiratory:  Negative for cough, chest tightness, shortness of breath and wheezing.    Cardiovascular:  Negative for chest pain.        History of heart disease and hypertension.   Gastrointestinal:  Negative for abdominal pain, diarrhea, nausea and vomiting.   Endocrine:        Type II diabetic   Neurological:  Negative for dizziness, syncope, speech difficulty, weakness, light-headedness, numbness and headaches.               REST OF PERTINENT ROS GONE OVER AND WAS NEGATIVE.                 Current Outpatient Medications:     blood glucose test strips (EXACTECH TEST) strip, USE DAILY, Disp: , Rfl:     metoprolol succinate (TOPROL XL) 25 MG extended release tablet, Take 1 tablet by mouth See Admin Instructions Take 1.5 mg daily, Disp: 135 tablet, Rfl: 1    rosuvastatin (CRESTOR) 10 MG tablet, Take 1 tablet by mouth nightly, Disp: 90

## 2024-10-12 NOTE — TELEPHONE ENCOUNTER
I spoke with patient this morning to see how she was doing from her visit yesterday..  She states she is still having the pulsatile sensation to the left ear.  It does come and go.  We did do a carotid ultrasound yesterday which was unremarkable.  I told her at this point with her continued symptoms and the fact she is leaving the area in a couple days I would recommend that she go through the emergency room and be reevaluated with consideration for further imaging such as CTA head neck or MRI.  She states she is going to go through [facility] emergency room and have them see her.

## 2024-10-14 ENCOUNTER — TELEPHONE (OUTPATIENT)
Dept: ENT CLINIC | Age: 79
End: 2024-10-14

## 2024-10-14 LAB
EKG ATRIAL RATE: 78 BPM
EKG P AXIS: 19 DEGREES
EKG P-R INTERVAL: 152 MS
EKG Q-T INTERVAL: 412 MS
EKG QRS DURATION: 74 MS
EKG QTC CALCULATION (BAZETT): 469 MS
EKG R AXIS: 17 DEGREES
EKG T AXIS: 35 DEGREES
EKG VENTRICULAR RATE: 78 BPM

## 2024-10-14 PROCEDURE — 93010 ELECTROCARDIOGRAM REPORT: CPT | Performed by: INTERNAL MEDICINE

## 2024-10-14 NOTE — TELEPHONE ENCOUNTER
Pt phnd to schedule ER fup appt with Dr Matson.  Pt will not be back in town until 10/21/24. Please call pt to schedule due to pt care [phone]

## 2024-10-30 ENCOUNTER — NURSE ONLY (OUTPATIENT)
Dept: FAMILY MEDICINE CLINIC | Age: 79
End: 2024-10-30
Payer: MEDICARE

## 2024-10-30 VITALS — TEMPERATURE: 97.8 F

## 2024-10-30 DIAGNOSIS — Z23 NEED FOR INFLUENZA VACCINATION: Primary | ICD-10-CM

## 2024-10-30 PROCEDURE — G0008 ADMIN INFLUENZA VIRUS VAC: HCPCS | Performed by: FAMILY MEDICINE

## 2024-10-30 PROCEDURE — 90653 IIV ADJUVANT VACCINE IM: CPT | Performed by: FAMILY MEDICINE

## 2024-12-03 ENCOUNTER — TELEPHONE (OUTPATIENT)
Dept: FAMILY MEDICINE CLINIC | Age: 79
End: 2024-12-03

## 2024-12-03 DIAGNOSIS — E06.3 HYPOTHYROIDISM DUE TO HASHIMOTO'S THYROIDITIS: ICD-10-CM

## 2024-12-03 RX ORDER — LEVOTHYROXINE SODIUM 75 MCG
75 TABLET ORAL DAILY
Qty: 7 TABLET | Refills: 0 | Status: CANCELLED | OUTPATIENT
Start: 2024-12-03

## 2024-12-03 RX ORDER — LEVOTHYROXINE SODIUM 75 MCG
75 TABLET ORAL EVERY MORNING
Qty: 90 TABLET | Refills: 1 | Status: SHIPPED
Start: 2024-12-03 | End: 2024-12-10 | Stop reason: SDUPTHER

## 2024-12-03 RX ORDER — LEVOTHYROXINE SODIUM 75 UG/1
75 TABLET ORAL DAILY
Qty: 7 TABLET | Refills: 0 | Status: SHIPPED
Start: 2024-12-03 | End: 2024-12-10

## 2024-12-03 RX ORDER — LEVOTHYROXINE SODIUM 75 MCG
75 TABLET ORAL EVERY MORNING
Qty: 90 TABLET | Refills: 1 | Status: CANCELLED | OUTPATIENT
Start: 2024-12-03

## 2024-12-03 NOTE — TELEPHONE ENCOUNTER
Patient called to request a prescription be sent to [facility] today for Synthroid. She has 2 doses left and will be out. She is leaving for [place] for 2 months and is hoping [facility] can ship them to her before they leave. She would also like to have a 7 day Rx sent to [facility] in [place].     90 day and 7 day scripts are ready for you to sign

## 2024-12-04 LAB — DIABETIC RETINOPATHY: NEGATIVE

## 2024-12-04 NOTE — TELEPHONE ENCOUNTER
Dr. Randall patient also needs a week supply sent to [facility] in [place] in the mean time as well.     Please advise.

## 2024-12-10 ENCOUNTER — OFFICE VISIT (OUTPATIENT)
Dept: FAMILY MEDICINE CLINIC | Age: 79
End: 2024-12-10

## 2024-12-10 VITALS
OXYGEN SATURATION: 98 % | TEMPERATURE: 98.7 F | WEIGHT: 159.2 LBS | SYSTOLIC BLOOD PRESSURE: 122 MMHG | HEART RATE: 83 BPM | RESPIRATION RATE: 17 BRPM | HEIGHT: 59 IN | DIASTOLIC BLOOD PRESSURE: 82 MMHG | BODY MASS INDEX: 32.09 KG/M2

## 2024-12-10 DIAGNOSIS — I10 ESSENTIAL HYPERTENSION, BENIGN: Chronic | ICD-10-CM

## 2024-12-10 DIAGNOSIS — Z00.00 MEDICARE ANNUAL WELLNESS VISIT, SUBSEQUENT: Primary | ICD-10-CM

## 2024-12-10 DIAGNOSIS — E78.49 OTHER HYPERLIPIDEMIA: ICD-10-CM

## 2024-12-10 DIAGNOSIS — E06.3 HYPOTHYROIDISM DUE TO HASHIMOTO'S THYROIDITIS: ICD-10-CM

## 2024-12-10 DIAGNOSIS — E11.9 CONTROLLED TYPE 2 DIABETES MELLITUS WITHOUT COMPLICATION, WITHOUT LONG-TERM CURRENT USE OF INSULIN (HCC): ICD-10-CM

## 2024-12-10 RX ORDER — OLMESARTAN MEDOXOMIL 20 MG/1
TABLET ORAL
Qty: 180 TABLET | Refills: 1 | Status: SHIPPED | OUTPATIENT
Start: 2024-12-10

## 2024-12-10 RX ORDER — METOPROLOL SUCCINATE 50 MG/1
50 TABLET, EXTENDED RELEASE ORAL NIGHTLY
COMMUNITY
Start: 2024-10-29

## 2024-12-10 RX ORDER — AMLODIPINE BESYLATE 5 MG/1
5 TABLET ORAL DAILY
Qty: 90 TABLET | Refills: 1 | Status: SHIPPED | OUTPATIENT
Start: 2024-12-10

## 2024-12-10 RX ORDER — LEVOTHYROXINE SODIUM 75 MCG
75 TABLET ORAL EVERY MORNING
Qty: 90 TABLET | Refills: 1 | Status: SHIPPED | OUTPATIENT
Start: 2024-12-10

## 2024-12-10 ASSESSMENT — ENCOUNTER SYMPTOMS
CHEST TIGHTNESS: 0
SORE THROAT: 0
ABDOMINAL PAIN: 0
RHINORRHEA: 1
TROUBLE SWALLOWING: 0
WHEEZING: 0
PHOTOPHOBIA: 0
EYES NEGATIVE: 1
DIARRHEA: 0
COUGH: 0
VOMITING: 0
EYE REDNESS: 0
BLOOD IN STOOL: 0
SHORTNESS OF BREATH: 0
COLOR CHANGE: 0
CONSTIPATION: 0

## 2024-12-10 ASSESSMENT — PATIENT HEALTH QUESTIONNAIRE - PHQ9
2. FEELING DOWN, DEPRESSED OR HOPELESS: NOT AT ALL
1. LITTLE INTEREST OR PLEASURE IN DOING THINGS: NOT AT ALL
SUM OF ALL RESPONSES TO PHQ QUESTIONS 1-9: 0
SUM OF ALL RESPONSES TO PHQ9 QUESTIONS 1 & 2: 0
SUM OF ALL RESPONSES TO PHQ QUESTIONS 1-9: 0

## 2024-12-10 NOTE — PROGRESS NOTES
Medicare Annual Wellness Visit    Camila Ha is here for Medicare AWV    Assessment & Plan   Medicare annual wellness visit, subsequent  Hypothyroidism due to Hashimoto's thyroiditis  -     SYNTHROID 75 MCG tablet; Take 1 tablet by mouth every morning, Disp-90 tablet, R-1, DAWNormal  Other hyperlipidemia  Essential hypertension, benign  -     olmesartan (BENICAR) 20 MG tablet; Patient reports taking 1 tablet every morning., Disp-180 tablet, R-1Normal  -     amLODIPine (NORVASC) 5 MG tablet; Take 1 tablet by mouth daily, Disp-90 tablet, R-1Normal  Controlled type 2 diabetes mellitus without complication, without long-term current use of insulin (HCC)    Recommendations for Preventive Services Due: see orders and patient instructions/AVS.  Recommended screening schedule for the next 5-10 years is provided to the patient in written form: see Patient Instructions/AVS.     Return for Medicare Annual Wellness Visit in 1 year.     Subjective   The following acute and/or chronic problems were also addressed today:  See OV note  Patient's complete Health Risk Assessment and screening values have been reviewed and are found in Flowsheets. The following problems were reviewed today and where indicated follow up appointments were made and/or referrals ordered.    Positive Risk Factor Screenings with Interventions:              Inactivity:  On average, how many days per week do you engage in moderate to strenuous exercise (like a brisk walk)?: 1 day (!) Abnormal  On average, how many minutes do you engage in exercise at this level?: 10 min  Interventions:  Will be headed to [facility] for several mos the winter. Promises will be more active     Abnormal BMI (obese):  Body mass index is 32.15 kg/m². (!) Abnormal  Interventions:  Avoid concentrated sweets, sweetened beverages and low carbs after noon meal                           Objective   Vitals:    12/10/24 0933   BP: 122/82   Pulse: 83   Resp: 17   Temp: 98.7 °F

## 2024-12-10 NOTE — PATIENT INSTRUCTIONS
problems.  Where can you learn more?  Go to https://www.appEatIT.net/patientEd and enter F075 to learn more about \"A Healthy Heart: Care Instructions.\"  Current as of: June 24, 2023  Content Version: 14.2  © 2024 [employer].   Care instructions adapted under license by [employer]. If you have questions about a medical condition or this instruction, always ask your healthcare professional. [employer] disclaims any warranty or liability for your use of this information.      Personalized [facility]ive [facility] - 12/10/2024  Medicare offers a range of preventive health benefits. Some of the tests and screenings are paid in full while other may be subject to a deductible, co-insurance, and/or copay.    Some of these benefits include a comprehensive review of your medical history including lifestyle, illnesses that may run in your family, and various assessments and screenings as appropriate.    After reviewing your medical record and screening and assessments performed today your provider may have ordered immunizations, labs, imaging, and/or referrals for you.  A list of these orders (if applicable) as well as your Preventive Care list are included within your After Visit Summary for your review.    Other Preventive Recommendations:    A preventive eye exam performed by an eye specialist is recommended every 1-2 years to screen for glaucoma; cataracts, macular degeneration, and other eye disorders.  A preventive dental visit is recommended every 6 months.  Try to get at least 150 minutes of exercise per week or 10,000 steps per day on a pedometer .  Order or download the FREE \"Exercise & Physical Activity: Your Everyday Guide\" from [employer] [place] [employer]. Call 1-621.355.5680 or search [employer] [place] [employer] online.  You need 1294-8565 mg of calcium and 2369-1816 IU of vitamin D per day. It is possible to meet your calcium requirement with diet alone, but a

## 2024-12-13 ENCOUNTER — TELEPHONE (OUTPATIENT)
Dept: FAMILY MEDICINE CLINIC | Age: 79
End: 2024-12-13

## 2024-12-13 NOTE — TELEPHONE ENCOUNTER
Camila was notified by the insurance that the Amlodepine needs a prior authorization before they can dispense.

## 2024-12-13 NOTE — TELEPHONE ENCOUNTER
I'm on amlodipine and pay cash, about $10 for 90 days. Her insurance is being stupid, if they won't cover she will have to pay cash, but I would complain to them.

## 2024-12-16 NOTE — TELEPHONE ENCOUNTER
CaseId:[id number];Status:Approved;Review Type:Prior Auth;Coverage Start Date:11/16/2024;Coverage End Date:12/31/2099   Medication Approved.

## 2024-12-16 NOTE — TELEPHONE ENCOUNTER
I called and spoke with the insurance and they stated that the Benicar is the one that needs a PA not the amlodipine.   PA started on the benicar.

## 2025-02-06 ENCOUNTER — OFFICE VISIT (OUTPATIENT)
Dept: CARDIOLOGY CLINIC | Age: 80
End: 2025-02-06
Payer: MEDICARE

## 2025-02-06 VITALS
DIASTOLIC BLOOD PRESSURE: 70 MMHG | WEIGHT: 157.7 LBS | SYSTOLIC BLOOD PRESSURE: 140 MMHG | TEMPERATURE: 97.1 F | OXYGEN SATURATION: 97 % | HEIGHT: 59 IN | RESPIRATION RATE: 18 BRPM | HEART RATE: 72 BPM | BODY MASS INDEX: 31.79 KG/M2

## 2025-02-06 DIAGNOSIS — I10 ESSENTIAL HYPERTENSION, BENIGN: ICD-10-CM

## 2025-02-06 DIAGNOSIS — E78.00 PURE HYPERCHOLESTEROLEMIA: ICD-10-CM

## 2025-02-06 DIAGNOSIS — I25.10 CORONARY ARTERY DISEASE INVOLVING NATIVE CORONARY ARTERY OF NATIVE HEART WITHOUT ANGINA PECTORIS: ICD-10-CM

## 2025-02-06 DIAGNOSIS — I49.3 PVC (PREMATURE VENTRICULAR CONTRACTION): ICD-10-CM

## 2025-02-06 PROCEDURE — G8427 DOCREV CUR MEDS BY ELIG CLIN: HCPCS | Performed by: INTERNAL MEDICINE

## 2025-02-06 PROCEDURE — 1123F ACP DISCUSS/DSCN MKR DOCD: CPT | Performed by: INTERNAL MEDICINE

## 2025-02-06 PROCEDURE — 99214 OFFICE O/P EST MOD 30 MIN: CPT | Performed by: INTERNAL MEDICINE

## 2025-02-06 PROCEDURE — 1036F TOBACCO NON-USER: CPT | Performed by: INTERNAL MEDICINE

## 2025-02-06 PROCEDURE — 3078F DIAST BP <80 MM HG: CPT | Performed by: INTERNAL MEDICINE

## 2025-02-06 PROCEDURE — G8399 PT W/DXA RESULTS DOCUMENT: HCPCS | Performed by: INTERNAL MEDICINE

## 2025-02-06 PROCEDURE — 93000 ELECTROCARDIOGRAM COMPLETE: CPT | Performed by: INTERNAL MEDICINE

## 2025-02-06 PROCEDURE — 3077F SYST BP >= 140 MM HG: CPT | Performed by: INTERNAL MEDICINE

## 2025-02-06 PROCEDURE — 1159F MED LIST DOCD IN RCRD: CPT | Performed by: INTERNAL MEDICINE

## 2025-02-06 PROCEDURE — 1160F RVW MEDS BY RX/DR IN RCRD: CPT | Performed by: INTERNAL MEDICINE

## 2025-02-06 PROCEDURE — G8417 CALC BMI ABV UP PARAM F/U: HCPCS | Performed by: INTERNAL MEDICINE

## 2025-02-06 PROCEDURE — 1090F PRES/ABSN URINE INCON ASSESS: CPT | Performed by: INTERNAL MEDICINE

## 2025-02-06 RX ORDER — NITROGLYCERIN 0.4 MG/1
0.4 TABLET SUBLINGUAL EVERY 5 MIN PRN
Qty: 25 TABLET | Refills: 3 | Status: SHIPPED | OUTPATIENT
Start: 2025-02-06

## 2025-02-06 NOTE — PROGRESS NOTES
Patient Active Problem List   Diagnosis    Stented coronary artery - Cx    Essential hypertension, benign    Chest pain    Hypothyroidism    Hyperlipidemia    Diabetes mellitus (HCC)    H/O heart artery stent    Anxiety       Current Outpatient Medications   Medication Sig Dispense Refill    nitroGLYCERIN (NITROSTAT) 0.4 MG SL tablet Place 1 tablet under the tongue every 5 minutes as needed for Chest pain 25 tablet 3    metoprolol succinate (TOPROL XL) 50 MG extended release tablet Take 1 tablet by mouth at bedtime      SYNTHROID 75 MCG tablet Take 1 tablet by mouth every morning 90 tablet 1    olmesartan (BENICAR) 20 MG tablet Patient reports taking 1 tablet every morning. 180 tablet 1    amLODIPine (NORVASC) 5 MG tablet Take 1 tablet by mouth daily 90 tablet 1    blood glucose test strips (EXACTECH TEST) strip USE DAILY      rosuvastatin (CRESTOR) 10 MG tablet Take 1 tablet by mouth nightly 90 tablet 3    omeprazole (PRILOSEC) 40 MG delayed release capsule Take 1 capsule by mouth every morning 90 capsule 3    vitamin D (CHOLECALCIFEROL) 125 MCG (5000 UT) CAPS capsule Take 1 capsule by mouth daily      metFORMIN (GLUCOPHAGE) 500 MG tablet 1 tablet 2 times daily (with meals)      vitamin E 400 UNIT capsule Take 1 capsule by mouth every morning      vitamin C (ASCORBIC ACID) 500 MG tablet Take 2 tablets by mouth daily      aspirin 81 MG chewable tablet Take 1 tablet by mouth every morning       No current facility-administered medications for this visit.       CC:    Patient is seen in follow up for:  1. Coronary artery disease involving native coronary artery of native heart without angina pectoris - Hx stent    2. Pure hypercholesterolemia - LDL 67    3. Essential hypertension, benign    4. PVC (premature ventricular contraction)        HPI:  Denies any difficulties with anginal-like chest pain or unusual shortness of breath.  Tolerating current medications well.  Since last seen she had some palpitations and had

## 2025-02-19 ENCOUNTER — OFFICE VISIT (OUTPATIENT)
Dept: FAMILY MEDICINE CLINIC | Age: 80
End: 2025-02-19

## 2025-02-19 VITALS
TEMPERATURE: 97.1 F | HEIGHT: 59 IN | SYSTOLIC BLOOD PRESSURE: 130 MMHG | DIASTOLIC BLOOD PRESSURE: 82 MMHG | BODY MASS INDEX: 31.25 KG/M2 | RESPIRATION RATE: 17 BRPM | OXYGEN SATURATION: 95 % | WEIGHT: 155 LBS | HEART RATE: 99 BPM

## 2025-02-19 DIAGNOSIS — J01.90 ACUTE BACTERIAL SINUSITIS: ICD-10-CM

## 2025-02-19 DIAGNOSIS — J02.9 SORE THROAT: Primary | ICD-10-CM

## 2025-02-19 DIAGNOSIS — B96.89 ACUTE BACTERIAL SINUSITIS: ICD-10-CM

## 2025-02-19 DIAGNOSIS — E11.9 CONTROLLED TYPE 2 DIABETES MELLITUS WITHOUT COMPLICATION, WITHOUT LONG-TERM CURRENT USE OF INSULIN (HCC): ICD-10-CM

## 2025-02-19 LAB
ALBUMIN SERPL-MCNC: 3.9 G/DL (ref 3.5–5.2)
ALP SERPL-CCNC: 64 U/L (ref 35–104)
ALT SERPL-CCNC: 25 U/L (ref 0–32)
ANION GAP SERPL CALCULATED.3IONS-SCNC: 14 MMOL/L (ref 7–16)
AST SERPL-CCNC: 28 U/L (ref 0–31)
BILIRUB SERPL-MCNC: 0.4 MG/DL (ref 0–1.2)
BUN SERPL-MCNC: 11 MG/DL (ref 6–23)
CALCIUM SERPL-MCNC: 9.9 MG/DL (ref 8.6–10.2)
CHLORIDE SERPL-SCNC: 105 MMOL/L (ref 98–107)
CO2 SERPL-SCNC: 22 MMOL/L (ref 22–29)
CREAT SERPL-MCNC: 0.9 MG/DL (ref 0.5–1)
GFR, ESTIMATED: 69 ML/MIN/1.73M2
GLUCOSE SERPL-MCNC: 137 MG/DL (ref 74–99)
HBA1C MFR BLD: 6.3 %
INFLUENZA A ANTIBODY: NEGATIVE
INFLUENZA B ANTIBODY: NEGATIVE
POTASSIUM SERPL-SCNC: 4.9 MMOL/L (ref 3.5–5)
PROT SERPL-MCNC: 7.2 G/DL (ref 6.4–8.3)
S PYO AG THROAT QL: POSITIVE
SODIUM SERPL-SCNC: 141 MMOL/L (ref 132–146)
T4 FREE SERPL-MCNC: 1.4 NG/DL (ref 0.9–1.7)
TSH SERPL DL<=0.05 MIU/L-ACNC: 1.22 UIU/ML (ref 0.27–4.2)

## 2025-02-19 RX ORDER — CEPHALEXIN 500 MG/1
500 CAPSULE ORAL 2 TIMES DAILY
Qty: 14 CAPSULE | Refills: 0 | Status: SHIPPED | OUTPATIENT
Start: 2025-02-19 | End: 2025-02-26

## 2025-02-19 RX ORDER — METHYLPREDNISOLONE ACETATE 40 MG/ML
40 INJECTION, SUSPENSION INTRA-ARTICULAR; INTRALESIONAL; INTRAMUSCULAR; SOFT TISSUE ONCE
Status: COMPLETED | OUTPATIENT
Start: 2025-02-19 | End: 2025-02-19

## 2025-02-19 RX ORDER — GUAIFENESIN 600 MG/1
600 TABLET, EXTENDED RELEASE ORAL 2 TIMES DAILY
Qty: 30 TABLET | Refills: 0 | Status: SHIPPED | OUTPATIENT
Start: 2025-02-19 | End: 2025-03-06

## 2025-02-19 RX ADMIN — METHYLPREDNISOLONE ACETATE 40 MG: 40 INJECTION, SUSPENSION INTRA-ARTICULAR; INTRALESIONAL; INTRAMUSCULAR; SOFT TISSUE at 08:39

## 2025-02-19 NOTE — PROGRESS NOTES
OFFICE NOTE    25  Name: Camila Ha  :1945   Sex:female   Age:79 y.o.      SUBJECTIVE  Chief Complaint   Patient presents with    Sore Throat     X 3 days     Cough     X 3 days     Congestion     X 3 days        HPI pt comes in with ST, cough, congestion for 3 days    Review of Systems   Low grade temp, malaise, but mostly ST noted. Has not had A1C for a year on our record though believes Dr. Molina had one done 8 mos ago      Current Outpatient Medications:     guaiFENesin (MUCINEX) 600 MG extended release tablet, Take 1 tablet by mouth 2 times daily for 15 days, Disp: 30 tablet, Rfl: 0    cephALEXin (KEFLEX) 500 MG capsule, Take 1 capsule by mouth 2 times daily for 7 days, Disp: 14 capsule, Rfl: 0    nitroGLYCERIN (NITROSTAT) 0.4 MG SL tablet, Place 1 tablet under the tongue every 5 minutes as needed for Chest pain, Disp: 25 tablet, Rfl: 3    metoprolol succinate (TOPROL XL) 50 MG extended release tablet, Take 1 tablet by mouth at bedtime, Disp: , Rfl:     SYNTHROID 75 MCG tablet, Take 1 tablet by mouth every morning, Disp: 90 tablet, Rfl: 1    olmesartan (BENICAR) 20 MG tablet, Patient reports taking 1 tablet every morning., Disp: 180 tablet, Rfl: 1    amLODIPine (NORVASC) 5 MG tablet, Take 1 tablet by mouth daily, Disp: 90 tablet, Rfl: 1    blood glucose test strips (EXACTECH TEST) strip, USE DAILY, Disp: , Rfl:     rosuvastatin (CRESTOR) 10 MG tablet, Take 1 tablet by mouth nightly, Disp: 90 tablet, Rfl: 3    omeprazole (PRILOSEC) 40 MG delayed release capsule, Take 1 capsule by mouth every morning, Disp: 90 capsule, Rfl: 3    vitamin D (CHOLECALCIFEROL) 125 MCG (5000 UT) CAPS capsule, Take 1 capsule by mouth daily, Disp: , Rfl:     metFORMIN (GLUCOPHAGE) 500 MG tablet, 1 tablet 2 times daily (with meals), Disp: , Rfl:     vitamin E 400 UNIT capsule, Take 1 capsule by mouth every morning, Disp: , Rfl:     vitamin C (ASCORBIC ACID) 500 MG tablet, Take 2 tablets by mouth daily, Disp: , Rfl:

## 2025-02-25 DIAGNOSIS — K21.9 GASTROESOPHAGEAL REFLUX DISEASE WITHOUT ESOPHAGITIS: ICD-10-CM

## 2025-02-25 RX ORDER — OMEPRAZOLE 40 MG/1
40 CAPSULE, DELAYED RELEASE ORAL EVERY MORNING
Qty: 90 CAPSULE | Refills: 3 | Status: SHIPPED | OUTPATIENT
Start: 2025-02-25

## 2025-02-25 NOTE — TELEPHONE ENCOUNTER
Name of Medication(s) Requested:  Requested Prescriptions     Pending Prescriptions Disp Refills    omeprazole (PRILOSEC) 40 MG delayed release capsule 90 capsule 3     Sig: Take 1 capsule by mouth every morning       Medication is on current medication list Yes    Dosage and directions were verified? Yes    Quantity verified: 90 day supply     Pharmacy Verified?  Yes    Last Appointment:  2/19/2025    Future appts:  Future Appointments   Date Time Provider Department Center   6/17/2025  8:15 AM Yobani Navas MD [facility]   2/24/2026  8:20 AM Walter Ramirez MD [facility]        (If no appt send self scheduling link. .REFILLAPPT)  Scheduling request sent?     [] Yes  [x] No    Does patient need updated?  [] Yes  [x] No

## 2025-02-27 ENCOUNTER — TELEPHONE (OUTPATIENT)
Dept: FAMILY MEDICINE CLINIC | Age: 80
End: 2025-02-27

## 2025-02-27 NOTE — TELEPHONE ENCOUNTER
Pt states she is almost done with her Mucinex, and is out of Amoxicillen, but her sx have not gone away.  She was seen in express last week.      She still has sinus drainage - draining yellow/green discharge, and coughing a dry cough.  Please advise

## 2025-03-01 ENCOUNTER — OFFICE VISIT (OUTPATIENT)
Dept: FAMILY MEDICINE CLINIC | Age: 80
End: 2025-03-01

## 2025-03-01 VITALS
OXYGEN SATURATION: 97 % | SYSTOLIC BLOOD PRESSURE: 152 MMHG | WEIGHT: 156 LBS | BODY MASS INDEX: 31.45 KG/M2 | HEART RATE: 70 BPM | RESPIRATION RATE: 20 BRPM | HEIGHT: 59 IN | TEMPERATURE: 98.2 F | DIASTOLIC BLOOD PRESSURE: 74 MMHG

## 2025-03-01 DIAGNOSIS — J01.90 ACUTE BACTERIAL SINUSITIS: Primary | ICD-10-CM

## 2025-03-01 DIAGNOSIS — B96.89 ACUTE BACTERIAL SINUSITIS: Primary | ICD-10-CM

## 2025-03-01 RX ORDER — DOXYCYCLINE HYCLATE 100 MG
100 TABLET ORAL 2 TIMES DAILY
Qty: 20 TABLET | Refills: 0 | Status: SHIPPED | OUTPATIENT
Start: 2025-03-01 | End: 2025-03-11

## 2025-03-01 RX ORDER — ALBUTEROL SULFATE 90 UG/1
2 INHALANT RESPIRATORY (INHALATION) 4 TIMES DAILY PRN
Qty: 18 G | Refills: 5 | Status: SHIPPED | OUTPATIENT
Start: 2025-03-01

## 2025-03-01 NOTE — PROGRESS NOTES
OFFICE NOTE    3/1/25  Name: Camila Ha  :1945   Sex:female   Age:79 y.o.      SUBJECTIVE  Chief Complaint   Patient presents with    Cough     Treated with amoxicillin and mucinex about 1 week ago for these symptoms    Congestion     Treated with amoxicillin and mucinex about 1 week ago for these symptoms    Pharyngitis     Treated with amoxicillin and mucinex about 1 week ago for these symptoms    Sinus Problem     Treated with amoxicillin and mucinex about 1 week ago for these symptoms       HPI Still hacking, coughing and blowing her nose. Was given Keflex and methyl pred shot for strep  10 days ago. She believes pharmacy gave her amoxacillin    Review of Systems   No fever, cough not productive. No fever or GI symptoms      Current Outpatient Medications:     doxycycline hyclate (VIBRA-TABS) 100 MG tablet, Take 1 tablet by mouth 2 times daily for 10 days, Disp: 20 tablet, Rfl: 0    albuterol sulfate HFA (VENTOLIN HFA) 108 (90 Base) MCG/ACT inhaler, Inhale 2 puffs into the lungs 4 times daily as needed for Wheezing, Disp: 18 g, Rfl: 5    omeprazole (PRILOSEC) 40 MG delayed release capsule, Take 1 capsule by mouth every morning, Disp: 90 capsule, Rfl: 3    guaiFENesin (MUCINEX) 600 MG extended release tablet, Take 1 tablet by mouth 2 times daily for 15 days, Disp: 30 tablet, Rfl: 0    nitroGLYCERIN (NITROSTAT) 0.4 MG SL tablet, Place 1 tablet under the tongue every 5 minutes as needed for Chest pain, Disp: 25 tablet, Rfl: 3    metoprolol succinate (TOPROL XL) 50 MG extended release tablet, Take 1 tablet by mouth at bedtime, Disp: , Rfl:     SYNTHROID 75 MCG tablet, Take 1 tablet by mouth every morning, Disp: 90 tablet, Rfl: 1    olmesartan (BENICAR) 20 MG tablet, Patient reports taking 1 tablet every morning., Disp: 180 tablet, Rfl: 1    amLODIPine (NORVASC) 5 MG tablet, Take 1 tablet by mouth daily, Disp: 90 tablet, Rfl: 1    blood glucose test strips (EXACTECH TEST) strip, USE DAILY, Disp: , Rfl:

## 2025-04-18 ENCOUNTER — RESULTS FOLLOW-UP (OUTPATIENT)
Dept: FAMILY MEDICINE CLINIC | Age: 80
End: 2025-04-18

## 2025-05-27 ENCOUNTER — OFFICE VISIT (OUTPATIENT)
Dept: FAMILY MEDICINE CLINIC | Age: 80
End: 2025-05-27

## 2025-05-27 VITALS
BODY MASS INDEX: 31.65 KG/M2 | TEMPERATURE: 98.4 F | HEIGHT: 59 IN | WEIGHT: 157 LBS | SYSTOLIC BLOOD PRESSURE: 130 MMHG | HEART RATE: 90 BPM | DIASTOLIC BLOOD PRESSURE: 70 MMHG | OXYGEN SATURATION: 97 %

## 2025-05-27 DIAGNOSIS — J32.9 SINOBRONCHITIS: Primary | ICD-10-CM

## 2025-05-27 DIAGNOSIS — J40 SINOBRONCHITIS: Primary | ICD-10-CM

## 2025-05-27 RX ORDER — DOXYCYCLINE HYCLATE 100 MG
100 TABLET ORAL 2 TIMES DAILY
Qty: 20 TABLET | Refills: 0 | Status: SHIPPED | OUTPATIENT
Start: 2025-05-27 | End: 2025-06-06

## 2025-05-27 RX ORDER — METHYLPREDNISOLONE 4 MG/1
TABLET ORAL
Qty: 1 KIT | Refills: 0 | Status: SHIPPED | OUTPATIENT
Start: 2025-05-27 | End: 2025-06-02

## 2025-05-27 RX ORDER — DEXTROMETHORPHAN HYDROBROMIDE AND PROMETHAZINE HYDROCHLORIDE 15; 6.25 MG/5ML; MG/5ML
5 SYRUP ORAL EVERY 6 HOURS PRN
Qty: 120 ML | Refills: 0 | Status: SHIPPED | OUTPATIENT
Start: 2025-05-27

## 2025-05-27 NOTE — PROGRESS NOTES
Chief Complaint       Cough (X1 week with sinus pressure and drainage, coughing up a lot of thick mucous/) and Congestion      History of Present Illness   Source of history provided by:  patient.    Camila Ha is a 79 y.o. old female presenting to the walk in clinic for evaluation of sinus pressure, discolored nasal drainage, productive cough, chest congestion, coughing fits, postnasal drainage, and mild sore throat for the past week. Denies any fever, CP, dyspnea, LE edema, abdominal pain, vomiting, rash, or lethargy. Denies any hx of asthma or COPD.    ROS    Unless otherwise stated in this report or unable to obtain because of the patient's clinical or mental status as evidenced by the medical record, this patients's positive and negative responses for Review of Systems, constitutional, psych, eyes, ENT, cardiovascular, respiratory, gastrointestinal, neurological, genitourinary, musculoskeletal, integument systems and systems related to the presenting problem are either stated in the preceding or were not pertinent or were negative for the symptoms and/or complaints related to the medical problem.    Past Medical History:  has a past medical history of CAD (coronary artery disease), Diabetes mellitus (HCC), Essential hypertension, benign, History of colon polyps, Hyperinsulinism, Hyperlipidemia, Hypertension, and Hypothyroidism.  Past Surgical History:  has a past surgical history that includes Cardiac surgery (4/11); Cardiac catheterization (5/2011); Diagnostic Cardiac Cath Lab Procedure; Coronary angioplasty; Colonoscopy; Endoscopy, colon, diagnostic; Appendectomy; Colonoscopy (07/02/2012); Carpal tunnel release (Bilateral); Cardiac catheterization (1-); eye surgery (Right); Percutaneous Transluminal Coronary Angio (2011); Upper gastrointestinal endoscopy (2012); polypectomy (2012); Carpal tunnel release (Left, 11/2013); Hysterectomy; and Hysterectomy, total abdominal.  Social History:

## 2025-06-09 ENCOUNTER — OFFICE VISIT (OUTPATIENT)
Dept: FAMILY MEDICINE CLINIC | Age: 80
End: 2025-06-09
Payer: MEDICARE

## 2025-06-09 VITALS
DIASTOLIC BLOOD PRESSURE: 76 MMHG | SYSTOLIC BLOOD PRESSURE: 130 MMHG | WEIGHT: 157 LBS | TEMPERATURE: 97.4 F | HEART RATE: 83 BPM | BODY MASS INDEX: 31.65 KG/M2 | HEIGHT: 59 IN | OXYGEN SATURATION: 96 %

## 2025-06-09 DIAGNOSIS — R06.2 WHEEZING: ICD-10-CM

## 2025-06-09 DIAGNOSIS — J40 SINOBRONCHITIS: Primary | ICD-10-CM

## 2025-06-09 DIAGNOSIS — J32.9 SINOBRONCHITIS: Primary | ICD-10-CM

## 2025-06-09 PROCEDURE — 1090F PRES/ABSN URINE INCON ASSESS: CPT

## 2025-06-09 PROCEDURE — 96372 THER/PROPH/DIAG INJ SC/IM: CPT

## 2025-06-09 PROCEDURE — 1160F RVW MEDS BY RX/DR IN RCRD: CPT

## 2025-06-09 PROCEDURE — 1036F TOBACCO NON-USER: CPT

## 2025-06-09 PROCEDURE — 99214 OFFICE O/P EST MOD 30 MIN: CPT

## 2025-06-09 PROCEDURE — 3006F CXR DOC REV: CPT

## 2025-06-09 PROCEDURE — 1159F MED LIST DOCD IN RCRD: CPT

## 2025-06-09 PROCEDURE — G8417 CALC BMI ABV UP PARAM F/U: HCPCS

## 2025-06-09 PROCEDURE — 94640 AIRWAY INHALATION TREATMENT: CPT

## 2025-06-09 PROCEDURE — G8427 DOCREV CUR MEDS BY ELIG CLIN: HCPCS

## 2025-06-09 PROCEDURE — G8399 PT W/DXA RESULTS DOCUMENT: HCPCS

## 2025-06-09 PROCEDURE — 3075F SYST BP GE 130 - 139MM HG: CPT

## 2025-06-09 PROCEDURE — 3078F DIAST BP <80 MM HG: CPT

## 2025-06-09 PROCEDURE — 1123F ACP DISCUSS/DSCN MKR DOCD: CPT

## 2025-06-09 RX ORDER — METHYLPREDNISOLONE ACETATE 40 MG/ML
40 INJECTION, SUSPENSION INTRA-ARTICULAR; INTRALESIONAL; INTRAMUSCULAR; SOFT TISSUE ONCE
Status: COMPLETED | OUTPATIENT
Start: 2025-06-09 | End: 2025-06-09

## 2025-06-09 RX ORDER — IPRATROPIUM BROMIDE AND ALBUTEROL SULFATE 2.5; .5 MG/3ML; MG/3ML
1 SOLUTION RESPIRATORY (INHALATION) ONCE
Status: COMPLETED | OUTPATIENT
Start: 2025-06-09 | End: 2025-06-09

## 2025-06-09 RX ORDER — CEFDINIR 300 MG/1
300 CAPSULE ORAL 2 TIMES DAILY
Qty: 20 CAPSULE | Refills: 0 | Status: SHIPPED | OUTPATIENT
Start: 2025-06-09 | End: 2025-06-19

## 2025-06-09 RX ORDER — ALBUTEROL SULFATE 90 UG/1
2 INHALANT RESPIRATORY (INHALATION) 4 TIMES DAILY PRN
Qty: 18 G | Refills: 5 | Status: SHIPPED | OUTPATIENT
Start: 2025-06-09

## 2025-06-09 RX ADMIN — IPRATROPIUM BROMIDE AND ALBUTEROL SULFATE 1 DOSE: 2.5; .5 SOLUTION RESPIRATORY (INHALATION) at 08:44

## 2025-06-09 RX ADMIN — METHYLPREDNISOLONE ACETATE 40 MG: 40 INJECTION, SUSPENSION INTRA-ARTICULAR; INTRALESIONAL; INTRAMUSCULAR; SOFT TISSUE at 08:45

## 2025-06-09 SDOH — ECONOMIC STABILITY: FOOD INSECURITY: WITHIN THE PAST 12 MONTHS, THE FOOD YOU BOUGHT JUST DIDN'T LAST AND YOU DIDN'T HAVE MONEY TO GET MORE.: NEVER TRUE

## 2025-06-09 SDOH — ECONOMIC STABILITY: FOOD INSECURITY: WITHIN THE PAST 12 MONTHS, YOU WORRIED THAT YOUR FOOD WOULD RUN OUT BEFORE YOU GOT MONEY TO BUY MORE.: NEVER TRUE

## 2025-06-09 ASSESSMENT — PATIENT HEALTH QUESTIONNAIRE - PHQ9
2. FEELING DOWN, DEPRESSED OR HOPELESS: NOT AT ALL
SUM OF ALL RESPONSES TO PHQ QUESTIONS 1-9: 0
1. LITTLE INTEREST OR PLEASURE IN DOING THINGS: NOT AT ALL
SUM OF ALL RESPONSES TO PHQ QUESTIONS 1-9: 0

## 2025-06-09 NOTE — PROGRESS NOTES
General: Abdomen is flat.      Palpations: Abdomen is soft.   Skin:     General: Skin is warm and dry.   Neurological:      General: No focal deficit present.      Mental Status: She is alert and oriented to person, place, and time.      GCS: GCS eye subscore is 4. GCS verbal subscore is 5. GCS motor subscore is 6.   Psychiatric:         Mood and Affect: Mood normal.         Behavior: Behavior normal.         Thought Content: Thought content normal.         Judgment: Judgment normal.          Testing:     Results  XR CHEST (2 VW)  Result Date: 6/9/2025  EXAMINATION: TWO XRAY VIEWS OF THE CHEST 6/9/2025 9:04 am COMPARISON: 06/11/2024 HISTORY: ORDERING SYSTEM PROVIDED HISTORY: Wheezing TECHNOLOGIST PROVIDED HISTORY: Reason for exam:->wheezing FINDINGS: There signs of scarring at each lung base.  Blunting of the costophrenic angles are noted but these are unchanged when compared the previous study. The mid and upper lung fields appear clear.  The pulmonary vasculature and heart size are within the normal range.  There is mild hyperinflation of the lungs with flattening the diaphragms, suggests underlying signs of obstructive physiology.     1. There are no signs of an acute cardiopulmonary process. 2. Hyperinflation of the lungs suggests underlying signs of obstructive physiology.         No results found for this visit on 06/09/25.     Assessment/Plan:   I personally reviewed the patient's allergies, past medical history, medications, and vitals sign.      Camila was seen today for cough.    Diagnoses and all orders for this visit:    Sinobronchitis  -     XR CHEST (2 VW)  -     methylPREDNISolone acetate (DEPO-MEDROL) injection 40 mg  -     ipratropium 0.5 mg-albuterol 2.5 mg (DUONEB) nebulizer solution 1 Dose  -     albuterol sulfate HFA (VENTOLIN HFA) 108 (90 Base) MCG/ACT inhaler; Inhale 2 puffs into the lungs 4 times daily as needed for Wheezing  -     cefdinir (OMNICEF) 300 MG capsule; Take 1 capsule by mouth 2

## 2025-06-11 ENCOUNTER — TELEPHONE (OUTPATIENT)
Dept: FAMILY MEDICINE CLINIC | Age: 80
End: 2025-06-11

## 2025-06-11 NOTE — TELEPHONE ENCOUNTER
Both suggestions good. Will probably order nebulizer when she comes in, requires discussion and ov note for DME provider

## 2025-06-11 NOTE — TELEPHONE ENCOUNTER
Patient calling to report she is having a hard time using her new inhaler.   She was seen in [facility] on 6/9/25 for cough. Had CXR   IMPRESSION:  1. There are no signs of an acute cardiopulmonary process.  2. Hyperinflation of the lungs suggests underlying signs of obstructive  physiology.  I did review with patient how to use the inhaler. She states when trying to use it she coughs. She has Mucinex at home she is going to try and see if that helps her at all.   She reports having a nebulizer tx in the office when her and it worked well for her.   I also mentioned a spacer for the inhaler, she was not sure about this.   She states she has an appointment next week with you and will follow up then.   I told her I would send a message to update you.

## 2025-06-16 DIAGNOSIS — R50.9 FEBRILE ILLNESS, ACUTE: Primary | ICD-10-CM

## 2025-06-16 LAB
BASOPHILS ABSOLUTE: 0.05 K/UL (ref 0–0.2)
BASOPHILS RELATIVE PERCENT: 1 % (ref 0–2)
EOSINOPHILS ABSOLUTE: 0.11 K/UL (ref 0.05–0.5)
EOSINOPHILS RELATIVE PERCENT: 2 % (ref 0–6)
HBA1C MFR BLD: 6.7 % (ref 4–5.6)
HCT VFR BLD CALC: 40.7 % (ref 34–48)
HEMOGLOBIN: 13.3 G/DL (ref 11.5–15.5)
IMMATURE GRANULOCYTES %: 0 % (ref 0–5)
IMMATURE GRANULOCYTES ABSOLUTE: 0.03 K/UL (ref 0–0.58)
LYMPHOCYTES ABSOLUTE: 1.53 K/UL (ref 1.5–4)
LYMPHOCYTES RELATIVE PERCENT: 20 % (ref 20–42)
MCH RBC QN AUTO: 31.7 PG (ref 26–35)
MCHC RBC AUTO-ENTMCNC: 32.7 G/DL (ref 32–34.5)
MCV RBC AUTO: 97.1 FL (ref 80–99.9)
MONOCYTES ABSOLUTE: 0.41 K/UL (ref 0.1–0.95)
MONOCYTES RELATIVE PERCENT: 6 % (ref 2–12)
NEUTROPHILS ABSOLUTE: 5.36 K/UL (ref 1.8–7.3)
NEUTROPHILS RELATIVE PERCENT: 72 % (ref 43–80)
PDW BLD-RTO: 13.9 % (ref 11.5–15)
PLATELET, FLUORESCENCE: 203 K/UL (ref 130–450)
PMV BLD AUTO: 13.2 FL (ref 7–12)
RBC # BLD: 4.19 M/UL (ref 3.5–5.5)
WBC # BLD: 7.5 K/UL (ref 4.5–11.5)

## 2025-06-17 ENCOUNTER — RESULTS FOLLOW-UP (OUTPATIENT)
Dept: FAMILY MEDICINE CLINIC | Age: 80
End: 2025-06-17

## 2025-06-17 ENCOUNTER — OFFICE VISIT (OUTPATIENT)
Dept: FAMILY MEDICINE CLINIC | Age: 80
End: 2025-06-17

## 2025-06-17 VITALS
SYSTOLIC BLOOD PRESSURE: 122 MMHG | BODY MASS INDEX: 30.44 KG/M2 | WEIGHT: 151 LBS | TEMPERATURE: 97.1 F | OXYGEN SATURATION: 96 % | HEART RATE: 64 BPM | HEIGHT: 59 IN | RESPIRATION RATE: 16 BRPM | DIASTOLIC BLOOD PRESSURE: 82 MMHG

## 2025-06-17 DIAGNOSIS — J40 BRONCHITIS WITH WHEEZING: Primary | ICD-10-CM

## 2025-06-17 DIAGNOSIS — E78.49 OTHER HYPERLIPIDEMIA: ICD-10-CM

## 2025-06-17 DIAGNOSIS — R50.9 FEBRILE ILLNESS, ACUTE: ICD-10-CM

## 2025-06-17 DIAGNOSIS — E06.3 HYPOTHYROIDISM DUE TO HASHIMOTO'S THYROIDITIS: ICD-10-CM

## 2025-06-17 DIAGNOSIS — Z00.00 ENCOUNTER FOR SUBSEQUENT ANNUAL WELLNESS VISIT (AWV) IN MEDICARE PATIENT: ICD-10-CM

## 2025-06-17 DIAGNOSIS — J40 BRONCHITIS WITH WHEEZING: ICD-10-CM

## 2025-06-17 DIAGNOSIS — I10 ESSENTIAL HYPERTENSION, BENIGN: Chronic | ICD-10-CM

## 2025-06-17 LAB
BILIRUBIN, URINE: NEGATIVE
CHOLESTEROL, TOTAL: 145 MG/DL
COLOR, UA: YELLOW
COMMENT: ABNORMAL
GLUCOSE URINE: NEGATIVE MG/DL
HDLC SERPL-MCNC: 50 MG/DL
KETONES, URINE: NEGATIVE MG/DL
LDL CHOLESTEROL: 75 MG/DL
LEUKOCYTE ESTERASE, URINE: NEGATIVE
Lab: NORMAL
NITRITE, URINE: NEGATIVE
PERFORMING INSTRUMENT: NORMAL
PH, URINE: 6 (ref 5–8)
PROTEIN UA: NEGATIVE MG/DL
QC PASS/FAIL: NORMAL
SARS-COV-2, POC: NORMAL
SPECIFIC GRAVITY UA: <1.005 (ref 1–1.03)
TRIGL SERPL-MCNC: 100 MG/DL
TURBIDITY: CLEAR
URINE HGB: NEGATIVE
UROBILINOGEN, URINE: 0.2 EU/DL (ref 0–1)
VLDLC SERPL CALC-MCNC: 20 MG/DL

## 2025-06-17 RX ORDER — AMLODIPINE BESYLATE 5 MG/1
5 TABLET ORAL DAILY
Qty: 90 TABLET | Refills: 1 | Status: SHIPPED | OUTPATIENT
Start: 2025-06-17

## 2025-06-17 RX ORDER — MONTELUKAST SODIUM 10 MG/1
10 TABLET ORAL DAILY
Qty: 30 TABLET | Refills: 3 | Status: SHIPPED | OUTPATIENT
Start: 2025-06-17

## 2025-06-17 RX ORDER — LEVALBUTEROL TARTRATE 45 UG/1
AEROSOL, METERED ORAL
Qty: 1 EACH | Refills: 3 | Status: SHIPPED | OUTPATIENT
Start: 2025-06-17

## 2025-06-17 RX ORDER — LEVOTHYROXINE SODIUM 75 MCG
75 TABLET ORAL EVERY MORNING
Qty: 90 TABLET | Refills: 1 | Status: SHIPPED | OUTPATIENT
Start: 2025-06-17

## 2025-06-17 RX ORDER — ROSUVASTATIN CALCIUM 10 MG/1
10 TABLET, COATED ORAL NIGHTLY
Qty: 90 TABLET | Refills: 3 | Status: SHIPPED | OUTPATIENT
Start: 2025-06-17

## 2025-06-17 RX ORDER — BENZONATATE 100 MG/1
100 CAPSULE ORAL 3 TIMES DAILY PRN
Qty: 30 CAPSULE | Refills: 0 | Status: SHIPPED | OUTPATIENT
Start: 2025-06-17 | End: 2025-06-27

## 2025-06-17 RX ORDER — OLMESARTAN MEDOXOMIL 20 MG/1
TABLET ORAL
Qty: 180 TABLET | Refills: 1 | Status: SHIPPED | OUTPATIENT
Start: 2025-06-17

## 2025-06-17 ASSESSMENT — ENCOUNTER SYMPTOMS
TROUBLE SWALLOWING: 1
SHORTNESS OF BREATH: 1
ABDOMINAL PAIN: 0
COUGH: 1
SINUS PRESSURE: 1
EYE REDNESS: 0
COLOR CHANGE: 0
WHEEZING: 0
BLOOD IN STOOL: 0
SORE THROAT: 1
PHOTOPHOBIA: 0

## 2025-06-17 NOTE — PROGRESS NOTES
Medicare Annual Wellness Visit    [facility] ([facility] 1 month ), Congestion (X 1 month ), Anorexia (Loss of appetite ), and Gastroesophageal Reflux    Assessment & Plan   Bronchitis with wheezing  -     POCT COVID-19, Antigen  -     benzonatate (TESSALON) 100 MG capsule; Take 1 capsule by mouth 3 times daily as needed for Cough, Disp-30 capsule, R-0Normal  -     Bordetella Pertussis IgM, Immunoblot; Future  Hypothyroidism due to Hashimoto's thyroiditis  -     SYNTHROID 75 MCG tablet; Take 1 tablet by mouth every morning, Disp-90 tablet, R-1, DAWNormal  Other hyperlipidemia  -     rosuvastatin (CRESTOR) 10 MG tablet; Take 1 tablet by mouth nightly, Disp-90 tablet, R-3, DAWNormal  -     Lipid Panel; Future  Essential hypertension, benign  -     olmesartan (BENICAR) 20 MG tablet; Patient reports taking 1 tablet every morning., Disp-180 tablet, R-1Normal  -     amLODIPine (NORVASC) 5 MG tablet; Take 1 tablet by mouth daily, Disp-90 tablet, R-1Normal  Encounter for subsequent annual wellness visit (AWV) in Medicare patient       Return in about 6 months (around 12/17/2025).     Subjective   The following acute and/or chronic problems were also addressed today:  See OV note  Patient's complete Health Risk Assessment and screening values have been reviewed and are found in Flowsheets. The following problems were reviewed today and where indicated follow up appointments were made and/or referrals ordered.    Positive Risk Factor Screenings with Interventions:                Abnormal BMI (obese):  Body mass index is 30.5 kg/m². (!) Abnormal  Interventions:  BMI 30.5, DM, weight down 4 lbs last  4 mos. Discussed avoiding sweetened beverages, concentrated sweets, limit carbs after 12:00 meal                           Objective   Vitals:    06/17/25 0813   BP: 122/82   BP Site: Right Upper Arm   Patient Position: Sitting   BP Cuff Size: Large Adult   Pulse: 64   Resp: 16   Temp: 97.1 °F (36.2 °C)   TempSrc:

## 2025-06-18 LAB
CULTURE: NO GROWTH
SPECIMEN DESCRIPTION: NORMAL

## 2025-06-20 LAB
Lab: NEGATIVE

## 2025-07-30 ENCOUNTER — OFFICE VISIT (OUTPATIENT)
Dept: FAMILY MEDICINE CLINIC | Age: 80
End: 2025-07-30

## 2025-07-30 VITALS
BODY MASS INDEX: 31.45 KG/M2 | OXYGEN SATURATION: 98 % | WEIGHT: 156 LBS | RESPIRATION RATE: 15 BRPM | SYSTOLIC BLOOD PRESSURE: 128 MMHG | HEART RATE: 81 BPM | HEIGHT: 59 IN | TEMPERATURE: 97.8 F | DIASTOLIC BLOOD PRESSURE: 80 MMHG

## 2025-07-30 DIAGNOSIS — B35.4 TINEA CORPORIS: Primary | ICD-10-CM

## 2025-07-30 RX ORDER — TERBINAFINE HYDROCHLORIDE 250 MG/1
250 TABLET ORAL DAILY
Qty: 14 TABLET | Refills: 0 | Status: SHIPPED | OUTPATIENT
Start: 2025-07-30 | End: 2025-08-13

## 2025-07-30 RX ORDER — CLOTRIMAZOLE AND BETAMETHASONE DIPROPIONATE 10; .64 MG/G; MG/G
CREAM TOPICAL
Qty: 30 G | Refills: 0 | Status: SHIPPED | OUTPATIENT
Start: 2025-07-30

## 2025-07-30 RX ORDER — METOPROLOL SUCCINATE 25 MG/1
25 TABLET, EXTENDED RELEASE ORAL NIGHTLY
COMMUNITY
Start: 2025-06-16

## 2025-07-31 NOTE — PROGRESS NOTES
OFFICE NOTE    25  Name: Camila Ha  :1945   Sex:female   Age:79 y.o.      SUBJECTIVE  Chief Complaint   Patient presents with    Rash     On the right foot ( around the heel area)       History of Present Illness  The patient is an 79-year-old female who presents for evaluation of a skin condition.    She reports a non-itchy skin condition that initially presented as a small, dry area. Despite using an entire tube of athlete's foot cream, the condition has not improved and appears to be expanding. She does not have any similar symptoms on her other foot or elsewhere on her body.    Her statin dosage was recently increased by her cardiologist, Dr. Gonsalez, from 10 mg to 20 mg every other day due to her LDL level being at 75, with a target goal of 70. She is scheduled for blood work in 2025.       Review of Systems   Pt does have CAD      Current Outpatient Medications:     metoprolol succinate (TOPROL XL) 25 MG extended release tablet, Take 1 tablet by mouth nightly, Disp: , Rfl:     terbinafine (LAMISIL) 250 MG tablet, Take 1 tablet by mouth daily for 14 days, Disp: 14 tablet, Rfl: 0    clotrimazole-betamethasone (LOTRISONE) 1-0.05 % cream, Apply topically 2 times daily till gone, Disp: 30 g, Rfl: 0    SYNTHROID 75 MCG tablet, Take 1 tablet by mouth every morning, Disp: 90 tablet, Rfl: 1    rosuvastatin (CRESTOR) 10 MG tablet, Take 1 tablet by mouth nightly (Patient taking differently: Take 1 tablet by mouth nightly M, W, F take 20 mg nightly and T, , Sun take 10 mg by mouth at night), Disp: 90 tablet, Rfl: 3    olmesartan (BENICAR) 20 MG tablet, Patient reports taking 1 tablet every morning., Disp: 180 tablet, Rfl: 1    amLODIPine (NORVASC) 5 MG tablet, Take 1 tablet by mouth daily, Disp: 90 tablet, Rfl: 1    montelukast (SINGULAIR) 10 MG tablet, Take 1 tablet by mouth daily, Disp: 30 tablet, Rfl: 3    omeprazole (PRILOSEC) 40 MG delayed release capsule, Take 1 capsule by mouth every

## 2025-08-26 ENCOUNTER — TELEPHONE (OUTPATIENT)
Dept: FAMILY MEDICINE CLINIC | Age: 80
End: 2025-08-26

## 2025-08-27 ENCOUNTER — OFFICE VISIT (OUTPATIENT)
Dept: FAMILY MEDICINE CLINIC | Age: 80
End: 2025-08-27

## 2025-08-27 VITALS
BODY MASS INDEX: 32.05 KG/M2 | OXYGEN SATURATION: 98 % | WEIGHT: 159 LBS | TEMPERATURE: 97.5 F | HEIGHT: 59 IN | DIASTOLIC BLOOD PRESSURE: 74 MMHG | SYSTOLIC BLOOD PRESSURE: 118 MMHG | HEART RATE: 85 BPM | RESPIRATION RATE: 16 BRPM

## 2025-08-27 DIAGNOSIS — S76.012D STRAIN OF GLUTEUS MEDIUS OF LEFT LOWER EXTREMITY, SUBSEQUENT ENCOUNTER: Primary | ICD-10-CM

## 2025-08-27 RX ORDER — MELOXICAM 15 MG/1
15 TABLET ORAL DAILY PRN
COMMUNITY
Start: 2025-08-18

## 2025-09-02 LAB
ALBUMIN SERPL-MCNC: 4.1 G/DL (ref 3.5–5.2)
ALP SERPL-CCNC: 57 U/L (ref 35–104)
ALT SERPL-CCNC: 28 U/L (ref 0–35)
ANION GAP SERPL CALCULATED.3IONS-SCNC: 10 MMOL/L (ref 7–16)
AST SERPL-CCNC: 36 U/L (ref 0–35)
BILIRUB SERPL-MCNC: 0.6 MG/DL (ref 0–1.2)
BUN SERPL-MCNC: 21 MG/DL (ref 8–23)
CALCIUM SERPL-MCNC: 10.3 MG/DL (ref 8.8–10.2)
CHLORIDE SERPL-SCNC: 107 MMOL/L (ref 98–107)
CHOLEST SERPL-MCNC: 130 MG/DL
CO2 SERPL-SCNC: 23 MMOL/L (ref 22–29)
CREAT SERPL-MCNC: 0.9 MG/DL (ref 0.5–1)
CREAT UR-MCNC: 122 MG/DL (ref 29–226)
GFR, ESTIMATED: 64 ML/MIN/1.73M2
GLUCOSE SERPL-MCNC: 134 MG/DL (ref 74–99)
HDLC SERPL-MCNC: 39 MG/DL
LDLC SERPL CALC-MCNC: 64 MG/DL
MICROALBUMIN UR-MCNC: 62 MG/L (ref 0–20)
MICROALBUMIN/CREAT UR-RTO: 51 MCG/MG CREAT (ref 0–30)
POTASSIUM SERPL-SCNC: 4.7 MMOL/L (ref 3.5–5.1)
PROT SERPL-MCNC: 7.3 G/DL (ref 6.4–8.3)
SODIUM SERPL-SCNC: 140 MMOL/L (ref 136–145)
T4 FREE SERPL-MCNC: 0.9 NG/DL (ref 0.9–1.7)
TRIGL SERPL-MCNC: 135 MG/DL
TSH SERPL DL<=0.05 MIU/L-ACNC: 7.03 UIU/ML (ref 0.27–4.2)
VLDLC SERPL CALC-MCNC: 27 MG/DL

## 2025-09-04 LAB
CREAT UR-MCNC: 114 MG/DL (ref 29–226)
MICROALBUMIN UR-MCNC: 50 MG/L (ref 0–20)
MICROALBUMIN/CREAT UR-RTO: 44 MCG/MG CREAT (ref 0–30)